# Patient Record
Sex: FEMALE | Race: WHITE | Employment: OTHER | URBAN - METROPOLITAN AREA
[De-identification: names, ages, dates, MRNs, and addresses within clinical notes are randomized per-mention and may not be internally consistent; named-entity substitution may affect disease eponyms.]

---

## 2018-03-21 ENCOUNTER — HOSPITAL ENCOUNTER (OUTPATIENT)
Age: 68
Discharge: HOME | End: 2018-03-21
Payer: MEDICARE

## 2018-03-21 DIAGNOSIS — I12.9 BENIGN HYPERTENSION WITH CKD (CHRONIC KIDNEY DISEASE) STAGE III (HCC): ICD-10-CM

## 2018-03-21 DIAGNOSIS — E55.9 VITAMIN D DEFICIENCY: ICD-10-CM

## 2018-03-21 DIAGNOSIS — N18.30 CKD (CHRONIC KIDNEY DISEASE) STAGE 3, GFR 30-59 ML/MIN (HCC): ICD-10-CM

## 2018-03-21 DIAGNOSIS — N18.30 BENIGN HYPERTENSION WITH CKD (CHRONIC KIDNEY DISEASE) STAGE III (HCC): ICD-10-CM

## 2018-03-21 LAB
ANION GAP SERPL CALCULATED.3IONS-SCNC: 13 MMOL/L (ref 9–17)
BUN BLDV-MCNC: 27 MG/DL (ref 8–23)
CHLORIDE BLD-SCNC: 101 MMOL/L (ref 98–107)
CO2: 24 MMOL/L (ref 20–31)
CREAT SERPL-MCNC: 1.28 MG/DL (ref 0.5–0.9)
GFR AFRICAN AMERICAN: 50 ML/MIN
GFR NON-AFRICAN AMERICAN: 41 ML/MIN
GFR SERPL CREATININE-BSD FRML MDRD: ABNORMAL ML/MIN/{1.73_M2}
GFR SERPL CREATININE-BSD FRML MDRD: ABNORMAL ML/MIN/{1.73_M2}
MAGNESIUM: 1.6 MG/DL (ref 1.6–2.6)
POTASSIUM SERPL-SCNC: 4.5 MMOL/L (ref 3.7–5.3)
SODIUM BLD-SCNC: 138 MMOL/L (ref 135–144)
VITAMIN D 25-HYDROXY: 26.3 NG/ML (ref 30–100)

## 2018-03-21 PROCEDURE — 84520 ASSAY OF UREA NITROGEN: CPT

## 2018-03-21 PROCEDURE — 36415 COLL VENOUS BLD VENIPUNCTURE: CPT

## 2018-03-21 PROCEDURE — 83735 ASSAY OF MAGNESIUM: CPT

## 2018-03-21 PROCEDURE — 82565 ASSAY OF CREATININE: CPT

## 2018-03-21 PROCEDURE — 82306 VITAMIN D 25 HYDROXY: CPT

## 2018-03-21 PROCEDURE — 80051 ELECTROLYTE PANEL: CPT

## 2018-08-06 ENCOUNTER — HOSPITAL ENCOUNTER (OUTPATIENT)
Age: 68
Setting detail: SPECIMEN
Discharge: HOME | End: 2018-08-06
Payer: MEDICARE

## 2018-08-06 DIAGNOSIS — E55.9 VITAMIN D DEFICIENCY: ICD-10-CM

## 2018-08-06 DIAGNOSIS — N18.30 BENIGN HYPERTENSION WITH CKD (CHRONIC KIDNEY DISEASE) STAGE III (HCC): ICD-10-CM

## 2018-08-06 DIAGNOSIS — I12.9 BENIGN HYPERTENSION WITH CKD (CHRONIC KIDNEY DISEASE) STAGE III (HCC): ICD-10-CM

## 2018-08-06 DIAGNOSIS — N18.30 CKD (CHRONIC KIDNEY DISEASE) STAGE 3, GFR 30-59 ML/MIN (HCC): ICD-10-CM

## 2018-08-06 LAB
ABSOLUTE EOS #: 0.2 K/UL (ref 0–0.44)
ABSOLUTE IMMATURE GRANULOCYTE: <0.03 K/UL (ref 0–0.3)
ABSOLUTE LYMPH #: 2.01 K/UL (ref 1.1–3.7)
ABSOLUTE MONO #: 0.59 K/UL (ref 0.1–1.2)
ANION GAP SERPL CALCULATED.3IONS-SCNC: 17 MMOL/L (ref 9–17)
BASOPHILS # BLD: 0 % (ref 0–2)
BASOPHILS ABSOLUTE: 0.03 K/UL (ref 0–0.2)
BUN BLDV-MCNC: 29 MG/DL (ref 8–23)
BUN/CREAT BLD: ABNORMAL (ref 9–20)
CALCIUM SERPL-MCNC: 9.9 MG/DL (ref 8.6–10.4)
CHLORIDE BLD-SCNC: 101 MMOL/L (ref 98–107)
CO2: 23 MMOL/L (ref 20–31)
CREAT SERPL-MCNC: 1.2 MG/DL (ref 0.5–0.9)
DIFFERENTIAL TYPE: ABNORMAL
EOSINOPHILS RELATIVE PERCENT: 3 % (ref 1–4)
GFR AFRICAN AMERICAN: 54 ML/MIN
GFR NON-AFRICAN AMERICAN: 45 ML/MIN
GFR SERPL CREATININE-BSD FRML MDRD: ABNORMAL ML/MIN/{1.73_M2}
GFR SERPL CREATININE-BSD FRML MDRD: ABNORMAL ML/MIN/{1.73_M2}
GLUCOSE BLD-MCNC: 129 MG/DL (ref 70–99)
HCT VFR BLD CALC: 35.9 % (ref 36.3–47.1)
HEMOGLOBIN: 11.9 G/DL (ref 11.9–15.1)
IMMATURE GRANULOCYTES: 0 %
LYMPHOCYTES # BLD: 27 % (ref 24–43)
MAGNESIUM: 1.8 MG/DL (ref 1.6–2.6)
MCH RBC QN AUTO: 29.9 PG (ref 25.2–33.5)
MCHC RBC AUTO-ENTMCNC: 33.1 G/DL (ref 28.4–34.8)
MCV RBC AUTO: 90.2 FL (ref 82.6–102.9)
MONOCYTES # BLD: 8 % (ref 3–12)
NRBC AUTOMATED: 0 PER 100 WBC
PDW BLD-RTO: 12.5 % (ref 11.8–14.4)
PHOSPHORUS: 5.2 MG/DL (ref 2.6–4.5)
PLATELET # BLD: ABNORMAL K/UL (ref 138–453)
PLATELET ESTIMATE: ABNORMAL
PLATELET, FLUORESCENCE: 191 K/UL (ref 138–453)
PLATELET, IMMATURE FRACTION: 4.9 % (ref 1.1–10.3)
PMV BLD AUTO: ABNORMAL FL (ref 8.1–13.5)
POTASSIUM SERPL-SCNC: 4.6 MMOL/L (ref 3.7–5.3)
RBC # BLD: 3.98 M/UL (ref 3.95–5.11)
RBC # BLD: ABNORMAL 10*6/UL
SEG NEUTROPHILS: 62 % (ref 36–65)
SEGMENTED NEUTROPHILS ABSOLUTE COUNT: 4.67 K/UL (ref 1.5–8.1)
SODIUM BLD-SCNC: 141 MMOL/L (ref 135–144)
VITAMIN D 25-HYDROXY: 39.2 NG/ML (ref 30–100)
WBC # BLD: 7.5 K/UL (ref 3.5–11.3)
WBC # BLD: ABNORMAL 10*3/UL

## 2018-08-08 ENCOUNTER — HOSPITAL ENCOUNTER (OUTPATIENT)
Age: 68
Discharge: HOME | End: 2018-08-08
Payer: MEDICARE

## 2018-08-08 DIAGNOSIS — E55.9 VITAMIN D DEFICIENCY: ICD-10-CM

## 2018-08-08 DIAGNOSIS — E83.39 HYPERPHOSPHATEMIA: ICD-10-CM

## 2018-08-08 DIAGNOSIS — N18.30 CKD (CHRONIC KIDNEY DISEASE) STAGE 3, GFR 30-59 ML/MIN (HCC): ICD-10-CM

## 2018-08-08 DIAGNOSIS — R82.998 LEUKOCYTES IN URINE: ICD-10-CM

## 2018-08-08 DIAGNOSIS — I12.9 BENIGN HYPERTENSION WITH CKD (CHRONIC KIDNEY DISEASE) STAGE III (HCC): ICD-10-CM

## 2018-08-08 DIAGNOSIS — N18.30 BENIGN HYPERTENSION WITH CKD (CHRONIC KIDNEY DISEASE) STAGE III (HCC): ICD-10-CM

## 2018-08-08 LAB
-: ABNORMAL
AMORPHOUS: ABNORMAL
BACTERIA: ABNORMAL
BILIRUBIN URINE: NEGATIVE
CASTS UA: ABNORMAL /LPF
COLOR: YELLOW
COMMENT UA: ABNORMAL
CRYSTALS, UA: ABNORMAL /HPF
EPITHELIAL CELLS UA: ABNORMAL /HPF
GLUCOSE URINE: NEGATIVE
KETONES, URINE: NEGATIVE
LEUKOCYTE ESTERASE, URINE: ABNORMAL
MUCUS: ABNORMAL
NITRITE, URINE: NEGATIVE
OTHER OBSERVATIONS UA: ABNORMAL
PH UA: 5.5 (ref 5–8)
PROTEIN UA: NEGATIVE
RBC UA: ABNORMAL /HPF
RENAL EPITHELIAL, UA: ABNORMAL /HPF
SPECIFIC GRAVITY UA: 1.02 (ref 1–1.03)
TRICHOMONAS: ABNORMAL
TURBIDITY: CLEAR
URINE HGB: NEGATIVE
UROBILINOGEN, URINE: NORMAL
WBC UA: ABNORMAL /HPF
YEAST: ABNORMAL

## 2018-08-08 PROCEDURE — 87086 URINE CULTURE/COLONY COUNT: CPT

## 2018-08-08 PROCEDURE — 81001 URINALYSIS AUTO W/SCOPE: CPT

## 2018-08-09 LAB
CULTURE: NORMAL
Lab: NORMAL
SPECIMEN DESCRIPTION: NORMAL
STATUS: NORMAL

## 2019-03-15 ENCOUNTER — HOSPITAL ENCOUNTER (OUTPATIENT)
Age: 69
Setting detail: SPECIMEN
Discharge: HOME | End: 2019-03-15
Payer: MEDICARE

## 2019-03-15 LAB
ANION GAP SERPL CALCULATED.3IONS-SCNC: 14 MMOL/L (ref 9–17)
BUN BLDV-MCNC: 38 MG/DL (ref 8–23)
BUN/CREAT BLD: ABNORMAL (ref 9–20)
CALCIUM SERPL-MCNC: 9.8 MG/DL (ref 8.6–10.4)
CHLORIDE BLD-SCNC: 104 MMOL/L (ref 98–107)
CO2: 22 MMOL/L (ref 20–31)
CREAT SERPL-MCNC: 1.09 MG/DL (ref 0.5–0.9)
GFR AFRICAN AMERICAN: >60 ML/MIN
GFR NON-AFRICAN AMERICAN: 50 ML/MIN
GFR SERPL CREATININE-BSD FRML MDRD: ABNORMAL ML/MIN/{1.73_M2}
GFR SERPL CREATININE-BSD FRML MDRD: ABNORMAL ML/MIN/{1.73_M2}
GLUCOSE BLD-MCNC: 154 MG/DL (ref 70–99)
POTASSIUM SERPL-SCNC: 4.3 MMOL/L (ref 3.7–5.3)
SODIUM BLD-SCNC: 140 MMOL/L (ref 135–144)

## 2019-06-17 ENCOUNTER — HOSPITAL ENCOUNTER (INPATIENT)
Age: 69
LOS: 4 days | Discharge: HOME | DRG: 918 | End: 2019-06-21
Attending: EMERGENCY MEDICINE | Admitting: FAMILY MEDICINE
Payer: MEDICARE

## 2019-06-17 ENCOUNTER — APPOINTMENT (OUTPATIENT)
Dept: GENERAL RADIOLOGY | Age: 69
DRG: 918 | End: 2019-06-17
Payer: MEDICARE

## 2019-06-17 DIAGNOSIS — R06.00 DYSPNEA, UNSPECIFIED TYPE: Primary | ICD-10-CM

## 2019-06-17 DIAGNOSIS — J45.40 MODERATE PERSISTENT REACTIVE AIRWAY DISEASE WITHOUT COMPLICATION: ICD-10-CM

## 2019-06-17 PROBLEM — E11.9 TYPE 2 DIABETES MELLITUS, WITHOUT LONG-TERM CURRENT USE OF INSULIN (HCC): Status: ACTIVE | Noted: 2019-06-17

## 2019-06-17 PROBLEM — J98.9 REACTIVE AIRWAY DISEASE THAT IS NOT ASTHMA: Status: ACTIVE | Noted: 2019-06-17

## 2019-06-17 LAB
% CKMB: 1.1 % (ref 0–3)
ABSOLUTE EOS #: 0.03 K/UL (ref 0–0.44)
ABSOLUTE IMMATURE GRANULOCYTE: 0.04 K/UL (ref 0–0.3)
ABSOLUTE LYMPH #: 1.91 K/UL (ref 1.1–3.7)
ABSOLUTE MONO #: 1.12 K/UL (ref 0.1–1.2)
ALBUMIN SERPL-MCNC: 4.3 G/DL (ref 3.5–5.2)
ALBUMIN/GLOBULIN RATIO: NORMAL (ref 1–2.5)
ALP BLD-CCNC: 58 U/L (ref 35–104)
ALT SERPL-CCNC: 16 U/L (ref 5–33)
ANION GAP SERPL CALCULATED.3IONS-SCNC: 16 MMOL/L (ref 9–17)
AST SERPL-CCNC: 26 U/L
BASOPHILS # BLD: 0 % (ref 0–2)
BASOPHILS ABSOLUTE: <0.03 K/UL (ref 0–0.2)
BILIRUB SERPL-MCNC: 0.32 MG/DL (ref 0.3–1.2)
BILIRUBIN DIRECT: <0.08 MG/DL
BILIRUBIN, INDIRECT: NORMAL MG/DL (ref 0–1)
BNP INTERPRETATION: ABNORMAL
BUN BLDV-MCNC: 36 MG/DL (ref 8–23)
BUN/CREAT BLD: 26 (ref 9–20)
CALCIUM SERPL-MCNC: 9.1 MG/DL (ref 8.6–10.4)
CHLORIDE BLD-SCNC: 92 MMOL/L (ref 98–107)
CK MB: 3.9 NG/ML
CKMB INTERPRETATION: ABNORMAL
CO2: 20 MMOL/L (ref 20–31)
CREAT SERPL-MCNC: 1.37 MG/DL (ref 0.5–0.9)
DIFFERENTIAL TYPE: ABNORMAL
EKG ATRIAL RATE: 69 BPM
EKG P AXIS: 83 DEGREES
EKG P-R INTERVAL: 212 MS
EKG Q-T INTERVAL: 394 MS
EKG QRS DURATION: 98 MS
EKG QTC CALCULATION (BAZETT): 422 MS
EKG R AXIS: -34 DEGREES
EKG T AXIS: 62 DEGREES
EKG VENTRICULAR RATE: 69 BPM
EOSINOPHILS RELATIVE PERCENT: 0 % (ref 1–4)
FIO2: 21
GFR AFRICAN AMERICAN: 46 ML/MIN
GFR NON-AFRICAN AMERICAN: 38 ML/MIN
GFR SERPL CREATININE-BSD FRML MDRD: ABNORMAL ML/MIN/{1.73_M2}
GFR SERPL CREATININE-BSD FRML MDRD: ABNORMAL ML/MIN/{1.73_M2}
GLOBULIN: NORMAL G/DL (ref 1.5–3.8)
GLUCOSE BLD-MCNC: 129 MG/DL (ref 65–105)
GLUCOSE BLD-MCNC: 133 MG/DL (ref 70–99)
GLUCOSE BLD-MCNC: 301 MG/DL (ref 65–105)
GLUCOSE BLD-MCNC: 486 MG/DL (ref 65–105)
GLUCOSE BLD-MCNC: 511 MG/DL (ref 65–105)
HCT VFR BLD CALC: 34.2 % (ref 36.3–47.1)
HEMOGLOBIN: 12 G/DL (ref 11.9–15.1)
IMMATURE GRANULOCYTES: 0 %
INR BLD: 0.9
LYMPHOCYTES # BLD: 17 % (ref 24–43)
MAGNESIUM: 2 MG/DL (ref 1.6–2.6)
MCH RBC QN AUTO: 30.4 PG (ref 25.2–33.5)
MCHC RBC AUTO-ENTMCNC: 35.1 G/DL (ref 28.4–34.8)
MCV RBC AUTO: 86.6 FL (ref 82.6–102.9)
MONOCYTES # BLD: 10 % (ref 3–12)
MYOGLOBIN: 262 NG/ML (ref 25–58)
NEGATIVE BASE EXCESS, ART: 5 (ref 0–2)
NRBC AUTOMATED: 0 PER 100 WBC
O2 DEVICE/FLOW/%: ABNORMAL
PARTIAL THROMBOPLASTIN TIME: 26.6 SEC (ref 23–31)
PATIENT TEMP: 37
PDW BLD-RTO: 12.2 % (ref 11.8–14.4)
PLATELET # BLD: 273 K/UL (ref 138–453)
PLATELET ESTIMATE: ABNORMAL
PMV BLD AUTO: 12.2 FL (ref 8.1–13.5)
POC HCO3: 18.8 MMOL/L (ref 22–27)
POC O2 SATURATION: 91 %
POC PCO2 TEMP: ABNORMAL MM HG
POC PCO2: 28 MM HG (ref 32–45)
POC PH TEMP: ABNORMAL
POC PH: 7.43 (ref 7.35–7.45)
POC PO2 TEMP: ABNORMAL MM HG
POC PO2: 59 MM HG (ref 75–95)
POSITIVE BASE EXCESS, ART: ABNORMAL (ref 0–2)
POTASSIUM SERPL-SCNC: 4.2 MMOL/L (ref 3.7–5.3)
PRO-BNP: 923 PG/ML
PROCALCITONIN: 0.08 NG/ML
PROTHROMBIN TIME: 9.4 SEC (ref 9.7–11.6)
RBC # BLD: 3.95 M/UL (ref 3.95–5.11)
RBC # BLD: ABNORMAL 10*6/UL
SEG NEUTROPHILS: 73 % (ref 36–65)
SEGMENTED NEUTROPHILS ABSOLUTE COUNT: 7.99 K/UL (ref 1.5–8.1)
SODIUM BLD-SCNC: 128 MMOL/L (ref 135–144)
TCO2 (CALC), ART: 20 MMOL/L (ref 23–28)
TOTAL CK: 356 U/L (ref 26–192)
TOTAL PROTEIN: 7.4 G/DL (ref 6.4–8.3)
TROPONIN INTERP: ABNORMAL
TROPONIN T: ABNORMAL NG/ML
TROPONIN, HIGH SENSITIVITY: 18 NG/L (ref 0–14)
WBC # BLD: 11.4 K/UL (ref 3.5–11.3)
WBC # BLD: ABNORMAL 10*3/UL

## 2019-06-17 PROCEDURE — 97116 GAIT TRAINING THERAPY: CPT

## 2019-06-17 PROCEDURE — 36600 WITHDRAWAL OF ARTERIAL BLOOD: CPT

## 2019-06-17 PROCEDURE — 82803 BLOOD GASES ANY COMBINATION: CPT

## 2019-06-17 PROCEDURE — 83880 ASSAY OF NATRIURETIC PEPTIDE: CPT

## 2019-06-17 PROCEDURE — 97161 PT EVAL LOW COMPLEX 20 MIN: CPT

## 2019-06-17 PROCEDURE — 2580000003 HC RX 258: Performed by: FAMILY MEDICINE

## 2019-06-17 PROCEDURE — 83735 ASSAY OF MAGNESIUM: CPT

## 2019-06-17 PROCEDURE — 94760 N-INVAS EAR/PLS OXIMETRY 1: CPT

## 2019-06-17 PROCEDURE — 94640 AIRWAY INHALATION TREATMENT: CPT

## 2019-06-17 PROCEDURE — 80076 HEPATIC FUNCTION PANEL: CPT

## 2019-06-17 PROCEDURE — 82805 BLOOD GASES W/O2 SATURATION: CPT

## 2019-06-17 PROCEDURE — 85610 PROTHROMBIN TIME: CPT

## 2019-06-17 PROCEDURE — 93010 ELECTROCARDIOGRAM REPORT: CPT | Performed by: INTERNAL MEDICINE

## 2019-06-17 PROCEDURE — 82947 ASSAY GLUCOSE BLOOD QUANT: CPT

## 2019-06-17 PROCEDURE — 94010 BREATHING CAPACITY TEST: CPT

## 2019-06-17 PROCEDURE — 85025 COMPLETE CBC W/AUTO DIFF WBC: CPT

## 2019-06-17 PROCEDURE — 84145 PROCALCITONIN (PCT): CPT

## 2019-06-17 PROCEDURE — 93005 ELECTROCARDIOGRAM TRACING: CPT | Performed by: EMERGENCY MEDICINE

## 2019-06-17 PROCEDURE — 6360000002 HC RX W HCPCS: Performed by: FAMILY MEDICINE

## 2019-06-17 PROCEDURE — 6360000002 HC RX W HCPCS: Performed by: EMERGENCY MEDICINE

## 2019-06-17 PROCEDURE — 82553 CREATINE MB FRACTION: CPT

## 2019-06-17 PROCEDURE — 83874 ASSAY OF MYOGLOBIN: CPT

## 2019-06-17 PROCEDURE — 6370000000 HC RX 637 (ALT 250 FOR IP): Performed by: EMERGENCY MEDICINE

## 2019-06-17 PROCEDURE — 99285 EMERGENCY DEPT VISIT HI MDM: CPT

## 2019-06-17 PROCEDURE — 82550 ASSAY OF CK (CPK): CPT

## 2019-06-17 PROCEDURE — 84484 ASSAY OF TROPONIN QUANT: CPT

## 2019-06-17 PROCEDURE — 1200000000 HC SEMI PRIVATE

## 2019-06-17 PROCEDURE — 85730 THROMBOPLASTIN TIME PARTIAL: CPT

## 2019-06-17 PROCEDURE — 71046 X-RAY EXAM CHEST 2 VIEWS: CPT

## 2019-06-17 PROCEDURE — 80048 BASIC METABOLIC PNL TOTAL CA: CPT

## 2019-06-17 PROCEDURE — 6370000000 HC RX 637 (ALT 250 FOR IP): Performed by: FAMILY MEDICINE

## 2019-06-17 RX ORDER — PRAVASTATIN SODIUM 10 MG
10 TABLET ORAL DAILY
Status: ON HOLD | COMMUNITY
End: 2019-06-21 | Stop reason: HOSPADM

## 2019-06-17 RX ORDER — IPRATROPIUM BROMIDE AND ALBUTEROL SULFATE 2.5; .5 MG/3ML; MG/3ML
1 SOLUTION RESPIRATORY (INHALATION)
Status: COMPLETED | OUTPATIENT
Start: 2019-06-17 | End: 2019-06-18

## 2019-06-17 RX ORDER — ZINC OXIDE 13 %
2 CREAM (GRAM) TOPICAL DAILY
Status: DISCONTINUED | OUTPATIENT
Start: 2019-06-17 | End: 2019-06-17 | Stop reason: CLARIF

## 2019-06-17 RX ORDER — ASPIRIN 81 MG/1
81 TABLET ORAL DAILY
Status: DISCONTINUED | OUTPATIENT
Start: 2019-06-17 | End: 2019-06-21 | Stop reason: HOSPADM

## 2019-06-17 RX ORDER — SULFACETAMIDE SODIUM 100 MG/ML
1 SOLUTION/ DROPS OPHTHALMIC 4 TIMES DAILY
Status: COMPLETED | OUTPATIENT
Start: 2019-06-17 | End: 2019-06-21

## 2019-06-17 RX ORDER — M-VIT,TX,IRON,MINS/CALC/FOLIC 27MG-0.4MG
2 TABLET ORAL DAILY
Status: ON HOLD | COMMUNITY
End: 2019-06-17

## 2019-06-17 RX ORDER — PRAVASTATIN SODIUM 20 MG
10 TABLET ORAL NIGHTLY
Status: DISCONTINUED | OUTPATIENT
Start: 2019-06-17 | End: 2019-06-21 | Stop reason: HOSPADM

## 2019-06-17 RX ORDER — PRAVASTATIN SODIUM 20 MG
20 TABLET ORAL NIGHTLY
Status: DISCONTINUED | OUTPATIENT
Start: 2019-06-17 | End: 2019-06-17

## 2019-06-17 RX ORDER — CALCIUM CARBONATE 500(1250)
500 TABLET ORAL DAILY
Status: DISCONTINUED | OUTPATIENT
Start: 2019-06-17 | End: 2019-06-17 | Stop reason: CLARIF

## 2019-06-17 RX ORDER — METHYLPREDNISOLONE SODIUM SUCCINATE 125 MG/2ML
60 INJECTION, POWDER, LYOPHILIZED, FOR SOLUTION INTRAMUSCULAR; INTRAVENOUS EVERY 6 HOURS
Status: DISCONTINUED | OUTPATIENT
Start: 2019-06-17 | End: 2019-06-17

## 2019-06-17 RX ORDER — NICOTINE POLACRILEX 4 MG
15 LOZENGE BUCCAL PRN
Status: DISCONTINUED | OUTPATIENT
Start: 2019-06-17 | End: 2019-06-21 | Stop reason: HOSPADM

## 2019-06-17 RX ORDER — SODIUM CHLORIDE 0.9 % (FLUSH) 0.9 %
10 SYRINGE (ML) INJECTION EVERY 12 HOURS SCHEDULED
Status: DISCONTINUED | OUTPATIENT
Start: 2019-06-17 | End: 2019-06-21 | Stop reason: HOSPADM

## 2019-06-17 RX ORDER — ALBUTEROL SULFATE 90 UG/1
2 AEROSOL, METERED RESPIRATORY (INHALATION)
Status: DISCONTINUED | OUTPATIENT
Start: 2019-06-17 | End: 2019-06-17

## 2019-06-17 RX ORDER — BENZONATATE 100 MG/1
100 CAPSULE ORAL 3 TIMES DAILY PRN
COMMUNITY
End: 2020-01-24

## 2019-06-17 RX ORDER — AMLODIPINE BESYLATE 10 MG/1
10 TABLET ORAL DAILY
Status: ON HOLD | COMMUNITY
End: 2019-06-21 | Stop reason: HOSPADM

## 2019-06-17 RX ORDER — CALCIUM CARBONATE 200(500)MG
500 TABLET,CHEWABLE ORAL DAILY
Status: DISCONTINUED | OUTPATIENT
Start: 2019-06-17 | End: 2019-06-21 | Stop reason: HOSPADM

## 2019-06-17 RX ORDER — PREDNISONE 20 MG/1
40 TABLET ORAL DAILY
Status: ON HOLD | COMMUNITY
End: 2019-06-21 | Stop reason: HOSPADM

## 2019-06-17 RX ORDER — DEXTROSE MONOHYDRATE 50 MG/ML
100 INJECTION, SOLUTION INTRAVENOUS PRN
Status: DISCONTINUED | OUTPATIENT
Start: 2019-06-17 | End: 2019-06-21 | Stop reason: HOSPADM

## 2019-06-17 RX ORDER — HYDROCHLOROTHIAZIDE 25 MG/1
25 TABLET ORAL DAILY
Status: DISCONTINUED | OUTPATIENT
Start: 2019-06-17 | End: 2019-06-21 | Stop reason: HOSPADM

## 2019-06-17 RX ORDER — GLIMEPIRIDE 2 MG/1
4 TABLET ORAL 2 TIMES DAILY WITH MEALS
Status: DISCONTINUED | OUTPATIENT
Start: 2019-06-17 | End: 2019-06-21 | Stop reason: HOSPADM

## 2019-06-17 RX ORDER — MAGNESIUM GLUCONATE 27 MG(500)
500 TABLET ORAL DAILY
Status: DISCONTINUED | OUTPATIENT
Start: 2019-06-17 | End: 2019-06-17

## 2019-06-17 RX ORDER — IPRATROPIUM BROMIDE AND ALBUTEROL SULFATE 2.5; .5 MG/3ML; MG/3ML
1 SOLUTION RESPIRATORY (INHALATION)
Status: DISCONTINUED | OUTPATIENT
Start: 2019-06-17 | End: 2019-06-17

## 2019-06-17 RX ORDER — METHYLPREDNISOLONE SODIUM SUCCINATE 125 MG/2ML
125 INJECTION, POWDER, LYOPHILIZED, FOR SOLUTION INTRAMUSCULAR; INTRAVENOUS ONCE
Status: COMPLETED | OUTPATIENT
Start: 2019-06-17 | End: 2019-06-17

## 2019-06-17 RX ORDER — METFORMIN HYDROCHLORIDE 500 MG/1
1000 TABLET, EXTENDED RELEASE ORAL
Status: DISCONTINUED | OUTPATIENT
Start: 2019-06-18 | End: 2019-06-19

## 2019-06-17 RX ORDER — INSULIN GLARGINE 100 [IU]/ML
39 INJECTION, SOLUTION SUBCUTANEOUS NIGHTLY
Status: DISCONTINUED | OUTPATIENT
Start: 2019-06-17 | End: 2019-06-21 | Stop reason: HOSPADM

## 2019-06-17 RX ORDER — ALBUTEROL SULFATE 2.5 MG/3ML
5 SOLUTION RESPIRATORY (INHALATION)
Status: DISCONTINUED | OUTPATIENT
Start: 2019-06-17 | End: 2019-06-17

## 2019-06-17 RX ORDER — LOSARTAN POTASSIUM 100 MG/1
100 TABLET ORAL DAILY
Status: DISCONTINUED | OUTPATIENT
Start: 2019-06-17 | End: 2019-06-21 | Stop reason: HOSPADM

## 2019-06-17 RX ORDER — DEXTROSE MONOHYDRATE 25 G/50ML
12.5 INJECTION, SOLUTION INTRAVENOUS PRN
Status: DISCONTINUED | OUTPATIENT
Start: 2019-06-17 | End: 2019-06-21 | Stop reason: HOSPADM

## 2019-06-17 RX ORDER — SODIUM CHLORIDE 0.9 % (FLUSH) 0.9 %
10 SYRINGE (ML) INJECTION PRN
Status: DISCONTINUED | OUTPATIENT
Start: 2019-06-17 | End: 2019-06-21 | Stop reason: HOSPADM

## 2019-06-17 RX ORDER — METFORMIN HYDROCHLORIDE 500 MG/1
1000 TABLET, EXTENDED RELEASE ORAL
Status: ON HOLD | COMMUNITY
End: 2019-06-21 | Stop reason: HOSPADM

## 2019-06-17 RX ORDER — CALCIUM CARBONATE 500(1250)
1200 TABLET ORAL DAILY
Status: ON HOLD | COMMUNITY
End: 2021-07-10 | Stop reason: HOSPADM

## 2019-06-17 RX ORDER — LANOLIN ALCOHOL/MO/W.PET/CERES
1000 CREAM (GRAM) TOPICAL DAILY
COMMUNITY
End: 2019-08-19

## 2019-06-17 RX ORDER — METHYLPREDNISOLONE SODIUM SUCCINATE 125 MG/2ML
60 INJECTION, POWDER, LYOPHILIZED, FOR SOLUTION INTRAMUSCULAR; INTRAVENOUS EVERY 8 HOURS
Status: DISCONTINUED | OUTPATIENT
Start: 2019-06-18 | End: 2019-06-20

## 2019-06-17 RX ADMIN — PRAVASTATIN SODIUM 10 MG: 20 TABLET ORAL at 21:41

## 2019-06-17 RX ADMIN — METOPROLOL TARTRATE 37.5 MG: 25 TABLET ORAL at 21:40

## 2019-06-17 RX ADMIN — METHYLPREDNISOLONE SODIUM SUCCINATE 125 MG: 125 INJECTION, POWDER, FOR SOLUTION INTRAMUSCULAR; INTRAVENOUS at 07:51

## 2019-06-17 RX ADMIN — MAGNESIUM OXIDE TAB 400 MG (241.3 MG ELEMENTAL MG) 400 MG: 400 (241.3 MG) TAB at 17:47

## 2019-06-17 RX ADMIN — IPRATROPIUM BROMIDE AND ALBUTEROL SULFATE 1 AMPULE: .5; 3 SOLUTION RESPIRATORY (INHALATION) at 14:31

## 2019-06-17 RX ADMIN — PROBIOTIC PRODUCT - TAB 2 TABLET: TAB at 17:47

## 2019-06-17 RX ADMIN — METHYLPREDNISOLONE SODIUM SUCCINATE 60 MG: 125 INJECTION, POWDER, FOR SOLUTION INTRAMUSCULAR; INTRAVENOUS at 12:23

## 2019-06-17 RX ADMIN — METHYLPREDNISOLONE SODIUM SUCCINATE 60 MG: 125 INJECTION, POWDER, FOR SOLUTION INTRAMUSCULAR; INTRAVENOUS at 21:41

## 2019-06-17 RX ADMIN — AZITHROMYCIN MONOHYDRATE 500 MG: 500 INJECTION, POWDER, LYOPHILIZED, FOR SOLUTION INTRAVENOUS at 11:58

## 2019-06-17 RX ADMIN — Medication 10 ML: at 21:50

## 2019-06-17 RX ADMIN — ALBUTEROL SULFATE 5 MG: 5 SOLUTION RESPIRATORY (INHALATION) at 06:37

## 2019-06-17 RX ADMIN — SULFACETAMIDE SODIUM 1 DROP: 100 SOLUTION/ DROPS OPHTHALMIC at 13:54

## 2019-06-17 RX ADMIN — SULFACETAMIDE SODIUM 1 DROP: 100 SOLUTION/ DROPS OPHTHALMIC at 17:45

## 2019-06-17 RX ADMIN — ANTACID TABLETS 500 MG: 500 TABLET, CHEWABLE ORAL at 17:47

## 2019-06-17 RX ADMIN — ASPIRIN 81 MG: 81 TABLET, COATED ORAL at 17:47

## 2019-06-17 RX ADMIN — IPRATROPIUM BROMIDE AND ALBUTEROL SULFATE 1 AMPULE: .5; 3 SOLUTION RESPIRATORY (INHALATION) at 19:49

## 2019-06-17 RX ADMIN — SULFACETAMIDE SODIUM 1 DROP: 100 SOLUTION/ DROPS OPHTHALMIC at 21:49

## 2019-06-17 RX ADMIN — HYDROCHLOROTHIAZIDE 25 MG: 25 TABLET ORAL at 17:47

## 2019-06-17 RX ADMIN — INSULIN GLARGINE 39 UNITS: 100 INJECTION, SOLUTION SUBCUTANEOUS at 21:39

## 2019-06-17 RX ADMIN — CHOLECALCIFEROL CAP 125 MCG (5000 UNIT) 5000 UNITS: 125 CAP at 17:48

## 2019-06-17 RX ADMIN — IPRATROPIUM BROMIDE AND ALBUTEROL SULFATE 1 AMPULE: .5; 3 SOLUTION RESPIRATORY (INHALATION) at 11:20

## 2019-06-17 RX ADMIN — INSULIN LISPRO 12 UNITS: 100 INJECTION, SOLUTION INTRAVENOUS; SUBCUTANEOUS at 16:23

## 2019-06-17 RX ADMIN — Medication 10 ML: at 11:58

## 2019-06-17 RX ADMIN — LOSARTAN POTASSIUM 100 MG: 100 TABLET, FILM COATED ORAL at 17:47

## 2019-06-17 RX ADMIN — GLIMEPIRIDE 4 MG: 2 TABLET ORAL at 17:47

## 2019-06-17 RX ADMIN — INSULIN LISPRO 6 UNITS: 100 INJECTION, SOLUTION INTRAVENOUS; SUBCUTANEOUS at 21:38

## 2019-06-17 ASSESSMENT — ENCOUNTER SYMPTOMS
SHORTNESS OF BREATH: 1
EYES NEGATIVE: 1
COUGH: 1
ALLERGIC/IMMUNOLOGIC NEGATIVE: 1
GASTROINTESTINAL NEGATIVE: 1
WHEEZING: 1

## 2019-06-17 ASSESSMENT — PAIN SCALES - GENERAL: PAINLEVEL_OUTOF10: 0

## 2019-06-17 NOTE — ED NOTES
Telephone report given to [facility], [address] [place] [address] Dr. Alex, [facility]  06/17/19 5737

## 2019-06-17 NOTE — PROGRESS NOTES
Occupational Therapy  DATE: 2019    NAME: Mai Sanchez  MRN: [id number]   : 1950    Patient not seen this date for Occupational Therapy due to:  [] Blood transfusion in progress  [] Cancel by RN  [] Hemodialysis  []  Refusal by Patient   [] Spine Precautions   [] Strict Bedrest  [] Surgery  [] Testing      [x] Other- pt is I/no needs and requested OT order DC         [] PT being discontinued at this time. Patient independent. No further needs. [] PT being discontinued at this time as the patient has been transferred to hospice care. No further needs.     Governor Chao OT

## 2019-06-17 NOTE — ED NOTES
Pt presents with c/o SOB. Pt states her symptoms began on Wednesday. States she was prescribed Tessalon Perles and Prednisone on Friday, but has not been experiencing relief. History of asthma. Wheezes noted throughout. Respirations even and non labored. Denies pain.       Jose Monaco RN  06/17/19 6678

## 2019-06-17 NOTE — PROGRESS NOTES
Nutrition Assessment    Type and Reason for Visit: Initial, Consult(Evaluation & Tx)    Nutrition Recommendations: 1. Suggest continuing on general diet. 2. Monitor need for a 1,800 kcal carb controlled restriction & Glucerna Shake. Nutrition Assessment: Per Pt:  +SOB (better), +sinus drainage, +coughing, +decreased taste sensation, and +poor appetite. Slight +wt loss of 1.6 kg (1.6%) since 1/7 (100 kg). Evaluated Pt to be @ risk for malnutrition, with dx of dyspnea & moderate persistent reactive airway ds. Suggest continuing on general diet per Pt request.  Monitor need for a 1,800 kcal carb controlled restriction, and ONS. Malnutrition Assessment:  · Malnutrition Status: At risk for malnutrition  · Findings of the 6 clinical characteristics of malnutrition (Minimum of 2 out of 6 clinical characteristics is required to make the diagnosis of moderate or severe Protein Calorie Malnutrition based on AND/ASPEN Guidelines):  1. Energy Intake-Unable to assess, Unable to assess    2. Weight Loss-1-2% loss, in 6 months  3. Fat Loss-No significant subcutaneous fat loss  4. Muscle Loss-No significant muscle mass loss  5. Fluid Accumulation-No significant fluid accumulation  6.  Strength-Not measured    Nutrition Risk Level: Moderate    Nutrient Needs:  · Estimated Daily Total Kcal: 1,750-1,900 kcal (18-19 kcal/kg)  · Estimated Daily Protein (g): 55-65 g (1-1.2 g/kg)  · Estimated Daily Total Fluid (ml/day): 2,000-2,100 (20-21 ml/kg)    Nutrition Diagnosis:   · Problem: Inadequate oral intake  · Etiology: related to Impaired respiratory function-inability to consume food     Signs and symptoms:  as evidenced by Patient report of:  +SOB  (6/17) XR Chest:  results.     Objective Information:  · Nutrition-Focused Physical Findings: GI:  rounded, soft, nontender, active bowel sounds; PV & Skin:  WDL  · Wound Type: None  · Current Nutrition Therapies:  · Oral Diet Orders: General   · Oral Diet intake:

## 2019-06-17 NOTE — H&P
monitor patient's labs. 3.  We will continue monitoring her cardiac wise  4. We will go ahead and get respiratory pathogen panel as well as procalcitonin level.   Current orders:  Orders Placed This Encounter   Procedures    Respiratory Virus PCR Panel     Standing Status:   Standing     Number of Occurrences:   1    XR CHEST STANDARD (2 VW)     Standing Status:   Standing     Number of Occurrences:   1     Order Specific Question:   Reason for exam:     Answer:   sob    APTT     Standing Status:   Standing     Number of Occurrences:   1    Liver Profile     Standing Status:   Standing     Number of Occurrences:   1    Magnesium     Standing Status:   Standing     Number of Occurrences:   1    PT     Standing Status:   Standing     Number of Occurrences:   1    Basic Metabolic Prof     Standing Status:   Standing     Number of Occurrences:   1    Brain Natriuretic Peptide     Standing Status:   Standing     Number of Occurrences:   1    CBC with DIFF     Standing Status:   Standing     Number of Occurrences:   1    Creat Kinase-MB Iso     Standing Status:   Standing     Number of Occurrences:   1    Trop/Myoglobin     Standing Status:   Standing     Number of Occurrences:   1    Procalcitonin     Standing Status:   Standing     Number of Occurrences:   1    DIET GENERAL;     Standing Status:   Standing     Number of Occurrences:   1    Telemetry monitoring     Standing Status:   Standing     Number of Occurrences:   1     Order Specific Question:   Patient may go off unit without monitor     Answer:   No    Vital signs per unit routine     Standing Status:   Standing     Number of Occurrences:   1    Notify physician     Notify physician for pulse less than 50 or greater than 120, respiratory rate less than 12 or greater than 25, oral temperature greater than 101.3 F (38.5 C) , urinary output less than 120 mL in four hours, systolic BP less than 90 or greater than 947, diastolic BP less than 50 or greater than 100. Standing Status:   Standing     Number of Occurrences:   1    Notify physician     Upon patient's arrival to the unit, please contact the admitting provider for completion of medication reconciliation and comprehensive orders. Standing Status:   Standing     Number of Occurrences:   1    Up as tolerated     Standing Status:   Standing     Number of Occurrences:   12840    Place intermittent pneumatic compression device     When appropriate prophylactic therapy cannot be provided due to patient limitations, serial screening Duplex Doppler Ultrasound studies of the legs may be considered to assess for development of DVT. Standing Status:   Standing     Number of Occurrences:   1    Telemetry monitoring     Standing Status:   Standing     Number of Occurrences:   1     Order Specific Question:   Patient may go off unit without monitor     Answer:   No    Full Code     Standing Status:   Standing     Number of Occurrences:   1    Inpatient consult to Hospitalist     Standing Status:   Standing     Number of Occurrences:   1     Order Specific Question:   Reason for Consult? Answer:   admit    Initiate ED Aerosol Protocol     Level 4  Dyspnea at rest, RR greater than 35. Poor aeration/diffuse wheezing and unable to do peak flow or PEFR below 50% predicted/personal best; unable to do FEV1/FVC or less than 50%. \" Albuterol aerosol 10mg STAT (inhalation)  \" Ipratropium aerosol 0.5mg (inhalation) if patient has COPD    Level 3   Speaking in partial sentences, RR greater than 25. Moderate I/E wheezing with   Adequate aeration and unable to do peak flow or PEFR below 51 to 70 % of predicted/personal best; unable to do FEV1/FVC or is 50 to 59%  \" Albuterol aerosol 5mg (inhalation)  \" Ipratropium aerosol 0.5mg (inhalation) if patient has COPD    Level 2   SOB with minimal activity. RR between 20 and 25. Mild wheezing with good   aeration.  and unable to do peak flow or PEFR 70% or

## 2019-06-17 NOTE — FLOWSHEET NOTE
Patient is sleeping as writer entered the room. Writer gave a silent prayer of healing and comfort during her stay. Spiritual care will follow up as needed or requested.       06/17/19 7818   Encounter Summary   Services provided to: Patient   Referral/Consult From: Alicia   Continue Visiting   (6/17/2019)   Complexity of Encounter Low   Length of Encounter 15 minutes   Routine   Type Initial   Assessment Sleeping   Intervention Prayer

## 2019-06-17 NOTE — ED PROVIDER NOTES
[address] [place] [address] ED  eMERGENCY dEPARTMENT eNCOUnter      Pt Name: Liliya Puente  MRN: [id number]  [place] 1950  Date of evaluation: 6/17/2019  Provider: Munira Garcia MD    CHIEF COMPLAINT     Chief Complaint   Patient presents with    Shortness of Breath         HISTORY OF PRESENT ILLNESS   (Location/Symptom, Timing/Onset, Context/Setting,Quality, Duration, Modifying Factors, Severity)  Note limiting factors. Liliya Puente is a 78 y.o. female who presents to the emergency department with chief complaint of shortness of breath. Patient states she sprayed some deer and rabbit repellent in her yard 1 week ago and the following day started noticing some congestion in her chest.  Following that she has had increased shortness of breath and has been coughing up a lot of phlegm. She denies fever or chills. She presented to an urgent care center 2 days ago and was started on prednisone and Tessalon. The history is provided by the patient and medical records. Nursing Notes werereviewed. REVIEW OF SYSTEMS    (2-9 systems for level 4, 10 or more for level 5)     Review of Systems   All other systems reviewed and are negative. Except as noted above the remainder of the review of systems was reviewed and negative.        PAST MEDICAL HISTORY     Past Medical History:   Diagnosis Date    Anemia     Arrhythmia     Arthritis     Asthma     CKD (chronic kidney disease) stage 1, GFR 90 ml/min or greater     DM type 2 [facility]diabetes mellitus, type 2) [facility] [address]     HTN (hypertension)     Hypomagnesemia          SURGICALHISTORY       Past Surgical History:   Procedure Laterality Date    BUNIONECTOMY      CARPAL TUNNEL RELEASE Left 03/2018    CARPAL TUNNEL RELEASE Right 10/2018    CHOLECYSTECTOMY      FINGER TRIGGER RELEASE Right     ring finger    HERNIA REPAIR      HYSTERECTOMY      TONSILLECTOMY AND ADENOIDECTOMY           CURRENT MEDICATIONS       Previous Medications    ASPIRIN 81 MG TABLET    Take 81 First-degree AV block. Left axis deviation. No acute ST elevation. PACs. RADIOLOGY:   Non-plain film images such as CT, Ultrasound and MRI are read by the radiologist. Plain radiographic images are visualized and preliminarily interpreted by the emergency physician with the below findings:    Interpretation per the Radiologist below, ifavailable at the time of this note:    XR CHEST STANDARD (2 VW)    (Results Pending)         ED BEDSIDE ULTRASOUND:   Performed by ED Physician - none    LABS:  Labs Reviewed   PROTIME-INR - Abnormal; Notable for the following components:       Result Value    Protime 9.4 (*)     All other components within normal limits   CBC WITH AUTO DIFFERENTIAL - Abnormal; Notable for the following components:    WBC 11.4 (*)     Hematocrit 34.2 (*)     MCHC 35.1 (*)     Seg Neutrophils 73 (*)     Lymphocytes 17 (*)     Eosinophils % 0 (*)     All other components within normal limits   APTT   HEPATIC FUNCTION PANEL   MAGNESIUM   BASIC METABOLIC PANEL   BRAIN NATRIURETIC PEPTIDE   CK ISOENZYMES   TROP/MYOGLOBIN       All other labs were within normal range ornot returned as of this dictation. EMERGENCY DEPARTMENT COURSE and DIFFERENTIAL DIAGNOSIS/MDM:   Vitals:    Vitals:    06/17/19 0542 06/17/19 0543   BP:  (!) 144/50   Pulse: 69    Resp: 16    Temp: 98.6 °F (37 °C)    TempSrc: Oral    SpO2: 95%    Weight: 217 lb (98.4 kg)    Height: 5' 1\" (1.549 m)        Work-up was initiated and patient was started on inhaled bronchodilators. Care is transferred to Dr. Erica Mackay at change of shift for further management. MDM    CONSULTS:  None    PROCEDURES:  Unlessotherwise noted below, none     Procedures    FINAL IMPRESSION      1. Dyspnea, unspecified type          DISPOSITION/PLAN   DISPOSITION        PATIENT REFERRED TO:  No follow-up provider specified.     DISCHARGE MEDICATIONS:         Problem List:  Patient Active Problem List   Diagnosis Code    HTN (hypertension) I10    Hypomagnesemia E80.43    CKD (chronic kidney disease) stage 3, GFR 30-59 ml/min ([facility]) N18.3    Vitamin D deficiency E55.9           Summation        ED Medicationsadministered this visit:    Medications   albuterol (PROVENTIL) nebulizer solution 5 mg (5 mg Nebulization Given 6/17/19 0637)   ipratropium-albuterol (DUONEB) nebulizer solution 1 ampule (has no administration in time range)   albuterol (PROVENTIL) nebulizer solution 5 mg (has no administration in time range)   albuterol sulfate  (90 Base) MCG/ACT inhaler 2 puff (has no administration in time range)   albuterol sulfate  (90 Base) MCG/ACT inhaler 2 puff (has no administration in time range)     And   ipratropium (ATROVENT HFA) 17 MCG/ACT inhaler 2 puff (has no administration in time range)   ipratropium (ATROVENT) 0.02 % nebulizer solution 0.5 mg (has no administration in time range)       New Prescriptions from this visit:    New Prescriptions    No medications on file       Follow-up:  No follow-up provider specified. Final Impression:   1.  Dyspnea, unspecified type               (Please note that portions of this note were completed with a voice recognitionprogram.  Efforts were made to edit the dictations but occasionally words are mis-transcribed.)    Jamar Singh MD (electronically signed)  Attending Emergency Physician            Jamar Singh MD  06/17/19 0836

## 2019-06-18 ENCOUNTER — APPOINTMENT (OUTPATIENT)
Dept: GENERAL RADIOLOGY | Age: 69
DRG: 918 | End: 2019-06-18
Payer: MEDICARE

## 2019-06-18 LAB
ABSOLUTE EOS #: <0.03 K/UL (ref 0–0.44)
ABSOLUTE IMMATURE GRANULOCYTE: 0.25 K/UL (ref 0–0.3)
ABSOLUTE LYMPH #: 0.9 K/UL (ref 1.1–3.7)
ABSOLUTE MONO #: 0.41 K/UL (ref 0.1–1.2)
ADENOVIRUS PCR: NOT DETECTED
ANION GAP SERPL CALCULATED.3IONS-SCNC: 14 MMOL/L (ref 9–17)
BASOPHILS # BLD: 0 % (ref 0–2)
BASOPHILS ABSOLUTE: 0.03 K/UL (ref 0–0.2)
BNP INTERPRETATION: ABNORMAL
BORDETELLA PERTUSSIS PCR: NOT DETECTED
BUN BLDV-MCNC: 44 MG/DL (ref 8–23)
BUN/CREAT BLD: 31 (ref 9–20)
CALCIUM SERPL-MCNC: 8.8 MG/DL (ref 8.6–10.4)
CHLAMYDIA PNEUMONIAE BY PCR: NOT DETECTED
CHLORIDE BLD-SCNC: 96 MMOL/L (ref 98–107)
CO2: 18 MMOL/L (ref 20–31)
CORONAVIRUS 229E PCR: NOT DETECTED
CORONAVIRUS HKU1 PCR: NOT DETECTED
CORONAVIRUS NL63 PCR: NOT DETECTED
CORONAVIRUS OC43 PCR: NOT DETECTED
CREAT SERPL-MCNC: 1.44 MG/DL (ref 0.5–0.9)
DIFFERENTIAL TYPE: ABNORMAL
EOSINOPHILS RELATIVE PERCENT: 0 % (ref 1–4)
GFR AFRICAN AMERICAN: 44 ML/MIN
GFR NON-AFRICAN AMERICAN: 36 ML/MIN
GFR SERPL CREATININE-BSD FRML MDRD: ABNORMAL ML/MIN/{1.73_M2}
GFR SERPL CREATININE-BSD FRML MDRD: ABNORMAL ML/MIN/{1.73_M2}
GLUCOSE BLD-MCNC: 311 MG/DL (ref 65–105)
GLUCOSE BLD-MCNC: 379 MG/DL (ref 65–105)
GLUCOSE BLD-MCNC: 391 MG/DL (ref 65–105)
GLUCOSE BLD-MCNC: 397 MG/DL (ref 65–105)
GLUCOSE BLD-MCNC: 408 MG/DL (ref 70–99)
GLUCOSE BLD-MCNC: 494 MG/DL (ref 65–105)
GLUCOSE BLD-MCNC: 571 MG/DL (ref 65–105)
GLUCOSE BLD-MCNC: 582 MG/DL (ref 65–105)
GLUCOSE BLD-MCNC: >600 MG/DL (ref 65–105)
HCT VFR BLD CALC: 33.2 % (ref 36.3–47.1)
HEMOGLOBIN: 11.3 G/DL (ref 11.9–15.1)
HUMAN METAPNEUMOVIRUS PCR: NOT DETECTED
IMMATURE GRANULOCYTES: 2 %
INFLUENZA A BY PCR: NOT DETECTED
INFLUENZA A H1 (2009) PCR: ABNORMAL
INFLUENZA A H1 PCR: ABNORMAL
INFLUENZA A H3 PCR: ABNORMAL
INFLUENZA B BY PCR: NOT DETECTED
LYMPHOCYTES # BLD: 7 % (ref 24–43)
MAGNESIUM: 2.4 MG/DL (ref 1.6–2.6)
MCH RBC QN AUTO: 29.9 PG (ref 25.2–33.5)
MCHC RBC AUTO-ENTMCNC: 34 G/DL (ref 28.4–34.8)
MCV RBC AUTO: 87.8 FL (ref 82.6–102.9)
MONOCYTES # BLD: 3 % (ref 3–12)
MYCOPLASMA PNEUMONIAE PCR: NOT DETECTED
NRBC AUTOMATED: 0 PER 100 WBC
PARAINFLUENZA 1 PCR: NOT DETECTED
PARAINFLUENZA 2 PCR: NOT DETECTED
PARAINFLUENZA 3 PCR: NOT DETECTED
PARAINFLUENZA 4 PCR: NOT DETECTED
PDW BLD-RTO: 12.1 % (ref 11.8–14.4)
PLATELET # BLD: 234 K/UL (ref 138–453)
PLATELET ESTIMATE: ABNORMAL
PMV BLD AUTO: 11.6 FL (ref 8.1–13.5)
POTASSIUM SERPL-SCNC: 4.4 MMOL/L (ref 3.7–5.3)
PRO-BNP: 1954 PG/ML
RBC # BLD: 3.78 M/UL (ref 3.95–5.11)
RBC # BLD: ABNORMAL 10*6/UL
RESP SYNCYTIAL VIRUS PCR: NOT DETECTED
RHINO/ENTEROVIRUS PCR: DETECTED
SEG NEUTROPHILS: 87 % (ref 36–65)
SEGMENTED NEUTROPHILS ABSOLUTE COUNT: 10.79 K/UL (ref 1.5–8.1)
SODIUM BLD-SCNC: 128 MMOL/L (ref 135–144)
SPECIMEN DESCRIPTION: ABNORMAL
WBC # BLD: 12.4 K/UL (ref 3.5–11.3)
WBC # BLD: ABNORMAL 10*3/UL

## 2019-06-18 PROCEDURE — 71046 X-RAY EXAM CHEST 2 VIEWS: CPT

## 2019-06-18 PROCEDURE — 87633 RESP VIRUS 12-25 TARGETS: CPT

## 2019-06-18 PROCEDURE — 1200000000 HC SEMI PRIVATE

## 2019-06-18 PROCEDURE — 94640 AIRWAY INHALATION TREATMENT: CPT

## 2019-06-18 PROCEDURE — 6370000000 HC RX 637 (ALT 250 FOR IP): Performed by: FAMILY MEDICINE

## 2019-06-18 PROCEDURE — 6360000002 HC RX W HCPCS: Performed by: FAMILY MEDICINE

## 2019-06-18 PROCEDURE — 85025 COMPLETE CBC W/AUTO DIFF WBC: CPT

## 2019-06-18 PROCEDURE — 83880 ASSAY OF NATRIURETIC PEPTIDE: CPT

## 2019-06-18 PROCEDURE — 87798 DETECT AGENT NOS DNA AMP: CPT

## 2019-06-18 PROCEDURE — 2580000003 HC RX 258: Performed by: FAMILY MEDICINE

## 2019-06-18 PROCEDURE — 83735 ASSAY OF MAGNESIUM: CPT

## 2019-06-18 PROCEDURE — 36415 COLL VENOUS BLD VENIPUNCTURE: CPT

## 2019-06-18 PROCEDURE — 94760 N-INVAS EAR/PLS OXIMETRY 1: CPT

## 2019-06-18 PROCEDURE — 87486 CHLMYD PNEUM DNA AMP PROBE: CPT

## 2019-06-18 PROCEDURE — 87581 M.PNEUMON DNA AMP PROBE: CPT

## 2019-06-18 PROCEDURE — 80048 BASIC METABOLIC PNL TOTAL CA: CPT

## 2019-06-18 RX ORDER — BUDESONIDE 0.5 MG/2ML
0.5 INHALANT ORAL 2 TIMES DAILY
Status: DISCONTINUED | OUTPATIENT
Start: 2019-06-18 | End: 2019-06-21 | Stop reason: HOSPADM

## 2019-06-18 RX ADMIN — Medication 10 ML: at 08:08

## 2019-06-18 RX ADMIN — INSULIN LISPRO 15 UNITS: 100 INJECTION, SOLUTION INTRAVENOUS; SUBCUTANEOUS at 17:39

## 2019-06-18 RX ADMIN — INSULIN LISPRO 18 UNITS: 100 INJECTION, SOLUTION INTRAVENOUS; SUBCUTANEOUS at 11:58

## 2019-06-18 RX ADMIN — METHYLPREDNISOLONE SODIUM SUCCINATE 60 MG: 125 INJECTION, POWDER, FOR SOLUTION INTRAMUSCULAR; INTRAVENOUS at 11:58

## 2019-06-18 RX ADMIN — IPRATROPIUM BROMIDE AND ALBUTEROL SULFATE 1 AMPULE: .5; 3 SOLUTION RESPIRATORY (INHALATION) at 07:45

## 2019-06-18 RX ADMIN — Medication 10 ML: at 22:00

## 2019-06-18 RX ADMIN — PRAVASTATIN SODIUM 10 MG: 20 TABLET ORAL at 22:00

## 2019-06-18 RX ADMIN — GLIMEPIRIDE 4 MG: 2 TABLET ORAL at 17:39

## 2019-06-18 RX ADMIN — PROBIOTIC PRODUCT - TAB 2 TABLET: TAB at 08:12

## 2019-06-18 RX ADMIN — CHOLECALCIFEROL CAP 125 MCG (5000 UNIT) 5000 UNITS: 125 CAP at 08:13

## 2019-06-18 RX ADMIN — MAGNESIUM OXIDE TAB 400 MG (241.3 MG ELEMENTAL MG) 400 MG: 400 (241.3 MG) TAB at 08:13

## 2019-06-18 RX ADMIN — METHYLPREDNISOLONE SODIUM SUCCINATE 60 MG: 125 INJECTION, POWDER, FOR SOLUTION INTRAMUSCULAR; INTRAVENOUS at 22:00

## 2019-06-18 RX ADMIN — METHYLPREDNISOLONE SODIUM SUCCINATE 60 MG: 125 INJECTION, POWDER, FOR SOLUTION INTRAMUSCULAR; INTRAVENOUS at 08:08

## 2019-06-18 RX ADMIN — METOPROLOL TARTRATE 37.5 MG: 25 TABLET ORAL at 22:00

## 2019-06-18 RX ADMIN — HYDROCHLOROTHIAZIDE 25 MG: 25 TABLET ORAL at 08:13

## 2019-06-18 RX ADMIN — BUDESONIDE 500 MCG: 0.5 SUSPENSION RESPIRATORY (INHALATION) at 11:29

## 2019-06-18 RX ADMIN — LOSARTAN POTASSIUM 100 MG: 100 TABLET, FILM COATED ORAL at 08:13

## 2019-06-18 RX ADMIN — SULFACETAMIDE SODIUM 1 DROP: 100 SOLUTION/ DROPS OPHTHALMIC at 12:09

## 2019-06-18 RX ADMIN — BUDESONIDE 500 MCG: 0.5 SUSPENSION RESPIRATORY (INHALATION) at 20:18

## 2019-06-18 RX ADMIN — IPRATROPIUM BROMIDE AND ALBUTEROL SULFATE 1 AMPULE: .5; 3 SOLUTION RESPIRATORY (INHALATION) at 11:28

## 2019-06-18 RX ADMIN — METOPROLOL TARTRATE 37.5 MG: 25 TABLET ORAL at 08:12

## 2019-06-18 RX ADMIN — METFORMIN HYDROCHLORIDE 1000 MG: 500 TABLET, EXTENDED RELEASE ORAL at 08:13

## 2019-06-18 RX ADMIN — INSULIN LISPRO 6 UNITS: 100 INJECTION, SOLUTION INTRAVENOUS; SUBCUTANEOUS at 22:09

## 2019-06-18 RX ADMIN — SULFACETAMIDE SODIUM 1 DROP: 100 SOLUTION/ DROPS OPHTHALMIC at 17:39

## 2019-06-18 RX ADMIN — GLIMEPIRIDE 4 MG: 2 TABLET ORAL at 08:13

## 2019-06-18 RX ADMIN — INSULIN GLARGINE 39 UNITS: 100 INJECTION, SOLUTION SUBCUTANEOUS at 22:08

## 2019-06-18 RX ADMIN — ANTACID TABLETS 500 MG: 500 TABLET, CHEWABLE ORAL at 08:13

## 2019-06-18 RX ADMIN — ASPIRIN 81 MG: 81 TABLET, COATED ORAL at 08:12

## 2019-06-18 RX ADMIN — AZITHROMYCIN MONOHYDRATE 500 MG: 500 INJECTION, POWDER, LYOPHILIZED, FOR SOLUTION INTRAVENOUS at 08:09

## 2019-06-18 RX ADMIN — SULFACETAMIDE SODIUM 1 DROP: 100 SOLUTION/ DROPS OPHTHALMIC at 08:09

## 2019-06-18 RX ADMIN — INSULIN LISPRO 10 UNITS: 100 INJECTION, SOLUTION INTRAVENOUS; SUBCUTANEOUS at 08:11

## 2019-06-18 RX ADMIN — SULFACETAMIDE SODIUM 1 DROP: 100 SOLUTION/ DROPS OPHTHALMIC at 22:01

## 2019-06-18 NOTE — CARE COORDINATION
Case Management Initial Discharge Plan  Jon Nolans,         Readmission Risk              Risk of Unplanned Readmission:        16             Met with:patient to discuss discharge plans. Information verified: address, contacts, phone number, , insurance Yes  PCP: Amanda Carlson MD  Date of last visit: one year ago-Dr Gamboa    Insurance Provider: Medicare/MMO    Discharge Planning  Current Residence:     Living Arrangements:  [place] Mar has one stories/3 with railing stairs to climb  Support Systems:  Family Members  Current Services PTA:    Supplier: none  Patient able to perform ADL's:Independent  DME used to aid ambulation prior to admission: none/during admissionTBD    Potential Assistance Needed:  N/A    Pharmacy: [facility]   Potential Assistance Purchasing Medications:  No  Does patient want to participate in local refill/ meds to beds program?  No    Patient agreeable to home care: No  [place] of choice provided:  n/a      Type of Home Care Services:  None  Patient expects to be discharged to:  home    Prior SNF/Rehab Placement and Facility: no  Agreeable to SNF/Rehab: No  [place] of choice provided: no   Evaluation: n/a    Expected Discharge date:  19  Follow Up Appointment: Best Day/ Time: Tuesday PM    Transportation provider: family  Transportation arrangements needed for discharge: No    Discharge Plan: Met with pt. She lives alone in one story home. She has walker,cane,shower seat, raised toilet seat. She states that she does not use assistive device to walk. Pt plans on returning home at [place] and anticipates no needs.  Flex Reed        Electronically signed by ELÍAS Condon on 19 at 4:05 PM

## 2019-06-18 NOTE — FLOWSHEET NOTE
06/18/19 0757   Provider Notification   Reason for Communication Critical Value (comment)  (BMP glucose of 408)   Provider Name [facility]   Provider Notification Physician   Method of Communication Page   Response Waiting for response   Notification Time [phone]      [facility] notified and is aware of prior sugars. Continue with current treatment including insulin and steroids.

## 2019-06-19 LAB
ABSOLUTE EOS #: 0 K/UL (ref 0–0.4)
ABSOLUTE IMMATURE GRANULOCYTE: 0.93 K/UL (ref 0–0.3)
ABSOLUTE LYMPH #: 1.49 K/UL (ref 1–4.8)
ABSOLUTE MONO #: 0.74 K/UL (ref 0.2–0.8)
ANION GAP SERPL CALCULATED.3IONS-SCNC: 15 MMOL/L (ref 9–17)
BASOPHILS # BLD: 0 %
BASOPHILS ABSOLUTE: 0 K/UL (ref 0–0.2)
BILIRUBIN URINE: NEGATIVE
BNP INTERPRETATION: ABNORMAL
BUN BLDV-MCNC: 57 MG/DL (ref 8–23)
BUN/CREAT BLD: 41 (ref 9–20)
CALCIUM SERPL-MCNC: 8.6 MG/DL (ref 8.6–10.4)
CHLORIDE BLD-SCNC: 98 MMOL/L (ref 98–107)
CHLORIDE, UR: 32 MMOL/L
CO2: 19 MMOL/L (ref 20–31)
COLOR: YELLOW
COMMENT UA: ABNORMAL
CREAT SERPL-MCNC: 1.39 MG/DL (ref 0.5–0.9)
DIFFERENTIAL TYPE: ABNORMAL
EOSINOPHILS RELATIVE PERCENT: 0 % (ref 1–4)
GFR AFRICAN AMERICAN: 45 ML/MIN
GFR NON-AFRICAN AMERICAN: 37 ML/MIN
GFR SERPL CREATININE-BSD FRML MDRD: ABNORMAL ML/MIN/{1.73_M2}
GFR SERPL CREATININE-BSD FRML MDRD: ABNORMAL ML/MIN/{1.73_M2}
GLUCOSE BLD-MCNC: 217 MG/DL (ref 70–99)
GLUCOSE BLD-MCNC: 270 MG/DL (ref 65–105)
GLUCOSE BLD-MCNC: 392 MG/DL (ref 65–105)
GLUCOSE BLD-MCNC: 402 MG/DL (ref 65–105)
GLUCOSE BLD-MCNC: 445 MG/DL (ref 65–105)
GLUCOSE URINE: ABNORMAL
HCT VFR BLD CALC: 31.9 % (ref 36.3–47.1)
HEMOGLOBIN: 10.9 G/DL (ref 11.9–15.1)
IMMATURE GRANULOCYTES: 5 %
KETONES, URINE: NEGATIVE
LEUKOCYTE ESTERASE, URINE: NEGATIVE
LYMPHOCYTES # BLD: 8 % (ref 24–44)
MAGNESIUM: 2.4 MG/DL (ref 1.6–2.6)
MCH RBC QN AUTO: 29.5 PG (ref 25.2–33.5)
MCHC RBC AUTO-ENTMCNC: 34.2 G/DL (ref 28.4–34.8)
MCV RBC AUTO: 86.4 FL (ref 82.6–102.9)
MONOCYTES # BLD: 4 % (ref 1–7)
NITRITE, URINE: NEGATIVE
NRBC AUTOMATED: 0 PER 100 WBC
PDW BLD-RTO: 12 % (ref 11.8–14.4)
PH UA: 5.5 (ref 5–8)
PLATELET # BLD: 262 K/UL (ref 138–453)
PLATELET ESTIMATE: ABNORMAL
PMV BLD AUTO: 10.9 FL (ref 8.1–13.5)
POTASSIUM SERPL-SCNC: 4.2 MMOL/L (ref 3.7–5.3)
POTASSIUM, UR: 21.1 MMOL/L
PRO-BNP: 1207 PG/ML
PROTEIN UA: NEGATIVE
RBC # BLD: 3.69 M/UL (ref 3.95–5.11)
RBC # BLD: ABNORMAL 10*6/UL
SEG NEUTROPHILS: 83 % (ref 36–66)
SEGMENTED NEUTROPHILS ABSOLUTE COUNT: 15.44 K/UL (ref 1.8–7.7)
SODIUM BLD-SCNC: 132 MMOL/L (ref 135–144)
SODIUM,UR: 42 MMOL/L
SPECIFIC GRAVITY UA: 1.01 (ref 1–1.03)
TURBIDITY: CLEAR
URINE HGB: NEGATIVE
UROBILINOGEN, URINE: NORMAL
WBC # BLD: 18.6 K/UL (ref 3.5–11.3)
WBC # BLD: ABNORMAL 10*3/UL

## 2019-06-19 PROCEDURE — 83880 ASSAY OF NATRIURETIC PEPTIDE: CPT

## 2019-06-19 PROCEDURE — 82947 ASSAY GLUCOSE BLOOD QUANT: CPT

## 2019-06-19 PROCEDURE — 80048 BASIC METABOLIC PNL TOTAL CA: CPT

## 2019-06-19 PROCEDURE — 36415 COLL VENOUS BLD VENIPUNCTURE: CPT

## 2019-06-19 PROCEDURE — 2580000003 HC RX 258: Performed by: INTERNAL MEDICINE

## 2019-06-19 PROCEDURE — 84300 ASSAY OF URINE SODIUM: CPT

## 2019-06-19 PROCEDURE — 6360000002 HC RX W HCPCS: Performed by: FAMILY MEDICINE

## 2019-06-19 PROCEDURE — 2580000003 HC RX 258: Performed by: FAMILY MEDICINE

## 2019-06-19 PROCEDURE — 84133 ASSAY OF URINE POTASSIUM: CPT

## 2019-06-19 PROCEDURE — 1200000000 HC SEMI PRIVATE

## 2019-06-19 PROCEDURE — 85025 COMPLETE CBC W/AUTO DIFF WBC: CPT

## 2019-06-19 PROCEDURE — 94640 AIRWAY INHALATION TREATMENT: CPT

## 2019-06-19 PROCEDURE — 83735 ASSAY OF MAGNESIUM: CPT

## 2019-06-19 PROCEDURE — 94760 N-INVAS EAR/PLS OXIMETRY 1: CPT

## 2019-06-19 PROCEDURE — 81003 URINALYSIS AUTO W/O SCOPE: CPT

## 2019-06-19 PROCEDURE — 6370000000 HC RX 637 (ALT 250 FOR IP): Performed by: FAMILY MEDICINE

## 2019-06-19 PROCEDURE — 82436 ASSAY OF URINE CHLORIDE: CPT

## 2019-06-19 RX ORDER — SODIUM CHLORIDE 9 MG/ML
INJECTION, SOLUTION INTRAVENOUS CONTINUOUS
Status: DISCONTINUED | OUTPATIENT
Start: 2019-06-19 | End: 2019-06-21 | Stop reason: HOSPADM

## 2019-06-19 RX ADMIN — BUDESONIDE 500 MCG: 0.5 SUSPENSION RESPIRATORY (INHALATION) at 20:59

## 2019-06-19 RX ADMIN — ASPIRIN 81 MG: 81 TABLET, COATED ORAL at 08:40

## 2019-06-19 RX ADMIN — ANTACID TABLETS 500 MG: 500 TABLET, CHEWABLE ORAL at 08:40

## 2019-06-19 RX ADMIN — SULFACETAMIDE SODIUM 1 DROP: 100 SOLUTION/ DROPS OPHTHALMIC at 12:26

## 2019-06-19 RX ADMIN — METOPROLOL TARTRATE 37.5 MG: 25 TABLET ORAL at 21:47

## 2019-06-19 RX ADMIN — INSULIN GLARGINE 39 UNITS: 100 INJECTION, SOLUTION SUBCUTANEOUS at 21:45

## 2019-06-19 RX ADMIN — INSULIN LISPRO 18 UNITS: 100 INJECTION, SOLUTION INTRAVENOUS; SUBCUTANEOUS at 12:26

## 2019-06-19 RX ADMIN — AZITHROMYCIN MONOHYDRATE 500 MG: 500 INJECTION, POWDER, LYOPHILIZED, FOR SOLUTION INTRAVENOUS at 08:36

## 2019-06-19 RX ADMIN — SODIUM CHLORIDE: 9 INJECTION, SOLUTION INTRAVENOUS at 08:44

## 2019-06-19 RX ADMIN — SODIUM CHLORIDE: 9 INJECTION, SOLUTION INTRAVENOUS at 22:01

## 2019-06-19 RX ADMIN — GLIMEPIRIDE 4 MG: 2 TABLET ORAL at 17:17

## 2019-06-19 RX ADMIN — INSULIN LISPRO 18 UNITS: 100 INJECTION, SOLUTION INTRAVENOUS; SUBCUTANEOUS at 17:17

## 2019-06-19 RX ADMIN — PROBIOTIC PRODUCT - TAB 2 TABLET: TAB at 08:40

## 2019-06-19 RX ADMIN — METHYLPREDNISOLONE SODIUM SUCCINATE 60 MG: 125 INJECTION, POWDER, FOR SOLUTION INTRAMUSCULAR; INTRAVENOUS at 05:42

## 2019-06-19 RX ADMIN — SULFACETAMIDE SODIUM 1 DROP: 100 SOLUTION/ DROPS OPHTHALMIC at 21:51

## 2019-06-19 RX ADMIN — INSULIN LISPRO 7 UNITS: 100 INJECTION, SOLUTION INTRAVENOUS; SUBCUTANEOUS at 21:45

## 2019-06-19 RX ADMIN — BUDESONIDE 500 MCG: 0.5 SUSPENSION RESPIRATORY (INHALATION) at 09:52

## 2019-06-19 RX ADMIN — PRAVASTATIN SODIUM 10 MG: 20 TABLET ORAL at 21:47

## 2019-06-19 RX ADMIN — METHYLPREDNISOLONE SODIUM SUCCINATE 60 MG: 125 INJECTION, POWDER, FOR SOLUTION INTRAMUSCULAR; INTRAVENOUS at 12:26

## 2019-06-19 RX ADMIN — MAGNESIUM OXIDE TAB 400 MG (241.3 MG ELEMENTAL MG) 400 MG: 400 (241.3 MG) TAB at 08:40

## 2019-06-19 RX ADMIN — SULFACETAMIDE SODIUM 1 DROP: 100 SOLUTION/ DROPS OPHTHALMIC at 08:35

## 2019-06-19 RX ADMIN — METOPROLOL TARTRATE 37.5 MG: 25 TABLET ORAL at 08:40

## 2019-06-19 RX ADMIN — SULFACETAMIDE SODIUM 1 DROP: 100 SOLUTION/ DROPS OPHTHALMIC at 17:17

## 2019-06-19 RX ADMIN — CHOLECALCIFEROL CAP 125 MCG (5000 UNIT) 5000 UNITS: 125 CAP at 08:40

## 2019-06-19 RX ADMIN — LOSARTAN POTASSIUM 100 MG: 100 TABLET, FILM COATED ORAL at 08:40

## 2019-06-19 RX ADMIN — GLIMEPIRIDE 4 MG: 2 TABLET ORAL at 08:40

## 2019-06-19 RX ADMIN — METHYLPREDNISOLONE SODIUM SUCCINATE 60 MG: 125 INJECTION, POWDER, FOR SOLUTION INTRAMUSCULAR; INTRAVENOUS at 21:46

## 2019-06-19 RX ADMIN — INSULIN LISPRO 9 UNITS: 100 INJECTION, SOLUTION INTRAVENOUS; SUBCUTANEOUS at 08:39

## 2019-06-19 RX ADMIN — HYDROCHLOROTHIAZIDE 25 MG: 25 TABLET ORAL at 08:40

## 2019-06-19 RX ADMIN — Medication 10 ML: at 08:35

## 2019-06-19 ASSESSMENT — PAIN SCALES - GENERAL: PAINLEVEL_OUTOF10: 0

## 2019-06-19 NOTE — CONSULTS
NEPHROLOGY CONSULT       Reason for Consult:  elevated creatinine      Chief Complaint:  SOB  History Obtained From:  Patient     History of Present Illness: This is a 78 y.o. female 70 y. o.with history of type 2 DM without nephropathy recent hbaic of 6.4 %,  hypertension and CKD stage 2 baseline who presented to the ER with complaint of shortness of breath.  Patient states she sprayed some deer and rabbit repellent in her yard 1 week ago and the following day started noticing some congestion in her chest.   She has had increasing SOB associated with cough of sputum and  Following that she has had increased shortness of breath and has been coughing up a lot of phlegm.  She denies fever or chills.  She had been on recent steroids and tessalon pearls in the hospital.  She had decrease in oral intake but no nausea or vomiting. She was t for a UTI about 3 weeks ago with bactrim. Pt denies any history of  prolonged NSAID use. Patient denies dysuria, gross hematuria, flank pain, nocturia, urgency, passing frothy urine or urinary incontinence. She is on Hctz and losartan 100 mg dly.     Past Medical History:        Diagnosis Date    Anemia     Arrhythmia     Arthritis     Asthma     CKD (chronic kidney disease) stage 1, GFR 90 ml/min or greater     DM type 2 [facility]di[facility] mellitus, type 2) [facility]     HTN (hypertension)     Hypomagnesemia        Past Surgical History:        Procedure Laterality Date    BUNIONECTOMY      CARPAL TUNNEL RELEASE Left 03/2018    CARPAL TUNNEL RELEASE Right 10/2018    CHOLECYSTECTOMY      FINGER TRIGGER RELEASE Right     ring finger    HERNIA REPAIR      HYSTERECTOMY      TONSILLECTOMY AND ADENOIDECTOMY         Current Medications:      insulin lispro (HUMALOG) injection vial 0-18 Units TID WC   insulin lispro (HUMALOG) injection vial 0-9 Units Nightly   budesonide (PULMICORT) nebulizer suspension 500 mcg BID   sodium chloride flush 0.9 % injection 10 mL 2 times per day   sodium Physical Exam:  GENERAL APPEARANCE: Alert and cooperative, and appears to be in no acute distress. HEAD: normocephalic  EYES: PERRL, EOMI. Not pale, anicteric   NOSE:  No nasal discharge. THROAT:  Oral cavity and pharynx normal. Moist  NECK: Neck supple, non-tender without lymphadenopathy, masses or thyromegaly. JVD-neg  CARDIAC: Normal S1 and S2. No S3, S4 or murmurs. Rhythm is regular. LUNGS: Clear to auscultation and percussion without rales, rhonchi, wheezing or diminished breath sounds. ABD-Soft non distended, BS+ Non tender no organomegally  BACK: Examination of the spine reveals  no spinal deformity, without tenderness,   MUSKULOSKELETAL: Adequately aligned spine. No joint erythema or tenderness. EXTREMITIES: No edema. Peripheral pulses intact. NEURO:Alert oriented x 3 ,power 5/5 in all extremities      Labs:   CBC:  Recent Labs     06/17/19 0633 06/18/19  0555 06/19/19  0411   WBC 11.4* 12.4* 18.6*   RBC 3.95 3.78* 3.69*   HGB 12.0 11.3* 10.9*   HCT 34.2* 33.2* 31.9*   MCV 86.6 87.8 86.4   MCH 30.4 29.9 29.5   MCHC 35.1* 34.0 34.2   RDW 12.2 12.1 12.0    234 262   MPV 12.2 11.6 10.9      BMP: Recent Labs     06/17/19 0633 06/18/19  0555 06/19/19  0411   * 128* 132*   K 4.2 4.4 4.2   CL 92* 96* 98   CO2 20 18* 19*   BUN 36* 44* 57*   CREATININE 1.37* 1.44* 1.39*   GLUCOSE 133* 408* 217*   CALCIUM 9.1 8.8 8.6        Phosphorus:  No results for input(s): PHOS in the last 72 hours.   Magnesium:   Recent Labs     06/17/19 0633 06/18/19  0555 06/19/19  0411   MG 2.0 2.4 2.4     Albumin:   Recent Labs     06/17/19 0633   LABALBU 4.3       IRON:  No results found for: IRON  Iron Saturation:  No components found for: PERCENTFE  TIBC:  No results found for: TIBC  FERRITIN:  No results found for: FERRITIN  SPEP: Lab Results   Component Value Date    PROT 7.4 06/17/2019     UPEP: No results found for: TPU     C3: No results found for: C3  C4: No results found for: C4  MPO ANCA:  No CKD clinic 7/1/19. Thank you for the consultation.       Electronically signed by Cayetano Garces MD on 6/19/2019 at 7:57 AM

## 2019-06-20 LAB
ABSOLUTE EOS #: 0 K/UL (ref 0–0.44)
ABSOLUTE IMMATURE GRANULOCYTE: 1.86 K/UL (ref 0–0.3)
ABSOLUTE LYMPH #: 2.09 K/UL (ref 1.1–3.7)
ABSOLUTE MONO #: 0.93 K/UL (ref 0.1–1.2)
ANION GAP SERPL CALCULATED.3IONS-SCNC: 14 MMOL/L (ref 9–17)
BASOPHILS # BLD: 1 % (ref 0–2)
BASOPHILS ABSOLUTE: 0.23 K/UL (ref 0–0.2)
BETA-HYDROXYBUTYRATE: 0.1 MMOL/L (ref 0.02–0.27)
BNP INTERPRETATION: ABNORMAL
BUN BLDV-MCNC: 53 MG/DL (ref 8–23)
BUN/CREAT BLD: 47 (ref 9–20)
CALCIUM SERPL-MCNC: 8.6 MG/DL (ref 8.6–10.4)
CHLORIDE BLD-SCNC: 103 MMOL/L (ref 98–107)
CO2: 18 MMOL/L (ref 20–31)
CREAT SERPL-MCNC: 1.13 MG/DL (ref 0.5–0.9)
DIFFERENTIAL TYPE: ABNORMAL
EOSINOPHILS RELATIVE PERCENT: 0 % (ref 1–4)
GFR AFRICAN AMERICAN: 58 ML/MIN
GFR NON-AFRICAN AMERICAN: 48 ML/MIN
GFR SERPL CREATININE-BSD FRML MDRD: ABNORMAL ML/MIN/{1.73_M2}
GFR SERPL CREATININE-BSD FRML MDRD: ABNORMAL ML/MIN/{1.73_M2}
GLUCOSE BLD-MCNC: 232 MG/DL (ref 65–105)
GLUCOSE BLD-MCNC: 246 MG/DL (ref 70–99)
GLUCOSE BLD-MCNC: 362 MG/DL (ref 65–105)
GLUCOSE BLD-MCNC: 367 MG/DL (ref 65–105)
GLUCOSE BLD-MCNC: 429 MG/DL (ref 65–105)
HCT VFR BLD CALC: 37.5 % (ref 36.3–47.1)
HEMOGLOBIN: 12.5 G/DL (ref 11.9–15.1)
IMMATURE GRANULOCYTES: 8 %
LACTIC ACID: 2.2 MMOL/L (ref 0.5–2.2)
LYMPHOCYTES # BLD: 9 % (ref 24–43)
MAGNESIUM: 2.4 MG/DL (ref 1.6–2.6)
MCH RBC QN AUTO: 30 PG (ref 25.2–33.5)
MCHC RBC AUTO-ENTMCNC: 33.3 G/DL (ref 28.4–34.8)
MCV RBC AUTO: 89.9 FL (ref 82.6–102.9)
MONOCYTES # BLD: 4 % (ref 3–12)
NRBC AUTOMATED: 0 PER 100 WBC
PDW BLD-RTO: 12.3 % (ref 11.8–14.4)
PLATELET # BLD: 275 K/UL (ref 138–453)
PLATELET ESTIMATE: ABNORMAL
PMV BLD AUTO: 10.9 FL (ref 8.1–13.5)
POTASSIUM SERPL-SCNC: 4.2 MMOL/L (ref 3.7–5.3)
PRO-BNP: 1105 PG/ML
RBC # BLD: 4.17 M/UL (ref 3.95–5.11)
RBC # BLD: ABNORMAL 10*6/UL
SEG NEUTROPHILS: 78 % (ref 36–65)
SEGMENTED NEUTROPHILS ABSOLUTE COUNT: 18.09 K/UL (ref 1.5–8.1)
SODIUM BLD-SCNC: 135 MMOL/L (ref 135–144)
WBC # BLD: 23.2 K/UL (ref 3.5–11.3)
WBC # BLD: ABNORMAL 10*3/UL

## 2019-06-20 PROCEDURE — 83605 ASSAY OF LACTIC ACID: CPT

## 2019-06-20 PROCEDURE — 80048 BASIC METABOLIC PNL TOTAL CA: CPT

## 2019-06-20 PROCEDURE — 36415 COLL VENOUS BLD VENIPUNCTURE: CPT

## 2019-06-20 PROCEDURE — 2580000003 HC RX 258: Performed by: FAMILY MEDICINE

## 2019-06-20 PROCEDURE — 6370000000 HC RX 637 (ALT 250 FOR IP): Performed by: INTERNAL MEDICINE

## 2019-06-20 PROCEDURE — 82947 ASSAY GLUCOSE BLOOD QUANT: CPT

## 2019-06-20 PROCEDURE — 85025 COMPLETE CBC W/AUTO DIFF WBC: CPT

## 2019-06-20 PROCEDURE — 6360000002 HC RX W HCPCS: Performed by: FAMILY MEDICINE

## 2019-06-20 PROCEDURE — 83735 ASSAY OF MAGNESIUM: CPT

## 2019-06-20 PROCEDURE — 1200000000 HC SEMI PRIVATE

## 2019-06-20 PROCEDURE — 6370000000 HC RX 637 (ALT 250 FOR IP): Performed by: FAMILY MEDICINE

## 2019-06-20 PROCEDURE — 94640 AIRWAY INHALATION TREATMENT: CPT

## 2019-06-20 PROCEDURE — 94760 N-INVAS EAR/PLS OXIMETRY 1: CPT

## 2019-06-20 PROCEDURE — 83880 ASSAY OF NATRIURETIC PEPTIDE: CPT

## 2019-06-20 PROCEDURE — 82010 KETONE BODYS QUAN: CPT

## 2019-06-20 RX ORDER — SULFACETAMIDE SODIUM 100 MG/ML
1 SOLUTION/ DROPS OPHTHALMIC 4 TIMES DAILY
Qty: 5 ML | Refills: 0 | Status: CANCELLED | OUTPATIENT
Start: 2019-06-20 | End: 2019-06-23

## 2019-06-20 RX ORDER — SODIUM BICARBONATE 650 MG/1
650 TABLET ORAL 2 TIMES DAILY
Status: DISCONTINUED | OUTPATIENT
Start: 2019-06-20 | End: 2019-06-21 | Stop reason: HOSPADM

## 2019-06-20 RX ORDER — METHYLPREDNISOLONE SODIUM SUCCINATE 125 MG/2ML
60 INJECTION, POWDER, LYOPHILIZED, FOR SOLUTION INTRAMUSCULAR; INTRAVENOUS DAILY
Status: DISCONTINUED | OUTPATIENT
Start: 2019-06-21 | End: 2019-06-21 | Stop reason: HOSPADM

## 2019-06-20 RX ADMIN — CHOLECALCIFEROL CAP 125 MCG (5000 UNIT) 5000 UNITS: 125 CAP at 08:51

## 2019-06-20 RX ADMIN — METOPROLOL TARTRATE 37.5 MG: 25 TABLET ORAL at 08:50

## 2019-06-20 RX ADMIN — INSULIN LISPRO 15 UNITS: 100 INJECTION, SOLUTION INTRAVENOUS; SUBCUTANEOUS at 17:03

## 2019-06-20 RX ADMIN — INSULIN LISPRO 18 UNITS: 100 INJECTION, SOLUTION INTRAVENOUS; SUBCUTANEOUS at 12:18

## 2019-06-20 RX ADMIN — GLIMEPIRIDE 4 MG: 2 TABLET ORAL at 17:03

## 2019-06-20 RX ADMIN — PROBIOTIC PRODUCT - TAB 2 TABLET: TAB at 08:51

## 2019-06-20 RX ADMIN — METHYLPREDNISOLONE SODIUM SUCCINATE 60 MG: 125 INJECTION, POWDER, FOR SOLUTION INTRAMUSCULAR; INTRAVENOUS at 05:21

## 2019-06-20 RX ADMIN — ANTACID TABLETS 500 MG: 500 TABLET, CHEWABLE ORAL at 08:51

## 2019-06-20 RX ADMIN — SODIUM BICARBONATE 650 MG: 650 TABLET ORAL at 20:47

## 2019-06-20 RX ADMIN — SODIUM BICARBONATE 650 MG: 650 TABLET ORAL at 08:51

## 2019-06-20 RX ADMIN — HYDROCHLOROTHIAZIDE 25 MG: 25 TABLET ORAL at 08:51

## 2019-06-20 RX ADMIN — INSULIN LISPRO 6 UNITS: 100 INJECTION, SOLUTION INTRAVENOUS; SUBCUTANEOUS at 08:50

## 2019-06-20 RX ADMIN — SULFACETAMIDE SODIUM 1 DROP: 100 SOLUTION/ DROPS OPHTHALMIC at 12:18

## 2019-06-20 RX ADMIN — INSULIN GLARGINE 39 UNITS: 100 INJECTION, SOLUTION SUBCUTANEOUS at 21:03

## 2019-06-20 RX ADMIN — METOPROLOL TARTRATE 37.5 MG: 25 TABLET ORAL at 20:47

## 2019-06-20 RX ADMIN — MAGNESIUM OXIDE TAB 400 MG (241.3 MG ELEMENTAL MG) 400 MG: 400 (241.3 MG) TAB at 08:51

## 2019-06-20 RX ADMIN — GLIMEPIRIDE 4 MG: 2 TABLET ORAL at 08:51

## 2019-06-20 RX ADMIN — LOSARTAN POTASSIUM 100 MG: 100 TABLET, FILM COATED ORAL at 08:51

## 2019-06-20 RX ADMIN — ASPIRIN 81 MG: 81 TABLET, COATED ORAL at 08:51

## 2019-06-20 RX ADMIN — PRAVASTATIN SODIUM 10 MG: 20 TABLET ORAL at 20:47

## 2019-06-20 RX ADMIN — SULFACETAMIDE SODIUM 1 DROP: 100 SOLUTION/ DROPS OPHTHALMIC at 08:50

## 2019-06-20 RX ADMIN — Medication 10 ML: at 20:48

## 2019-06-20 RX ADMIN — BUDESONIDE 500 MCG: 0.5 SUSPENSION RESPIRATORY (INHALATION) at 09:26

## 2019-06-20 RX ADMIN — AZITHROMYCIN MONOHYDRATE 500 MG: 500 INJECTION, POWDER, LYOPHILIZED, FOR SOLUTION INTRAVENOUS at 08:49

## 2019-06-20 RX ADMIN — SULFACETAMIDE SODIUM 1 DROP: 100 SOLUTION/ DROPS OPHTHALMIC at 20:47

## 2019-06-20 RX ADMIN — SULFACETAMIDE SODIUM 1 DROP: 100 SOLUTION/ DROPS OPHTHALMIC at 17:03

## 2019-06-20 RX ADMIN — INSULIN LISPRO 7 UNITS: 100 INJECTION, SOLUTION INTRAVENOUS; SUBCUTANEOUS at 21:03

## 2019-06-20 RX ADMIN — BUDESONIDE 500 MCG: 0.5 SUSPENSION RESPIRATORY (INHALATION) at 20:09

## 2019-06-21 VITALS
OXYGEN SATURATION: 98 % | WEIGHT: 219.5 LBS | DIASTOLIC BLOOD PRESSURE: 37 MMHG | SYSTOLIC BLOOD PRESSURE: 118 MMHG | BODY MASS INDEX: 41.44 KG/M2 | HEART RATE: 61 BPM | HEIGHT: 61 IN | TEMPERATURE: 98.4 F | RESPIRATION RATE: 18 BRPM

## 2019-06-21 LAB
ABSOLUTE EOS #: 0 K/UL (ref 0–0.4)
ABSOLUTE IMMATURE GRANULOCYTE: 0 K/UL (ref 0–0.3)
ABSOLUTE LYMPH #: 6.16 K/UL (ref 1–4.8)
ABSOLUTE MONO #: 2.37 K/UL (ref 0.2–0.8)
ANION GAP SERPL CALCULATED.3IONS-SCNC: 12 MMOL/L (ref 9–17)
BASOPHILS # BLD: 0 %
BASOPHILS ABSOLUTE: 0 K/UL (ref 0–0.2)
BNP INTERPRETATION: ABNORMAL
BUN BLDV-MCNC: 47 MG/DL (ref 8–23)
BUN/CREAT BLD: 39 (ref 9–20)
CALCIUM SERPL-MCNC: 8.3 MG/DL (ref 8.6–10.4)
CHLORIDE BLD-SCNC: 103 MMOL/L (ref 98–107)
CO2: 23 MMOL/L (ref 20–31)
CREAT SERPL-MCNC: 1.21 MG/DL (ref 0.5–0.9)
DIFFERENTIAL TYPE: ABNORMAL
EOSINOPHILS RELATIVE PERCENT: 0 % (ref 1–4)
GFR AFRICAN AMERICAN: 53 ML/MIN
GFR NON-AFRICAN AMERICAN: 44 ML/MIN
GFR SERPL CREATININE-BSD FRML MDRD: ABNORMAL ML/MIN/{1.73_M2}
GFR SERPL CREATININE-BSD FRML MDRD: ABNORMAL ML/MIN/{1.73_M2}
GLUCOSE BLD-MCNC: 127 MG/DL (ref 70–99)
GLUCOSE BLD-MCNC: 298 MG/DL (ref 65–105)
GLUCOSE BLD-MCNC: 76 MG/DL (ref 65–105)
HCT VFR BLD CALC: 36 % (ref 36.3–47.1)
HEMOGLOBIN: 12.4 G/DL (ref 11.9–15.1)
IMMATURE GRANULOCYTES: 0 %
LYMPHOCYTES # BLD: 26 % (ref 24–44)
MAGNESIUM: 2.4 MG/DL (ref 1.6–2.6)
MCH RBC QN AUTO: 30 PG (ref 25.2–33.5)
MCHC RBC AUTO-ENTMCNC: 34.4 G/DL (ref 28.4–34.8)
MCV RBC AUTO: 87 FL (ref 82.6–102.9)
MONOCYTES # BLD: 10 % (ref 1–7)
MORPHOLOGY: ABNORMAL
NRBC AUTOMATED: ABNORMAL PER 100 WBC
PDW BLD-RTO: 12.3 % (ref 11.8–14.4)
PLATELET # BLD: 320 K/UL (ref 138–453)
PLATELET ESTIMATE: ABNORMAL
PMV BLD AUTO: 10.6 FL (ref 8.1–13.5)
POTASSIUM SERPL-SCNC: 3.9 MMOL/L (ref 3.7–5.3)
PRO-BNP: 919 PG/ML
RBC # BLD: 4.14 M/UL (ref 3.95–5.11)
RBC # BLD: ABNORMAL 10*6/UL
SEG NEUTROPHILS: 64 % (ref 36–66)
SEGMENTED NEUTROPHILS ABSOLUTE COUNT: 15.17 K/UL (ref 1.8–7.7)
SODIUM BLD-SCNC: 138 MMOL/L (ref 135–144)
WBC # BLD: 23.7 K/UL (ref 3.5–11.3)
WBC # BLD: ABNORMAL 10*3/UL

## 2019-06-21 PROCEDURE — 83735 ASSAY OF MAGNESIUM: CPT

## 2019-06-21 PROCEDURE — 6360000002 HC RX W HCPCS: Performed by: FAMILY MEDICINE

## 2019-06-21 PROCEDURE — 83880 ASSAY OF NATRIURETIC PEPTIDE: CPT

## 2019-06-21 PROCEDURE — 80048 BASIC METABOLIC PNL TOTAL CA: CPT

## 2019-06-21 PROCEDURE — 6370000000 HC RX 637 (ALT 250 FOR IP): Performed by: FAMILY MEDICINE

## 2019-06-21 PROCEDURE — 6370000000 HC RX 637 (ALT 250 FOR IP): Performed by: INTERNAL MEDICINE

## 2019-06-21 PROCEDURE — 36415 COLL VENOUS BLD VENIPUNCTURE: CPT

## 2019-06-21 PROCEDURE — 94640 AIRWAY INHALATION TREATMENT: CPT

## 2019-06-21 PROCEDURE — 2580000003 HC RX 258: Performed by: FAMILY MEDICINE

## 2019-06-21 PROCEDURE — 85025 COMPLETE CBC W/AUTO DIFF WBC: CPT

## 2019-06-21 PROCEDURE — 82947 ASSAY GLUCOSE BLOOD QUANT: CPT

## 2019-06-21 RX ORDER — GLIMEPIRIDE 4 MG/1
4 TABLET ORAL 2 TIMES DAILY WITH MEALS
Qty: 30 TABLET | Refills: 3 | Status: ON HOLD | OUTPATIENT
Start: 2019-06-21 | End: 2021-07-10 | Stop reason: HOSPADM

## 2019-06-21 RX ORDER — BUDESONIDE 0.5 MG/2ML
0.5 INHALANT ORAL 2 TIMES DAILY
Qty: 60 AMPULE | Refills: 3 | Status: SHIPPED | OUTPATIENT
Start: 2019-06-21 | End: 2020-01-24

## 2019-06-21 RX ORDER — CALCIUM CARBONATE 200(500)MG
500 TABLET,CHEWABLE ORAL DAILY
Qty: 30 TABLET | Refills: 0 | Status: SHIPPED | OUTPATIENT
Start: 2019-06-21 | End: 2019-07-21

## 2019-06-21 RX ORDER — AZITHROMYCIN 250 MG/1
250 TABLET, FILM COATED ORAL DAILY
Qty: 4 TABLET | Refills: 0 | Status: SHIPPED | OUTPATIENT
Start: 2019-06-21 | End: 2019-06-25

## 2019-06-21 RX ORDER — PREDNISONE 20 MG/1
TABLET ORAL
Qty: 17 TABLET | Refills: 0 | Status: SHIPPED | OUTPATIENT
Start: 2019-06-21 | End: 2019-07-06

## 2019-06-21 RX ORDER — INSULIN GLARGINE 100 [IU]/ML
39 INJECTION, SOLUTION SUBCUTANEOUS NIGHTLY
Qty: 1 VIAL | Refills: 3 | Status: SHIPPED | OUTPATIENT
Start: 2019-06-21 | End: 2019-08-19 | Stop reason: ALTCHOICE

## 2019-06-21 RX ORDER — METOPROLOL TARTRATE 37.5 MG/1
37.5 TABLET, FILM COATED ORAL 2 TIMES DAILY
Qty: 60 TABLET | Refills: 3 | Status: ON HOLD | OUTPATIENT
Start: 2019-06-21 | End: 2021-07-06

## 2019-06-21 RX ORDER — SODIUM BICARBONATE 650 MG/1
650 TABLET ORAL 2 TIMES DAILY
Qty: 60 TABLET | Refills: 0 | Status: SHIPPED | OUTPATIENT
Start: 2019-06-21 | End: 2019-07-21

## 2019-06-21 RX ORDER — PRAVASTATIN SODIUM 10 MG
10 TABLET ORAL NIGHTLY
Qty: 30 TABLET | Refills: 3 | Status: SHIPPED | OUTPATIENT
Start: 2019-06-21

## 2019-06-21 RX ORDER — HYDROCHLOROTHIAZIDE 25 MG/1
25 TABLET ORAL DAILY
Qty: 30 TABLET | Refills: 3 | Status: ON HOLD | OUTPATIENT
Start: 2019-06-21 | End: 2021-07-06

## 2019-06-21 RX ORDER — ASPIRIN 81 MG/1
81 TABLET ORAL DAILY
Qty: 30 TABLET | Refills: 3 | Status: ON HOLD | OUTPATIENT
Start: 2019-06-21 | End: 2021-07-10 | Stop reason: HOSPADM

## 2019-06-21 RX ADMIN — METHYLPREDNISOLONE SODIUM SUCCINATE 60 MG: 125 INJECTION, POWDER, FOR SOLUTION INTRAMUSCULAR; INTRAVENOUS at 09:19

## 2019-06-21 RX ADMIN — PROBIOTIC PRODUCT - TAB 2 TABLET: TAB at 09:19

## 2019-06-21 RX ADMIN — ANTACID TABLETS 500 MG: 500 TABLET, CHEWABLE ORAL at 09:21

## 2019-06-21 RX ADMIN — MAGNESIUM OXIDE TAB 400 MG (241.3 MG ELEMENTAL MG) 400 MG: 400 (241.3 MG) TAB at 09:21

## 2019-06-21 RX ADMIN — AZITHROMYCIN MONOHYDRATE 500 MG: 500 INJECTION, POWDER, LYOPHILIZED, FOR SOLUTION INTRAVENOUS at 09:21

## 2019-06-21 RX ADMIN — Medication 10 ML: at 09:22

## 2019-06-21 RX ADMIN — CHOLECALCIFEROL CAP 125 MCG (5000 UNIT) 5000 UNITS: 125 CAP at 09:21

## 2019-06-21 RX ADMIN — BUDESONIDE 500 MCG: 0.5 SUSPENSION RESPIRATORY (INHALATION) at 09:32

## 2019-06-21 RX ADMIN — SODIUM BICARBONATE 650 MG: 650 TABLET ORAL at 09:19

## 2019-06-21 RX ADMIN — ASPIRIN 81 MG: 81 TABLET, COATED ORAL at 09:21

## 2019-06-21 RX ADMIN — INSULIN LISPRO 9 UNITS: 100 INJECTION, SOLUTION INTRAVENOUS; SUBCUTANEOUS at 12:20

## 2019-06-21 RX ADMIN — SULFACETAMIDE SODIUM 1 DROP: 100 SOLUTION/ DROPS OPHTHALMIC at 09:19

## 2019-06-21 RX ADMIN — GLIMEPIRIDE 4 MG: 2 TABLET ORAL at 09:20

## 2019-06-21 NOTE — CARE COORDINATION
Pt being discharged today and wants to make her own hospital F/U appointment Monday with PCP. Office is closed on Fridays.   Has F/U with Dr. Diego Howell 7-1-19 at 11:15am.

## 2019-06-21 NOTE — PLAN OF CARE
Problem: Falls - Risk of:  Goal: Will remain free from falls  Description  Will remain free from falls  6/20/2019 2353 by Mathew Alex RN  Outcome: Ongoing     Problem: Falls - Risk of:  Goal: Absence of physical injury  Description  Absence of physical injury  6/20/2019 2353 by Mathew Alex RN  Outcome: Ongoing     Problem: Discharge Planning:  Goal: Discharged to appropriate level of care  Description  Discharged to appropriate level of care  6/20/2019 2353 by Mathew Alex RN  Outcome: Ongoing     Problem:  Activity Intolerance:  Goal: Ability to perform activities of daily living will improve  Description  Ability to perform activities of daily living will improve  6/20/2019 2353 by Mathew Alex RN  Outcome: Ongoing     Problem: Airway Clearance - Ineffective:  Goal: Ability to maintain a clear airway will improve  Description  Ability to maintain a clear airway will improve  6/20/2019 2353 by Mathew Alex RN  Outcome: Ongoing     Problem: Gas Exchange - Impaired:  Goal: Levels of oxygenation will improve  Description  Levels of oxygenation will improve  6/20/2019 2353 by Mathew Alex RN  Outcome: Ongoing     Problem: Serum Glucose Level - Abnormal:  Goal: Ability to maintain appropriate glucose levels will improve  Description  Ability to maintain appropriate glucose levels will improve  6/20/2019 2353 by Mathew Alex RN  Outcome: Ongoing
Problem: Pain:  Goal: Pain level will decrease  Description  Pain level will decrease  Outcome: Ongoing     Problem: Pain:  Goal: Control of acute pain  Description  Control of acute pain  Outcome: Ongoing     Problem: Pain:  Goal: Control of chronic pain  Description  Control of chronic pain  Outcome: Ongoing     Problem: Falls - Risk of:  Goal: Will remain free from falls  Description  Will remain free from falls  Outcome: Ongoing     Problem: Falls - Risk of:  Goal: Absence of physical injury  Description  Absence of physical injury  Outcome: Ongoing     Problem:  Activity Intolerance:  Goal: Ability to perform activities of daily living will improve  Description  Ability to perform activities of daily living will improve  Outcome: Ongoing     Problem: Airway Clearance - Ineffective:  Goal: Ability to maintain a clear airway will improve  Description  Ability to maintain a clear airway will improve  Outcome: Ongoing     Problem: Gas Exchange - Impaired:  Goal: Levels of oxygenation will improve  Description  Levels of oxygenation will improve  Outcome: Ongoing     Problem: Serum Glucose Level - Abnormal:  Goal: Ability to maintain appropriate glucose levels will improve  Description  Ability to maintain appropriate glucose levels will improve  Outcome: Ongoing     Problem: Sensory Perception - Impaired:  Goal: Ability to maintain a stable neurologic state will improve  Description  Ability to maintain a stable neurologic state will improve  Outcome: Ongoing
Problem: Pain:  Goal: Pain level will decrease  Description  Pain level will decrease  Outcome: Ongoing  Goal: Control of acute pain  Description  Control of acute pain  Outcome: Ongoing  Goal: Control of chronic pain  Description  Control of chronic pain  Outcome: Ongoing     Problem: Falls - Risk of:  Goal: Will remain free from falls  Description  Will remain free from falls  6/21/2019 0959 by Miguel Hayes RN  Outcome: Ongoing  6/20/2019 2353 by Silvina Garcia RN  Outcome: Ongoing  Goal: Absence of physical injury  Description  Absence of physical injury  6/21/2019 0959 by Miguel Hayes RN  Outcome: Ongoing  6/20/2019 2353 by Silvina Garcia RN  Outcome: Ongoing     Problem: SAFETY  Goal: LTG - patient will adhere to hip precautions during ADL's and transfers  Outcome: Ongoing  Goal: LTG - Patient will demonstrate safety requirements appropriate to situation/environment  Outcome: Ongoing  Goal: LTG - patient will utilize safety techniques  Outcome: Ongoing  Goal: STG - patient locks brakes on wheelchair  Outcome: Ongoing  Goal: STG - Patient uses call light consistently to request assistance with transfers  Outcome: Ongoing  Goal: STG - patient uses gait belt during all transfers  Outcome: Ongoing     Problem: Discharge Planning:  Goal: Discharged to appropriate level of care  Description  Discharged to appropriate level of care  6/21/2019 0959 by Miguel Hayes RN  Outcome: Ongoing  6/20/2019 2353 by Silvina Garcia RN  Outcome: Ongoing     Problem:  Activity Intolerance:  Goal: Ability to perform activities of daily living will improve  Description  Ability to perform activities of daily living will improve  6/21/2019 0959 by Miguel Hayes RN  Outcome: Ongoing  6/20/2019 2353 by Silvina Garcia RN  Outcome: Ongoing     Problem: Airway Clearance - Ineffective:  Goal: Ability to maintain a clear airway will improve  Description  Ability to maintain a clear airway will improve  6/21/2019 0959
Gerson Stroud RN  Outcome: Ongoing     Problem: Airway Clearance - Ineffective:  Goal: Ability to maintain a clear airway will improve  Description  Ability to maintain a clear airway will improve  6/18/2019 0822 by Igor Rennre RN  Outcome: Ongoing  6/18/2019 0200 by Asher Lees RN  Outcome: Ongoing     Problem: Gas Exchange - Impaired:  Goal: Levels of oxygenation will improve  Description  Levels of oxygenation will improve  6/18/2019 0822 by Igor Renner RN  Outcome: Ongoing  6/18/2019 0200 by Asher Lees RN  Outcome: Ongoing     Problem: Mood - Altered:  Goal: Emotional status will stabilize  Description  Emotional status will stabilize  Outcome: Ongoing     Problem: Tobacco Use:  Goal: Will participate in inpatient tobacco-use cessation counseling  Description  Will participate in inpatient tobacco-use cessation counseling  Outcome: Ongoing     Problem: Serum Glucose Level - Abnormal:  Goal: Ability to maintain appropriate glucose levels will improve  Description  Ability to maintain appropriate glucose levels will improve  6/18/2019 0822 by Igor Renner RN  Outcome: Ongoing  6/18/2019 0200 by Asher Lees RN  Outcome: Ongoing     Problem: Sensory Perception - Impaired:  Goal: Ability to maintain a stable neurologic state will improve  Description  Ability to maintain a stable neurologic state will improve  6/18/2019 0822 by Igor Renner RN  Outcome: Ongoing  6/18/2019 0200 by Asher Lees RN  Outcome: Ongoing     Problem: Musculor/Skeletal Functional Status  Goal: Highest potential functional level  Outcome: Ongoing     Problem: Nutrition  Goal: Optimal nutrition therapy  Description  Nutrition Problem: Inadequate oral intake  Intervention: Food and/or Nutrient Delivery: Continue current diet  Nutritional Goals: PO intake to meet >75% of estimated kcal/protein needs, with good glycemic control   Outcome: Ongoing

## 2019-06-21 NOTE — DISCHARGE SUMMARY
(HYDRODIURIL) 25 MG tablet  Take 1 tablet by mouth daily             insulin glargine (LANTUS) 100 UNIT/ML injection vial  Inject 39 Units into the skin nightly             lactobacillus (BACID) TABS  Take 2 tablets by mouth daily for 20 days             losartan (COZAAR) 100 MG tablet  Take 1 tablet by mouth daily             magnesium gluconate (MAGONATE) 500 MG tablet  Take 500 mg by mouth daily             Metoprolol Tartrate 37.5 MG TABS  Take 37.5 mg by mouth 2 times daily             pravastatin (PRAVACHOL) 10 MG tablet  Take 1 tablet by mouth nightly             predniSONE (DELTASONE) 20 MG tablet  Take 2 tablets by mouth daily for 3 days, THEN 1.5 tablets daily for 3 days, THEN 1 tablet daily for 3 days, THEN 0.5 tablets daily for 3 days, THEN 0.5 tablets daily for 3 days.              sodium bicarbonate 650 MG tablet  Take 1 tablet by mouth 2 times daily             vitamin B-12 (CYANOCOBALAMIN) 1000 MCG tablet  Take 1,000 mcg by mouth daily             vitamin D (CHOLECALCIFEROL) 5000 units CAPS capsule  Take 1 capsule by mouth daily                  Medication List      START taking these medications    aspirin 81 MG EC tablet  Take 1 tablet by mouth daily  Replaces:  aspirin 81 MG tablet     azithromycin 250 MG tablet  Commonly known as:  ZITHROMAX Z-ARABELLA  Take 1 tablet by mouth daily for 4 days Take 2 tablets (500 mg) on Day 1, and then take 1 tablet (250 mg) on days 2 through 5.     budesonide 0.5 MG/2ML nebulizer suspension  Commonly known as:  PULMICORT  Take 2 mLs by nebulization 2 times daily     calcium carbonate 500 MG chewable tablet  Commonly known as:  TUMS  Take 1 tablet by mouth daily     insulin glargine 100 UNIT/ML injection vial  Commonly known as:  LANTUS  Inject 39 Units into the skin nightly  Replaces:  BASAGLAR KWIKPEN 100 UNIT/ML injection pen     lactobacillus Tabs  Take 2 tablets by mouth daily for 20 days  Replaces:  PROBIOTIC DAILY PO     sodium bicarbonate 650 MG tablet  Take 1

## 2019-06-22 NOTE — PROGRESS NOTES
Call received from patient regarding scripts. Patient was not able to  script from pharmacy. Epic did indicate scripts were received. Pharmacy indicated scripts were not received. [facility] Patient informed and will be in to pharmacy to  soon.

## 2019-06-26 ENCOUNTER — HOSPITAL ENCOUNTER (OUTPATIENT)
Age: 69
Setting detail: SPECIMEN
Discharge: HOME | End: 2019-06-26
Payer: MEDICARE

## 2019-06-26 DIAGNOSIS — E55.9 VITAMIN D DEFICIENCY: ICD-10-CM

## 2019-06-26 DIAGNOSIS — N18.30 BENIGN HYPERTENSION WITH CKD (CHRONIC KIDNEY DISEASE) STAGE III (HCC): ICD-10-CM

## 2019-06-26 DIAGNOSIS — E83.39 HYPERPHOSPHATEMIA: ICD-10-CM

## 2019-06-26 DIAGNOSIS — N18.30 CKD (CHRONIC KIDNEY DISEASE) STAGE 3, GFR 30-59 ML/MIN (HCC): ICD-10-CM

## 2019-06-26 DIAGNOSIS — I12.9 BENIGN HYPERTENSION WITH CKD (CHRONIC KIDNEY DISEASE) STAGE III (HCC): ICD-10-CM

## 2019-06-26 LAB
ABSOLUTE EOS #: 0 K/UL (ref 0–0.4)
ABSOLUTE IMMATURE GRANULOCYTE: 0.16 K/UL (ref 0–0.3)
ABSOLUTE LYMPH #: 3.89 K/UL (ref 1–4.8)
ABSOLUTE MONO #: 1.3 K/UL (ref 0.1–0.8)
ANION GAP SERPL CALCULATED.3IONS-SCNC: 13 MMOL/L (ref 9–17)
BASOPHILS # BLD: 0 % (ref 0–2)
BASOPHILS ABSOLUTE: 0 K/UL (ref 0–0.2)
BUN BLDV-MCNC: 46 MG/DL (ref 8–23)
CALCIUM SERPL-MCNC: 8.6 MG/DL (ref 8.6–10.4)
CHLORIDE BLD-SCNC: 103 MMOL/L (ref 98–107)
CO2: 22 MMOL/L (ref 20–31)
CREAT SERPL-MCNC: 1.26 MG/DL (ref 0.5–0.9)
DIFFERENTIAL TYPE: ABNORMAL
EOSINOPHILS RELATIVE PERCENT: 0 % (ref 1–4)
GFR AFRICAN AMERICAN: 51 ML/MIN
GFR NON-AFRICAN AMERICAN: 42 ML/MIN
GFR SERPL CREATININE-BSD FRML MDRD: ABNORMAL ML/MIN/{1.73_M2}
GFR SERPL CREATININE-BSD FRML MDRD: ABNORMAL ML/MIN/{1.73_M2}
HCT VFR BLD CALC: 33.4 % (ref 36.3–47.1)
HEMOGLOBIN: 10.8 G/DL (ref 11.9–15.1)
IMMATURE GRANULOCYTES: 1 %
LYMPHOCYTES # BLD: 24 % (ref 24–44)
MAGNESIUM: 2 MG/DL (ref 1.6–2.6)
MCH RBC QN AUTO: 29.5 PG (ref 25.2–33.5)
MCHC RBC AUTO-ENTMCNC: 32.3 G/DL (ref 28.4–34.8)
MCV RBC AUTO: 91.3 FL (ref 82.6–102.9)
MONOCYTES # BLD: 8 % (ref 1–7)
MORPHOLOGY: NORMAL
NRBC AUTOMATED: 0 PER 100 WBC
PDW BLD-RTO: 12.7 % (ref 11.8–14.4)
PHOSPHORUS: 3.2 MG/DL (ref 2.6–4.5)
PLATELET # BLD: 241 K/UL (ref 138–453)
PLATELET ESTIMATE: ABNORMAL
PMV BLD AUTO: 11.8 FL (ref 8.1–13.5)
POTASSIUM SERPL-SCNC: 4.2 MMOL/L (ref 3.7–5.3)
RBC # BLD: 3.66 M/UL (ref 3.95–5.11)
RBC # BLD: ABNORMAL 10*6/UL
SEG NEUTROPHILS: 67 % (ref 36–66)
SEGMENTED NEUTROPHILS ABSOLUTE COUNT: 10.85 K/UL (ref 1.8–7.7)
SODIUM BLD-SCNC: 138 MMOL/L (ref 135–144)
VITAMIN D 25-HYDROXY: 43.9 NG/ML (ref 30–100)
WBC # BLD: 16.2 K/UL (ref 3.5–11.3)
WBC # BLD: ABNORMAL 10*3/UL

## 2019-08-19 ENCOUNTER — HOSPITAL ENCOUNTER (OUTPATIENT)
Age: 69
Setting detail: SPECIMEN
Discharge: HOME | End: 2019-08-19
Payer: MEDICARE

## 2019-08-19 DIAGNOSIS — N18.30 BENIGN HYPERTENSION WITH CKD (CHRONIC KIDNEY DISEASE) STAGE III (HCC): ICD-10-CM

## 2019-08-19 DIAGNOSIS — N18.30 CKD (CHRONIC KIDNEY DISEASE) STAGE 3, GFR 30-59 ML/MIN (HCC): ICD-10-CM

## 2019-08-19 DIAGNOSIS — N18.30 SECONDARY DIABETES MELLITUS WITH STAGE 3 CHRONIC KIDNEY DISEASE (HCC): ICD-10-CM

## 2019-08-19 DIAGNOSIS — I12.9 BENIGN HYPERTENSION WITH CKD (CHRONIC KIDNEY DISEASE) STAGE III (HCC): ICD-10-CM

## 2019-08-19 DIAGNOSIS — E13.22 SECONDARY DIABETES MELLITUS WITH STAGE 3 CHRONIC KIDNEY DISEASE (HCC): ICD-10-CM

## 2019-08-19 LAB
ANION GAP SERPL CALCULATED.3IONS-SCNC: 13 MMOL/L (ref 9–17)
BUN BLDV-MCNC: 30 MG/DL (ref 8–23)
CHLORIDE BLD-SCNC: 101 MMOL/L (ref 98–107)
CO2: 22 MMOL/L (ref 20–31)
CREAT SERPL-MCNC: 1.19 MG/DL (ref 0.5–0.9)
GFR AFRICAN AMERICAN: 54 ML/MIN
GFR NON-AFRICAN AMERICAN: 45 ML/MIN
GFR SERPL CREATININE-BSD FRML MDRD: ABNORMAL ML/MIN/{1.73_M2}
GFR SERPL CREATININE-BSD FRML MDRD: ABNORMAL ML/MIN/{1.73_M2}
POTASSIUM SERPL-SCNC: 4.5 MMOL/L (ref 3.7–5.3)
SODIUM BLD-SCNC: 136 MMOL/L (ref 135–144)

## 2019-08-19 PROCEDURE — 84520 ASSAY OF UREA NITROGEN: CPT

## 2019-08-19 PROCEDURE — 36415 COLL VENOUS BLD VENIPUNCTURE: CPT

## 2019-08-19 PROCEDURE — 82565 ASSAY OF CREATININE: CPT

## 2019-08-19 PROCEDURE — 80051 ELECTROLYTE PANEL: CPT

## 2020-01-17 ENCOUNTER — HOSPITAL ENCOUNTER (OUTPATIENT)
Age: 70
Setting detail: SPECIMEN
Discharge: HOME | End: 2020-01-17
Payer: MEDICARE

## 2020-01-17 LAB
ABSOLUTE EOS #: 0.17 K/UL (ref 0–0.44)
ABSOLUTE IMMATURE GRANULOCYTE: 0.03 K/UL (ref 0–0.3)
ABSOLUTE LYMPH #: 1.73 K/UL (ref 1.1–3.7)
ABSOLUTE MONO #: 0.55 K/UL (ref 0.1–1.2)
ANION GAP SERPL CALCULATED.3IONS-SCNC: 18 MMOL/L (ref 9–17)
BASOPHILS # BLD: 1 % (ref 0–2)
BASOPHILS ABSOLUTE: 0.05 K/UL (ref 0–0.2)
BUN BLDV-MCNC: 35 MG/DL (ref 8–23)
CALCIUM SERPL-MCNC: 9.5 MG/DL (ref 8.6–10.4)
CHLORIDE BLD-SCNC: 105 MMOL/L (ref 98–107)
CO2: 19 MMOL/L (ref 20–31)
CREAT SERPL-MCNC: 1.2 MG/DL (ref 0.5–0.9)
DIFFERENTIAL TYPE: NORMAL
EOSINOPHILS RELATIVE PERCENT: 2 % (ref 1–4)
GFR AFRICAN AMERICAN: 54 ML/MIN
GFR NON-AFRICAN AMERICAN: 44 ML/MIN
GFR SERPL CREATININE-BSD FRML MDRD: ABNORMAL ML/MIN/{1.73_M2}
GFR SERPL CREATININE-BSD FRML MDRD: ABNORMAL ML/MIN/{1.73_M2}
HCT VFR BLD CALC: 37.3 % (ref 36.3–47.1)
HEMOGLOBIN: 12.1 G/DL (ref 11.9–15.1)
IMMATURE GRANULOCYTES: 0 %
LYMPHOCYTES # BLD: 24 % (ref 24–43)
MAGNESIUM: 2.2 MG/DL (ref 1.6–2.6)
MCH RBC QN AUTO: 30 PG (ref 25.2–33.5)
MCHC RBC AUTO-ENTMCNC: 32.4 G/DL (ref 28.4–34.8)
MCV RBC AUTO: 92.3 FL (ref 82.6–102.9)
MONOCYTES # BLD: 8 % (ref 3–12)
NRBC AUTOMATED: 0 PER 100 WBC
PDW BLD-RTO: 13.1 % (ref 11.8–14.4)
PHOSPHORUS: 4.2 MG/DL (ref 2.6–4.5)
PLATELET # BLD: 222 K/UL (ref 138–453)
PLATELET ESTIMATE: NORMAL
PMV BLD AUTO: 12.8 FL (ref 8.1–13.5)
POTASSIUM SERPL-SCNC: 5 MMOL/L (ref 3.7–5.3)
RBC # BLD: 4.04 M/UL (ref 3.95–5.11)
RBC # BLD: NORMAL 10*6/UL
SEG NEUTROPHILS: 65 % (ref 36–65)
SEGMENTED NEUTROPHILS ABSOLUTE COUNT: 4.63 K/UL (ref 1.5–8.1)
SODIUM BLD-SCNC: 142 MMOL/L (ref 135–144)
WBC # BLD: 7.2 K/UL (ref 3.5–11.3)
WBC # BLD: NORMAL 10*3/UL

## 2020-01-24 PROBLEM — E04.1 NONTOXIC SINGLE THYROID NODULE: Status: ACTIVE | Noted: 2020-01-24

## 2020-01-24 PROBLEM — E78.2 MIXED HYPERLIPIDEMIA: Status: ACTIVE | Noted: 2020-01-24

## 2020-07-31 ENCOUNTER — HOSPITAL ENCOUNTER (OUTPATIENT)
Age: 70
Setting detail: SPECIMEN
Discharge: HOME | End: 2020-07-31
Payer: MEDICARE

## 2020-07-31 LAB
-: ABNORMAL
ABSOLUTE EOS #: 0.3 K/UL (ref 0–0.44)
ABSOLUTE IMMATURE GRANULOCYTE: <0.03 K/UL (ref 0–0.3)
ABSOLUTE LYMPH #: 1.57 K/UL (ref 1.1–3.7)
ABSOLUTE MONO #: 0.51 K/UL (ref 0.1–1.2)
AMORPHOUS: ABNORMAL
ANION GAP SERPL CALCULATED.3IONS-SCNC: 13 MMOL/L (ref 9–17)
BACTERIA: ABNORMAL
BASOPHILS # BLD: 1 % (ref 0–2)
BASOPHILS ABSOLUTE: 0.07 K/UL (ref 0–0.2)
BILIRUBIN URINE: NEGATIVE
BUN BLDV-MCNC: 42 MG/DL (ref 8–23)
CALCIUM SERPL-MCNC: 9.4 MG/DL (ref 8.6–10.4)
CASTS UA: ABNORMAL /LPF (ref 0–8)
CHLORIDE BLD-SCNC: 104 MMOL/L (ref 98–107)
CO2: 21 MMOL/L (ref 20–31)
COLOR: YELLOW
CREAT SERPL-MCNC: 1.21 MG/DL (ref 0.5–0.9)
CREATININE URINE: 91.7 MG/DL (ref 28–217)
CRYSTALS, UA: ABNORMAL /HPF
DIFFERENTIAL TYPE: ABNORMAL
EOSINOPHILS RELATIVE PERCENT: 4 % (ref 1–4)
EPITHELIAL CELLS UA: ABNORMAL /HPF (ref 0–5)
GFR AFRICAN AMERICAN: 53 ML/MIN
GFR NON-AFRICAN AMERICAN: 44 ML/MIN
GFR SERPL CREATININE-BSD FRML MDRD: ABNORMAL ML/MIN/{1.73_M2}
GFR SERPL CREATININE-BSD FRML MDRD: ABNORMAL ML/MIN/{1.73_M2}
GLUCOSE URINE: NEGATIVE
HCT VFR BLD CALC: 36.2 % (ref 36.3–47.1)
HEMOGLOBIN: 11.7 G/DL (ref 11.9–15.1)
IMMATURE GRANULOCYTES: 0 %
KETONES, URINE: NEGATIVE
LEUKOCYTE ESTERASE, URINE: NEGATIVE
LYMPHOCYTES # BLD: 22 % (ref 24–43)
MAGNESIUM: 2.1 MG/DL (ref 1.6–2.6)
MCH RBC QN AUTO: 29.9 PG (ref 25.2–33.5)
MCHC RBC AUTO-ENTMCNC: 32.3 G/DL (ref 28.4–34.8)
MCV RBC AUTO: 92.6 FL (ref 82.6–102.9)
MICROALBUMIN/CREAT 24H UR: 93 MG/L
MICROALBUMIN/CREAT UR-RTO: 101 MCG/MG CREAT
MONOCYTES # BLD: 7 % (ref 3–12)
MUCUS: ABNORMAL
NITRITE, URINE: NEGATIVE
NRBC AUTOMATED: 0 PER 100 WBC
OTHER OBSERVATIONS UA: ABNORMAL
PDW BLD-RTO: 13.6 % (ref 11.8–14.4)
PH UA: 5 (ref 5–8)
PHOSPHORUS: 4.5 MG/DL (ref 2.6–4.5)
PLATELET # BLD: 225 K/UL (ref 138–453)
PLATELET ESTIMATE: ABNORMAL
PMV BLD AUTO: 12.7 FL (ref 8.1–13.5)
POTASSIUM SERPL-SCNC: 5.3 MMOL/L (ref 3.7–5.3)
PROTEIN UA: ABNORMAL
RBC # BLD: 3.91 M/UL (ref 3.95–5.11)
RBC # BLD: ABNORMAL 10*6/UL
RBC UA: ABNORMAL /HPF (ref 0–4)
RENAL EPITHELIAL, UA: ABNORMAL /HPF
SEG NEUTROPHILS: 66 % (ref 36–65)
SEGMENTED NEUTROPHILS ABSOLUTE COUNT: 4.75 K/UL (ref 1.5–8.1)
SODIUM BLD-SCNC: 138 MMOL/L (ref 135–144)
SPECIFIC GRAVITY UA: 1.01 (ref 1–1.03)
TRICHOMONAS: ABNORMAL
TURBIDITY: CLEAR
URINE HGB: NEGATIVE
UROBILINOGEN, URINE: NORMAL
VITAMIN D 25-HYDROXY: 31 NG/ML (ref 30–100)
WBC # BLD: 7.2 K/UL (ref 3.5–11.3)
WBC # BLD: ABNORMAL 10*3/UL
WBC UA: ABNORMAL /HPF (ref 0–5)
YEAST: ABNORMAL

## 2021-02-01 ENCOUNTER — HOSPITAL ENCOUNTER (OUTPATIENT)
Age: 71
Setting detail: SPECIMEN
Discharge: HOME | End: 2021-02-01
Payer: MEDICARE

## 2021-02-01 DIAGNOSIS — N18.30 BENIGN HYPERTENSION WITH CKD (CHRONIC KIDNEY DISEASE) STAGE III (HCC): ICD-10-CM

## 2021-02-01 DIAGNOSIS — E13.22 SECONDARY DIABETES MELLITUS WITH STAGE 3 CHRONIC KIDNEY DISEASE (HCC): ICD-10-CM

## 2021-02-01 DIAGNOSIS — N18.30 CKD (CHRONIC KIDNEY DISEASE) STAGE 3, GFR 30-59 ML/MIN (HCC): ICD-10-CM

## 2021-02-01 DIAGNOSIS — I12.9 BENIGN HYPERTENSION WITH CKD (CHRONIC KIDNEY DISEASE) STAGE III (HCC): ICD-10-CM

## 2021-02-01 DIAGNOSIS — N18.30 SECONDARY DIABETES MELLITUS WITH STAGE 3 CHRONIC KIDNEY DISEASE (HCC): ICD-10-CM

## 2021-02-01 LAB
-: NORMAL
ABSOLUTE EOS #: 0.29 K/UL (ref 0–0.44)
ABSOLUTE IMMATURE GRANULOCYTE: 0.03 K/UL (ref 0–0.3)
ABSOLUTE LYMPH #: 1.7 K/UL (ref 1.1–3.7)
ABSOLUTE MONO #: 0.68 K/UL (ref 0.1–1.2)
AMORPHOUS: NORMAL
ANION GAP SERPL CALCULATED.3IONS-SCNC: 13 MMOL/L (ref 9–17)
BACTERIA: NORMAL
BASOPHILS # BLD: 1 % (ref 0–2)
BASOPHILS ABSOLUTE: 0.05 K/UL (ref 0–0.2)
BILIRUBIN URINE: NEGATIVE
BUN BLDV-MCNC: 34 MG/DL (ref 8–23)
CALCIUM SERPL-MCNC: 9.5 MG/DL (ref 8.6–10.4)
CASTS UA: NORMAL /LPF (ref 0–8)
CHLORIDE BLD-SCNC: 108 MMOL/L (ref 98–107)
CO2: 20 MMOL/L (ref 20–31)
COLOR: ABNORMAL
COMMENT UA: ABNORMAL
CREAT SERPL-MCNC: 1.28 MG/DL (ref 0.5–0.9)
CRYSTALS, UA: NORMAL /HPF
DIFFERENTIAL TYPE: ABNORMAL
EOSINOPHILS RELATIVE PERCENT: 4 % (ref 1–4)
EPITHELIAL CELLS UA: NORMAL /HPF (ref 0–5)
GFR AFRICAN AMERICAN: 50 ML/MIN
GFR NON-AFRICAN AMERICAN: 41 ML/MIN
GFR SERPL CREATININE-BSD FRML MDRD: ABNORMAL ML/MIN/{1.73_M2}
GFR SERPL CREATININE-BSD FRML MDRD: ABNORMAL ML/MIN/{1.73_M2}
GLUCOSE URINE: NEGATIVE
HCT VFR BLD CALC: 35.1 % (ref 36.3–47.1)
HEMOGLOBIN: 11 G/DL (ref 11.9–15.1)
IMMATURE GRANULOCYTES: 0 %
KETONES, URINE: NEGATIVE
LEUKOCYTE ESTERASE, URINE: ABNORMAL
LYMPHOCYTES # BLD: 21 % (ref 24–43)
MAGNESIUM: 1.9 MG/DL (ref 1.6–2.6)
MCH RBC QN AUTO: 29.3 PG (ref 25.2–33.5)
MCHC RBC AUTO-ENTMCNC: 31.3 G/DL (ref 28.4–34.8)
MCV RBC AUTO: 93.6 FL (ref 82.6–102.9)
MONOCYTES # BLD: 8 % (ref 3–12)
MUCUS: NORMAL
NITRITE, URINE: NEGATIVE
NRBC AUTOMATED: 0 PER 100 WBC
OTHER OBSERVATIONS UA: NORMAL
PDW BLD-RTO: 13.6 % (ref 11.8–14.4)
PH UA: 5.5 (ref 5–8)
PHOSPHORUS: 4.8 MG/DL (ref 2.6–4.5)
PLATELET # BLD: 239 K/UL (ref 138–453)
PLATELET ESTIMATE: ABNORMAL
PMV BLD AUTO: 12.6 FL (ref 8.1–13.5)
POTASSIUM SERPL-SCNC: 4.7 MMOL/L (ref 3.7–5.3)
PROTEIN UA: ABNORMAL
RBC # BLD: 3.75 M/UL (ref 3.95–5.11)
RBC # BLD: ABNORMAL 10*6/UL
RBC UA: NORMAL /HPF (ref 0–4)
RENAL EPITHELIAL, UA: NORMAL /HPF
SEG NEUTROPHILS: 66 % (ref 36–65)
SEGMENTED NEUTROPHILS ABSOLUTE COUNT: 5.4 K/UL (ref 1.5–8.1)
SODIUM BLD-SCNC: 141 MMOL/L (ref 135–144)
SPECIFIC GRAVITY UA: 1.01 (ref 1–1.03)
TRICHOMONAS: NORMAL
TURBIDITY: CLEAR
URINE HGB: NEGATIVE
UROBILINOGEN, URINE: NORMAL
VITAMIN D 25-HYDROXY: 32.8 NG/ML (ref 30–100)
WBC # BLD: 8.2 K/UL (ref 3.5–11.3)
WBC # BLD: ABNORMAL 10*3/UL
WBC UA: NORMAL /HPF (ref 0–5)
YEAST: NORMAL

## 2021-04-07 ENCOUNTER — HOSPITAL ENCOUNTER (OUTPATIENT)
Age: 71
Setting detail: SPECIMEN
Discharge: HOME | End: 2021-04-07
Payer: MEDICARE

## 2021-04-07 LAB
ALBUMIN SERPL-MCNC: 4.5 G/DL (ref 3.5–5.2)
ALBUMIN/GLOBULIN RATIO: 2 (ref 1–2.5)
ALP BLD-CCNC: 57 U/L (ref 35–104)
ALT SERPL-CCNC: 16 U/L (ref 5–33)
ANION GAP SERPL CALCULATED.3IONS-SCNC: 15 MMOL/L (ref 9–17)
AST SERPL-CCNC: 17 U/L
BILIRUB SERPL-MCNC: 0.32 MG/DL (ref 0.3–1.2)
BUN BLDV-MCNC: 35 MG/DL (ref 8–23)
BUN/CREAT BLD: ABNORMAL (ref 9–20)
CALCIUM SERPL-MCNC: 9 MG/DL (ref 8.6–10.4)
CHLORIDE BLD-SCNC: 108 MMOL/L (ref 98–107)
CHOLESTEROL, FASTING: 173 MG/DL
CHOLESTEROL/HDL RATIO: 2.3
CO2: 18 MMOL/L (ref 20–31)
CREAT SERPL-MCNC: 1.27 MG/DL (ref 0.5–0.9)
CREATININE URINE: 46.9 MG/DL (ref 28–217)
GFR AFRICAN AMERICAN: 50 ML/MIN
GFR NON-AFRICAN AMERICAN: 41 ML/MIN
GFR SERPL CREATININE-BSD FRML MDRD: ABNORMAL ML/MIN/{1.73_M2}
GFR SERPL CREATININE-BSD FRML MDRD: ABNORMAL ML/MIN/{1.73_M2}
GLUCOSE FASTING: 153 MG/DL (ref 70–99)
HDLC SERPL-MCNC: 76 MG/DL
LDL CHOLESTEROL: 84 MG/DL (ref 0–130)
MICROALBUMIN/CREAT 24H UR: 287 MG/L
MICROALBUMIN/CREAT UR-RTO: 612 MCG/MG CREAT
POTASSIUM SERPL-SCNC: 4.7 MMOL/L (ref 3.7–5.3)
SODIUM BLD-SCNC: 141 MMOL/L (ref 135–144)
TOTAL PROTEIN: 6.8 G/DL (ref 6.4–8.3)
TRIGLYCERIDE, FASTING: 67 MG/DL
VITAMIN B-12: 554 PG/ML (ref 232–1245)
VLDLC SERPL CALC-MCNC: NORMAL MG/DL (ref 1–30)

## 2021-06-14 ENCOUNTER — HOSPITAL ENCOUNTER (OUTPATIENT)
Age: 71
Setting detail: SPECIMEN
Discharge: HOME | End: 2021-06-14
Payer: MEDICARE

## 2021-06-14 DIAGNOSIS — N18.32 ANEMIA IN STAGE 3B CHRONIC KIDNEY DISEASE (HCC): ICD-10-CM

## 2021-06-14 DIAGNOSIS — R80.9 PROTEINURIA, UNSPECIFIED TYPE: ICD-10-CM

## 2021-06-14 DIAGNOSIS — D63.1 ANEMIA IN STAGE 3B CHRONIC KIDNEY DISEASE (HCC): ICD-10-CM

## 2021-06-14 DIAGNOSIS — I12.9 BENIGN HYPERTENSION WITH CKD (CHRONIC KIDNEY DISEASE) STAGE III (HCC): ICD-10-CM

## 2021-06-14 DIAGNOSIS — N18.30 BENIGN HYPERTENSION WITH CKD (CHRONIC KIDNEY DISEASE) STAGE III (HCC): ICD-10-CM

## 2021-06-14 DIAGNOSIS — E13.22 SECONDARY DIABETES MELLITUS WITH STAGE 3 CHRONIC KIDNEY DISEASE (HCC): ICD-10-CM

## 2021-06-14 DIAGNOSIS — N18.30 SECONDARY DIABETES MELLITUS WITH STAGE 3 CHRONIC KIDNEY DISEASE (HCC): ICD-10-CM

## 2021-06-14 DIAGNOSIS — N18.32 STAGE 3B CHRONIC KIDNEY DISEASE (HCC): ICD-10-CM

## 2021-06-14 DIAGNOSIS — E83.39 HYPERPHOSPHATEMIA: ICD-10-CM

## 2021-06-14 LAB
-: NORMAL
ABSOLUTE EOS #: 0.18 K/UL (ref 0–0.44)
ABSOLUTE IMMATURE GRANULOCYTE: <0.03 K/UL (ref 0–0.3)
ABSOLUTE LYMPH #: 1.5 K/UL (ref 1.1–3.7)
ABSOLUTE MONO #: 0.61 K/UL (ref 0.1–1.2)
AMORPHOUS: NORMAL
ANION GAP SERPL CALCULATED.3IONS-SCNC: 12 MMOL/L (ref 9–17)
BACTERIA: NORMAL
BASOPHILS # BLD: 1 % (ref 0–2)
BASOPHILS ABSOLUTE: 0.06 K/UL (ref 0–0.2)
BILIRUBIN URINE: NEGATIVE
BUN BLDV-MCNC: 45 MG/DL (ref 8–23)
CALCIUM SERPL-MCNC: 9.3 MG/DL (ref 8.6–10.4)
CASTS UA: NORMAL /LPF (ref 0–8)
CHLORIDE BLD-SCNC: 107 MMOL/L (ref 98–107)
CO2: 20 MMOL/L (ref 20–31)
COLOR: YELLOW
COMMENT UA: ABNORMAL
CREAT SERPL-MCNC: 1.46 MG/DL (ref 0.5–0.9)
CREATININE URINE: 49.6 MG/DL (ref 28–217)
CRYSTALS, UA: NORMAL /HPF
DIFFERENTIAL TYPE: ABNORMAL
EOSINOPHILS RELATIVE PERCENT: 2 % (ref 1–4)
EPITHELIAL CELLS UA: NORMAL /HPF (ref 0–5)
GFR AFRICAN AMERICAN: 43 ML/MIN
GFR NON-AFRICAN AMERICAN: 35 ML/MIN
GFR SERPL CREATININE-BSD FRML MDRD: ABNORMAL ML/MIN/{1.73_M2}
GFR SERPL CREATININE-BSD FRML MDRD: ABNORMAL ML/MIN/{1.73_M2}
GLUCOSE URINE: NEGATIVE
HCT VFR BLD CALC: 35.8 % (ref 36.3–47.1)
HEMOGLOBIN: 11.4 G/DL (ref 11.9–15.1)
IMMATURE GRANULOCYTES: 0 %
KETONES, URINE: NEGATIVE
LEUKOCYTE ESTERASE, URINE: NEGATIVE
LYMPHOCYTES # BLD: 20 % (ref 24–43)
MAGNESIUM: 3 MG/DL (ref 1.6–2.6)
MCH RBC QN AUTO: 29.9 PG (ref 25.2–33.5)
MCHC RBC AUTO-ENTMCNC: 31.8 G/DL (ref 28.4–34.8)
MCV RBC AUTO: 94 FL (ref 82.6–102.9)
MICROALBUMIN/CREAT 24H UR: 94 MG/L
MICROALBUMIN/CREAT UR-RTO: 190 MCG/MG CREAT
MONOCYTES # BLD: 8 % (ref 3–12)
MUCUS: NORMAL
NITRITE, URINE: NEGATIVE
NRBC AUTOMATED: 0 PER 100 WBC
OTHER OBSERVATIONS UA: NORMAL
PDW BLD-RTO: 13.4 % (ref 11.8–14.4)
PH UA: 5.5 (ref 5–8)
PHOSPHORUS: 5.1 MG/DL (ref 2.6–4.5)
PLATELET # BLD: ABNORMAL K/UL (ref 138–453)
PLATELET ESTIMATE: ABNORMAL
PLATELET, FLUORESCENCE: 206 K/UL (ref 138–453)
PLATELET, IMMATURE FRACTION: 5.8 % (ref 1.1–10.3)
PMV BLD AUTO: ABNORMAL FL (ref 8.1–13.5)
POTASSIUM SERPL-SCNC: 5.4 MMOL/L (ref 3.7–5.3)
PROTEIN UA: ABNORMAL
RBC # BLD: 3.81 M/UL (ref 3.95–5.11)
RBC # BLD: ABNORMAL 10*6/UL
RBC UA: NORMAL /HPF (ref 0–4)
RENAL EPITHELIAL, UA: NORMAL /HPF
SEG NEUTROPHILS: 69 % (ref 36–65)
SEGMENTED NEUTROPHILS ABSOLUTE COUNT: 5.25 K/UL (ref 1.5–8.1)
SODIUM BLD-SCNC: 139 MMOL/L (ref 135–144)
SPECIFIC GRAVITY UA: 1.01 (ref 1–1.03)
TRICHOMONAS: NORMAL
TURBIDITY: CLEAR
URINE HGB: NEGATIVE
UROBILINOGEN, URINE: NORMAL
VITAMIN D 25-HYDROXY: 48.2 NG/ML (ref 30–100)
WBC # BLD: 7.6 K/UL (ref 3.5–11.3)
WBC # BLD: ABNORMAL 10*3/UL
WBC UA: NORMAL /HPF (ref 0–5)
YEAST: NORMAL

## 2021-06-21 ENCOUNTER — HOSPITAL ENCOUNTER (OUTPATIENT)
Age: 71
Setting detail: SPECIMEN
Discharge: HOME | End: 2021-06-21
Payer: MEDICARE

## 2021-06-21 DIAGNOSIS — N18.32 ANEMIA IN STAGE 3B CHRONIC KIDNEY DISEASE (HCC): ICD-10-CM

## 2021-06-21 DIAGNOSIS — D63.1 ANEMIA IN STAGE 3B CHRONIC KIDNEY DISEASE (HCC): ICD-10-CM

## 2021-06-21 DIAGNOSIS — N18.32 STAGE 3B CHRONIC KIDNEY DISEASE (HCC): ICD-10-CM

## 2021-06-21 DIAGNOSIS — N18.30 SECONDARY DIABETES MELLITUS WITH STAGE 3 CHRONIC KIDNEY DISEASE (HCC): ICD-10-CM

## 2021-06-21 DIAGNOSIS — I12.9 BENIGN HYPERTENSION WITH CKD (CHRONIC KIDNEY DISEASE) STAGE III (HCC): ICD-10-CM

## 2021-06-21 DIAGNOSIS — E13.22 SECONDARY DIABETES MELLITUS WITH STAGE 3 CHRONIC KIDNEY DISEASE (HCC): ICD-10-CM

## 2021-06-21 DIAGNOSIS — N18.30 BENIGN HYPERTENSION WITH CKD (CHRONIC KIDNEY DISEASE) STAGE III (HCC): ICD-10-CM

## 2021-06-21 LAB — POTASSIUM SERPL-SCNC: 4.9 MMOL/L (ref 3.7–5.3)

## 2021-07-03 ENCOUNTER — APPOINTMENT (OUTPATIENT)
Dept: CT IMAGING | Age: 71
DRG: 193 | End: 2021-07-03
Payer: MEDICARE

## 2021-07-03 ENCOUNTER — APPOINTMENT (OUTPATIENT)
Dept: GENERAL RADIOLOGY | Age: 71
DRG: 193 | End: 2021-07-03
Payer: MEDICARE

## 2021-07-03 ENCOUNTER — HOSPITAL ENCOUNTER (INPATIENT)
Age: 71
LOS: 7 days | Discharge: HOME | DRG: 193 | End: 2021-07-10
Attending: EMERGENCY MEDICINE | Admitting: FAMILY MEDICINE
Payer: MEDICARE

## 2021-07-03 DIAGNOSIS — N30.01 ACUTE CYSTITIS WITH HEMATURIA: ICD-10-CM

## 2021-07-03 DIAGNOSIS — N17.9 AKI (ACUTE KIDNEY INJURY) (HCC): ICD-10-CM

## 2021-07-03 DIAGNOSIS — J18.9 PNEUMONIA OF LEFT LUNG DUE TO INFECTIOUS ORGANISM, UNSPECIFIED PART OF LUNG: Primary | ICD-10-CM

## 2021-07-03 DIAGNOSIS — J90 PLEURAL EFFUSION: ICD-10-CM

## 2021-07-03 LAB
-: ABNORMAL
ABSOLUTE EOS #: <0.03 K/UL (ref 0–0.44)
ABSOLUTE IMMATURE GRANULOCYTE: 0.1 K/UL (ref 0–0.3)
ABSOLUTE LYMPH #: 0.75 K/UL (ref 1.1–3.7)
ABSOLUTE MONO #: 1.31 K/UL (ref 0.1–1.2)
ALBUMIN SERPL-MCNC: 3.7 G/DL (ref 3.5–5.2)
ALBUMIN/GLOBULIN RATIO: ABNORMAL (ref 1–2.5)
ALP BLD-CCNC: 78 U/L (ref 35–104)
ALT SERPL-CCNC: 30 U/L (ref 5–33)
AMORPHOUS: ABNORMAL
ANION GAP SERPL CALCULATED.3IONS-SCNC: 22 MMOL/L (ref 9–17)
AST SERPL-CCNC: 37 U/L
BACTERIA: ABNORMAL
BASOPHILS # BLD: 0 % (ref 0–2)
BASOPHILS ABSOLUTE: 0.04 K/UL (ref 0–0.2)
BILIRUB SERPL-MCNC: 0.38 MG/DL (ref 0.3–1.2)
BILIRUBIN URINE: NEGATIVE
BNP INTERPRETATION: ABNORMAL
BUN BLDV-MCNC: 67 MG/DL (ref 8–23)
BUN/CREAT BLD: 23 (ref 9–20)
CALCIUM SERPL-MCNC: 9.3 MG/DL (ref 8.6–10.4)
CASTS UA: ABNORMAL /LPF
CASTS UA: ABNORMAL /LPF
CHLORIDE BLD-SCNC: 97 MMOL/L (ref 98–107)
CO2: 13 MMOL/L (ref 20–31)
COLOR: YELLOW
COMMENT UA: ABNORMAL
CREAT SERPL-MCNC: 2.95 MG/DL (ref 0.5–0.9)
CREATININE URINE: 132.2 MG/DL (ref 28–217)
CRYSTALS, UA: ABNORMAL /HPF
DIFFERENTIAL TYPE: ABNORMAL
EOSINOPHILS RELATIVE PERCENT: 0 % (ref 1–4)
EPITHELIAL CELLS UA: ABNORMAL /HPF (ref 0–5)
GFR AFRICAN AMERICAN: 19 ML/MIN
GFR NON-AFRICAN AMERICAN: 16 ML/MIN
GFR SERPL CREATININE-BSD FRML MDRD: ABNORMAL ML/MIN/{1.73_M2}
GFR SERPL CREATININE-BSD FRML MDRD: ABNORMAL ML/MIN/{1.73_M2}
GLUCOSE BLD-MCNC: 275 MG/DL (ref 70–99)
GLUCOSE BLD-MCNC: 309 MG/DL (ref 65–105)
GLUCOSE URINE: NEGATIVE
HCT VFR BLD CALC: 30.6 % (ref 36.3–47.1)
HCT VFR BLD CALC: 36.4 % (ref 36.3–47.1)
HEMOGLOBIN: 10.1 G/DL (ref 11.9–15.1)
HEMOGLOBIN: 11.9 G/DL (ref 11.9–15.1)
IMMATURE GRANULOCYTES: 1 %
INR BLD: 1.2
KETONES, URINE: ABNORMAL
LACTIC ACID: 1.9 MMOL/L (ref 0.5–2.2)
LACTIC ACID: 1.9 MMOL/L (ref 0.5–2.2)
LEUKOCYTE ESTERASE, URINE: ABNORMAL
LIPASE: 10 U/L (ref 13–60)
LYMPHOCYTES # BLD: 5 % (ref 24–43)
MAGNESIUM: 2 MG/DL (ref 1.6–2.6)
MCH RBC QN AUTO: 29.9 PG (ref 25.2–33.5)
MCH RBC QN AUTO: 30 PG (ref 25.2–33.5)
MCHC RBC AUTO-ENTMCNC: 32.7 G/DL (ref 28.4–34.8)
MCHC RBC AUTO-ENTMCNC: 33 G/DL (ref 28.4–34.8)
MCV RBC AUTO: 90.5 FL (ref 82.6–102.9)
MCV RBC AUTO: 91.7 FL (ref 82.6–102.9)
MONOCYTES # BLD: 9 % (ref 3–12)
MUCUS: ABNORMAL
NITRITE, URINE: POSITIVE
NRBC AUTOMATED: 0 PER 100 WBC
NRBC AUTOMATED: 0 PER 100 WBC
OTHER OBSERVATIONS UA: ABNORMAL
PARTIAL THROMBOPLASTIN TIME: 31 SEC (ref 23.9–33.8)
PDW BLD-RTO: 13.2 % (ref 11.8–14.4)
PDW BLD-RTO: 13.2 % (ref 11.8–14.4)
PH UA: 5.5 (ref 5–8)
PLATELET # BLD: 206 K/UL (ref 138–453)
PLATELET # BLD: 230 K/UL (ref 138–453)
PLATELET ESTIMATE: ABNORMAL
PMV BLD AUTO: 11.4 FL (ref 8.1–13.5)
PMV BLD AUTO: 11.4 FL (ref 8.1–13.5)
POTASSIUM SERPL-SCNC: 3.8 MMOL/L (ref 3.7–5.3)
PRO-BNP: ABNORMAL PG/ML
PROCALCITONIN: 1.86 NG/ML
PROTEIN UA: ABNORMAL
PROTHROMBIN TIME: 14.6 SEC (ref 11.5–14.2)
RBC # BLD: 3.38 M/UL (ref 3.95–5.11)
RBC # BLD: 3.97 M/UL (ref 3.95–5.11)
RBC # BLD: ABNORMAL 10*6/UL
RBC UA: ABNORMAL /HPF (ref 0–2)
RENAL EPITHELIAL, UA: ABNORMAL /HPF
SEG NEUTROPHILS: 85 % (ref 36–65)
SEGMENTED NEUTROPHILS ABSOLUTE COUNT: 12.38 K/UL (ref 1.5–8.1)
SODIUM BLD-SCNC: 132 MMOL/L (ref 135–144)
SODIUM,UR: 31 MMOL/L
SPECIFIC GRAVITY UA: 1.02 (ref 1–1.03)
TOTAL CK: 189 U/L (ref 26–192)
TOTAL PROTEIN: 7 G/DL (ref 6.4–8.3)
TRICHOMONAS: ABNORMAL
TROPONIN INTERP: ABNORMAL
TROPONIN T: ABNORMAL NG/ML
TROPONIN, HIGH SENSITIVITY: 47 NG/L (ref 0–14)
TURBIDITY: ABNORMAL
URINE HGB: ABNORMAL
UROBILINOGEN, URINE: NORMAL
WBC # BLD: 12.5 K/UL (ref 3.5–11.3)
WBC # BLD: 14.6 K/UL (ref 3.5–11.3)
WBC # BLD: ABNORMAL 10*3/UL
WBC UA: ABNORMAL /HPF (ref 0–5)
YEAST: ABNORMAL

## 2021-07-03 PROCEDURE — 83880 ASSAY OF NATRIURETIC PEPTIDE: CPT

## 2021-07-03 PROCEDURE — 2500000003 HC RX 250 WO HCPCS: Performed by: EMERGENCY MEDICINE

## 2021-07-03 PROCEDURE — 84484 ASSAY OF TROPONIN QUANT: CPT

## 2021-07-03 PROCEDURE — 85610 PROTHROMBIN TIME: CPT

## 2021-07-03 PROCEDURE — 84145 PROCALCITONIN (PCT): CPT

## 2021-07-03 PROCEDURE — 2580000003 HC RX 258: Performed by: FAMILY MEDICINE

## 2021-07-03 PROCEDURE — 85027 COMPLETE CBC AUTOMATED: CPT

## 2021-07-03 PROCEDURE — 83605 ASSAY OF LACTIC ACID: CPT

## 2021-07-03 PROCEDURE — 82550 ASSAY OF CK (CPK): CPT

## 2021-07-03 PROCEDURE — 85730 THROMBOPLASTIN TIME PARTIAL: CPT

## 2021-07-03 PROCEDURE — 83735 ASSAY OF MAGNESIUM: CPT

## 2021-07-03 PROCEDURE — 80053 COMPREHEN METABOLIC PANEL: CPT

## 2021-07-03 PROCEDURE — 51798 US URINE CAPACITY MEASURE: CPT

## 2021-07-03 PROCEDURE — 2060000000 HC ICU INTERMEDIATE R&B

## 2021-07-03 PROCEDURE — 74176 CT ABD & PELVIS W/O CONTRAST: CPT

## 2021-07-03 PROCEDURE — 96375 TX/PRO/DX INJ NEW DRUG ADDON: CPT

## 2021-07-03 PROCEDURE — 6370000000 HC RX 637 (ALT 250 FOR IP): Performed by: FAMILY MEDICINE

## 2021-07-03 PROCEDURE — 96367 TX/PROPH/DG ADDL SEQ IV INF: CPT

## 2021-07-03 PROCEDURE — 99283 EMERGENCY DEPT VISIT LOW MDM: CPT

## 2021-07-03 PROCEDURE — 81001 URINALYSIS AUTO W/SCOPE: CPT

## 2021-07-03 PROCEDURE — 82570 ASSAY OF URINE CREATININE: CPT

## 2021-07-03 PROCEDURE — 6360000002 HC RX W HCPCS: Performed by: EMERGENCY MEDICINE

## 2021-07-03 PROCEDURE — 2580000003 HC RX 258: Performed by: EMERGENCY MEDICINE

## 2021-07-03 PROCEDURE — 96374 THER/PROPH/DIAG INJ IV PUSH: CPT

## 2021-07-03 PROCEDURE — 96365 THER/PROPH/DIAG IV INF INIT: CPT

## 2021-07-03 PROCEDURE — 84300 ASSAY OF URINE SODIUM: CPT

## 2021-07-03 PROCEDURE — 85025 COMPLETE CBC W/AUTO DIFF WBC: CPT

## 2021-07-03 PROCEDURE — 6360000002 HC RX W HCPCS: Performed by: FAMILY MEDICINE

## 2021-07-03 PROCEDURE — 82947 ASSAY GLUCOSE BLOOD QUANT: CPT

## 2021-07-03 PROCEDURE — P9612 CATHETERIZE FOR URINE SPEC: HCPCS

## 2021-07-03 PROCEDURE — 36415 COLL VENOUS BLD VENIPUNCTURE: CPT

## 2021-07-03 PROCEDURE — 71045 X-RAY EXAM CHEST 1 VIEW: CPT

## 2021-07-03 PROCEDURE — 87040 BLOOD CULTURE FOR BACTERIA: CPT

## 2021-07-03 PROCEDURE — 83690 ASSAY OF LIPASE: CPT

## 2021-07-03 RX ORDER — ACETAMINOPHEN 650 MG/1
650 SUPPOSITORY RECTAL EVERY 6 HOURS PRN
Status: DISCONTINUED | OUTPATIENT
Start: 2021-07-03 | End: 2021-07-10 | Stop reason: HOSPADM

## 2021-07-03 RX ORDER — SODIUM CHLORIDE 9 MG/ML
INJECTION, SOLUTION INTRAVENOUS CONTINUOUS
Status: DISCONTINUED | OUTPATIENT
Start: 2021-07-03 | End: 2021-07-03

## 2021-07-03 RX ORDER — DEXTROSE MONOHYDRATE 25 G/50ML
12.5 INJECTION, SOLUTION INTRAVENOUS PRN
Status: DISCONTINUED | OUTPATIENT
Start: 2021-07-03 | End: 2021-07-10 | Stop reason: HOSPADM

## 2021-07-03 RX ORDER — SODIUM CHLORIDE 9 MG/ML
25 INJECTION, SOLUTION INTRAVENOUS PRN
Status: DISCONTINUED | OUTPATIENT
Start: 2021-07-03 | End: 2021-07-10 | Stop reason: HOSPADM

## 2021-07-03 RX ORDER — NICOTINE POLACRILEX 4 MG
15 LOZENGE BUCCAL PRN
Status: DISCONTINUED | OUTPATIENT
Start: 2021-07-03 | End: 2021-07-10 | Stop reason: HOSPADM

## 2021-07-03 RX ORDER — POTASSIUM CHLORIDE 7.45 MG/ML
10 INJECTION INTRAVENOUS PRN
Status: DISCONTINUED | OUTPATIENT
Start: 2021-07-03 | End: 2021-07-03 | Stop reason: SINTOL

## 2021-07-03 RX ORDER — HEPARIN SODIUM 1000 [USP'U]/ML
40 INJECTION, SOLUTION INTRAVENOUS; SUBCUTANEOUS PRN
Status: DISCONTINUED | OUTPATIENT
Start: 2021-07-03 | End: 2021-07-06

## 2021-07-03 RX ORDER — ONDANSETRON 2 MG/ML
4 INJECTION INTRAMUSCULAR; INTRAVENOUS EVERY 6 HOURS PRN
Status: DISCONTINUED | OUTPATIENT
Start: 2021-07-03 | End: 2021-07-10 | Stop reason: HOSPADM

## 2021-07-03 RX ORDER — DEXTROSE MONOHYDRATE 50 MG/ML
100 INJECTION, SOLUTION INTRAVENOUS PRN
Status: DISCONTINUED | OUTPATIENT
Start: 2021-07-03 | End: 2021-07-10 | Stop reason: HOSPADM

## 2021-07-03 RX ORDER — HEPARIN SODIUM 1000 [USP'U]/ML
80 INJECTION, SOLUTION INTRAVENOUS; SUBCUTANEOUS ONCE
Status: COMPLETED | OUTPATIENT
Start: 2021-07-03 | End: 2021-07-03

## 2021-07-03 RX ORDER — HEPARIN SODIUM 1000 [USP'U]/ML
80 INJECTION, SOLUTION INTRAVENOUS; SUBCUTANEOUS PRN
Status: DISCONTINUED | OUTPATIENT
Start: 2021-07-03 | End: 2021-07-06

## 2021-07-03 RX ORDER — POLYETHYLENE GLYCOL 3350 17 G/17G
17 POWDER, FOR SOLUTION ORAL DAILY PRN
Status: DISCONTINUED | OUTPATIENT
Start: 2021-07-03 | End: 2021-07-10 | Stop reason: HOSPADM

## 2021-07-03 RX ORDER — SODIUM CHLORIDE 0.9 % (FLUSH) 0.9 %
10 SYRINGE (ML) INJECTION PRN
Status: DISCONTINUED | OUTPATIENT
Start: 2021-07-03 | End: 2021-07-10 | Stop reason: HOSPADM

## 2021-07-03 RX ORDER — HEPARIN SODIUM 10000 [USP'U]/100ML
5-30 INJECTION, SOLUTION INTRAVENOUS CONTINUOUS
Status: DISCONTINUED | OUTPATIENT
Start: 2021-07-03 | End: 2021-07-06

## 2021-07-03 RX ORDER — DILTIAZEM HYDROCHLORIDE 5 MG/ML
10 INJECTION INTRAVENOUS ONCE
Status: COMPLETED | OUTPATIENT
Start: 2021-07-03 | End: 2021-07-03

## 2021-07-03 RX ORDER — ONDANSETRON 2 MG/ML
4 INJECTION INTRAMUSCULAR; INTRAVENOUS ONCE
Status: COMPLETED | OUTPATIENT
Start: 2021-07-03 | End: 2021-07-03

## 2021-07-03 RX ORDER — ACETAMINOPHEN 325 MG/1
650 TABLET ORAL EVERY 6 HOURS PRN
Status: DISCONTINUED | OUTPATIENT
Start: 2021-07-03 | End: 2021-07-10 | Stop reason: HOSPADM

## 2021-07-03 RX ORDER — HEPARIN SODIUM 5000 [USP'U]/ML
5000 INJECTION, SOLUTION INTRAVENOUS; SUBCUTANEOUS EVERY 8 HOURS SCHEDULED
Status: DISCONTINUED | OUTPATIENT
Start: 2021-07-03 | End: 2021-07-03

## 2021-07-03 RX ORDER — METOPROLOL TARTRATE 5 MG/5ML
5 INJECTION INTRAVENOUS EVERY 6 HOURS PRN
Status: DISCONTINUED | OUTPATIENT
Start: 2021-07-03 | End: 2021-07-10 | Stop reason: HOSPADM

## 2021-07-03 RX ORDER — ONDANSETRON 4 MG/1
4 TABLET, ORALLY DISINTEGRATING ORAL EVERY 8 HOURS PRN
Status: DISCONTINUED | OUTPATIENT
Start: 2021-07-03 | End: 2021-07-10 | Stop reason: HOSPADM

## 2021-07-03 RX ORDER — 0.9 % SODIUM CHLORIDE 0.9 %
1000 INTRAVENOUS SOLUTION INTRAVENOUS ONCE
Status: COMPLETED | OUTPATIENT
Start: 2021-07-03 | End: 2021-07-03

## 2021-07-03 RX ORDER — POTASSIUM CHLORIDE 20 MEQ/1
40 TABLET, EXTENDED RELEASE ORAL PRN
Status: DISCONTINUED | OUTPATIENT
Start: 2021-07-03 | End: 2021-07-03 | Stop reason: SINTOL

## 2021-07-03 RX ORDER — SODIUM CHLORIDE 0.9 % (FLUSH) 0.9 %
5-40 SYRINGE (ML) INJECTION EVERY 12 HOURS SCHEDULED
Status: DISCONTINUED | OUTPATIENT
Start: 2021-07-03 | End: 2021-07-10 | Stop reason: HOSPADM

## 2021-07-03 RX ORDER — METOPROLOL TARTRATE 5 MG/5ML
2.5 INJECTION INTRAVENOUS EVERY 6 HOURS
Status: DISCONTINUED | OUTPATIENT
Start: 2021-07-03 | End: 2021-07-03

## 2021-07-03 RX ADMIN — ONDANSETRON 4 MG: 2 INJECTION INTRAMUSCULAR; INTRAVENOUS at 14:34

## 2021-07-03 RX ADMIN — ACETAMINOPHEN 650 MG: 325 TABLET ORAL at 18:06

## 2021-07-03 RX ADMIN — SODIUM CHLORIDE 1000 ML: 9 INJECTION, SOLUTION INTRAVENOUS at 14:34

## 2021-07-03 RX ADMIN — DILTIAZEM HYDROCHLORIDE 10 MG: 5 INJECTION INTRAVENOUS at 14:44

## 2021-07-03 RX ADMIN — CEFTRIAXONE SODIUM 1000 MG: 1 INJECTION, POWDER, FOR SOLUTION INTRAMUSCULAR; INTRAVENOUS at 15:57

## 2021-07-03 RX ADMIN — INSULIN LISPRO 4 UNITS: 100 INJECTION, SOLUTION INTRAVENOUS; SUBCUTANEOUS at 22:10

## 2021-07-03 RX ADMIN — ACETAMINOPHEN 650 MG: 325 TABLET ORAL at 23:23

## 2021-07-03 RX ADMIN — Medication: at 19:55

## 2021-07-03 RX ADMIN — AZITHROMYCIN MONOHYDRATE 500 MG: 500 INJECTION, POWDER, LYOPHILIZED, FOR SOLUTION INTRAVENOUS at 16:37

## 2021-07-03 RX ADMIN — HEPARIN SODIUM 8310 UNITS: 1000 INJECTION INTRAVENOUS; SUBCUTANEOUS at 21:57

## 2021-07-03 RX ADMIN — HEPARIN SODIUM 18 UNITS/KG/HR: 10000 INJECTION, SOLUTION INTRAVENOUS at 21:57

## 2021-07-03 RX ADMIN — SODIUM CHLORIDE, PRESERVATIVE FREE 10 ML: 5 INJECTION INTRAVENOUS at 22:12

## 2021-07-03 ASSESSMENT — ENCOUNTER SYMPTOMS
CONSTIPATION: 0
CHEST TIGHTNESS: 0
APNEA: 0
EYES NEGATIVE: 1
VOMITING: 0
COLOR CHANGE: 0
ABDOMINAL DISTENTION: 0
DIARRHEA: 1
NAUSEA: 1
SHORTNESS OF BREATH: 0
SINUS PAIN: 0

## 2021-07-03 ASSESSMENT — PAIN SCALES - GENERAL
PAINLEVEL_OUTOF10: 4
PAINLEVEL_OUTOF10: 5
PAINLEVEL_OUTOF10: 2
PAINLEVEL_OUTOF10: 4

## 2021-07-03 NOTE — CONSULTS
The potassium/magnesium sliding scale has been automatically discontinued per [facility] approved policy because the patient has decreased renal function (CrCl<30 ml/min). The patient's current K/Mag levels are currently:    Recent Labs     07/03/21  1400   K 3.8       Estimated Creatinine Clearance: 19 mL/min (A) (based on SCr of 2.95 mg/dL (H)). For patients with decreased renal function (below 30ml/min) needing potassium/magnesium supplementation, please order individual bolus doses with appropriate monitoring. Please contact the inpatient pharmacy at [phone] with any concerns. Thank you.     Seth Clemons, [facility]  7/3/2021  7:35 PM

## 2021-07-03 NOTE — ED PROVIDER NOTES
Abnormal; Notable for the following components:    Glucose 275 (*)     BUN 67 (*)     CREATININE 2.95 (*)     Bun/Cre Ratio 23 (*)     Sodium 132 (*)     Chloride 97 (*)     CO2 13 (*)     Anion Gap 22 (*)     AST 37 (*)     GFR Non- 16 (*)     GFR  19 (*)     All other components within normal limits   TROPONIN - Abnormal; Notable for the following components:    Troponin, High Sensitivity 47 (*)     All other components within normal limits   URINALYSIS - Abnormal; Notable for the following components:    Turbidity UA SLIGHTLY CLOUDY (*)     Ketones, Urine TRACE (*)     Urine Hgb 2+ (*)     Protein, UA 1+ (*)     Nitrite, Urine POSITIVE (*)     Leukocyte Esterase, Urine MOD (*)     All other components within normal limits   LIPASE - Abnormal; Notable for the following components:    Lipase 10 (*)     All other components within normal limits   BRAIN NATRIURETIC PEPTIDE - Abnormal; Notable for the following components:    Pro-BNP 13,599 (*)     All other components within normal limits   MICROSCOPIC URINALYSIS - Abnormal; Notable for the following components:    Bacteria, UA MANY (*)     Mucus, UA 1+ (*)     Amorphous, UA 1+ (*)     Yeast, UA MODERATE (*)     All other components within normal limits   CULTURE, BLOOD 1   CULTURE, BLOOD 1   LACTIC ACID   LACTIC ACID   PROCALCITONIN     CONSULTS:  IP CONSULT TO HOSPITALIST  FINAL IMPRESSION      1. Pneumonia of left lung due to infectious organism, unspecified part of lung    2. Acute cystitis with hematuria    3. BRANDON (acute kidney injury) ([facility] [address]    4.  Pleural effusion            PASTMEDICAL HISTORY     Past Medical History:   Diagnosis Date    Anemia     Arrhythmia     Arthritis     Asthma     CKD (chronic kidney disease) stage 1, GFR 90 ml/min or greater     DM type 2 (diabetes mellitus, type 2) ([facility] [address]     HTN (hypertension)     Hypomagnesemia      SURGICAL HISTORY       Past Surgical History:   Procedure Laterality Date    BUNIONECTOMY      CARPAL TUNNEL RELEASE Left 03/2018    CARPAL TUNNEL RELEASE Right 10/2018    CHOLECYSTECTOMY      FINGER TRIGGER RELEASE Right     ring finger    HERNIA REPAIR      HYSTERECTOMY      TONSILLECTOMY AND ADENOIDECTOMY       CURRENT MEDICATIONS       Previous Medications    ALLOPURINOL (ZYLOPRIM) 100 MG TABLET    TAKE 1 TABLET BY MOUTH EVERY DAY    AMLODIPINE (NORVASC) 10 MG TABLET    Take 10 mg by mouth daily     ASPIRIN 81 MG EC TABLET    Take 1 tablet by mouth daily    BIOTIN (BIOTIN 5000) 5 MG CAPS    Take by mouth    BLOOD GLUCOSE MONITORING SUPPL (ONE TOUCH ULTRA 2) W/DEVICE KIT    USE BID UTD    CALCIUM CARBONATE (OSCAL) 500 MG TABS TABLET    Take 1,200 mg by mouth daily Take 2 tabs daily     DICLOFENAC SODIUM 1 % GEL    APPLY 2 GRAMS 4 TIMES DAILY AS DIRECTED    GLIMEPIRIDE (AMARYL) 4 MG TABLET    Take 1 tablet by mouth 2 times daily (with meals)    HYDROCHLOROTHIAZIDE (HYDRODIURIL) 25 MG TABLET    Take 1 tablet by mouth daily    INSULIN GLARGINE (BASAGLAR KWIKPEN SC)    Inject 30 Units into the skin nightly    METFORMIN (GLUCOPHAGE-XR) 500 MG EXTENDED RELEASE TABLET    TAKE 2 TABLETS BY MOUTH TWICE A DAY    METOPROLOL TARTRATE 37.5 MG TABS    Take 37.5 mg by mouth 2 times daily    MULTIPLE VITAMINS-MINERALS (THERAPEUTIC MULTIVITAMIN-MINERALS) TABLET    Take 1 tablet by mouth daily    NONFORMULARY    Circulation and vein support    PRAVASTATIN (PRAVACHOL) 10 MG TABLET    Take 1 tablet by mouth nightly    PROBIOTIC PRODUCT (ACIDOPHILUS PROBIOTIC) CAPS CAPSULE    Take 1 capsule by mouth daily    TIZANIDINE (ZANAFLEX) 4 MG TABLET    TAKE 1 TABLET BY MOUTH, AT BEDTIME DAILY    VALSARTAN (DIOVAN) 320 MG TABLET    TAKE 1 TABLET BY MOUTH EVERY DAY     ALLERGIES     is allergic to iodine, shellfish-derived products, and victoza [liraglutide]. FAMILY HISTORY     has no family status information on file.       SOCIAL HISTORY       Social History     Tobacco Use    Smoking status: Never Smoker    Smokeless tobacco: Never Used   Substance Use Topics    Alcohol use: No    Drug use: No       I personally evaluated and examined the patient in conjunction with the APC and agree with the assessment, treatment plan, and disposition of the patient as recorded by the APC.    So Patel MD  Attending Emergency Physician         So Patel MD  07/03/21 1644

## 2021-07-03 NOTE — ED PROVIDER NOTES
EMERGENCY DEPARTMENT ENCOUNTER    Pt Name: Rocky Mckoy  MRN: [id number]  [place] 1950  Date of evaluation: 7/3/21  CHIEF COMPLAINT       Chief Complaint   Patient presents with    Anorexia     No appetite for one week along with nausea     Fatigue     Pt reports generalized weakness for a couple of days      HISTORY OF PRESENT ILLNESS   65-year-old female presented to the emergency room for generalized fatigue nausea poor p.o. intake and diarrhea. Symptoms have been going on for about 5 days. Patient states that she may have had a fever earlier in her course but has been afebrile for the last 48 hours. She has had a couple episodes daily of loose stools. No blood in the stool. No vomiting or significant abdominal pain but patient reports crampy abdomen and nausea. She has not wanted to eat or drink much. Patient reports she is fully vaccinated for COVID-19. No recent travel. No recent antibiotics. REVIEW OF SYSTEMS     Review of Systems   Constitutional: Positive for activity change, appetite change and fatigue. Negative for chills and diaphoresis. HENT: Negative for congestion, sinus pain and tinnitus. Eyes: Negative. Respiratory: Negative for apnea, chest tightness and shortness of breath. Gastrointestinal: Positive for diarrhea and nausea. Negative for abdominal distention, constipation and vomiting. Genitourinary: Negative for difficulty urinating and frequency. Musculoskeletal: Negative for arthralgias and myalgias. Skin: Negative for color change and rash. Neurological: Positive for weakness. Negative for dizziness. Hematological: Negative. Psychiatric/Behavioral: Negative.         PASTMEDICAL HISTORY     Past Medical History:   Diagnosis Date    Anemia     Arrhythmia     Arthritis     Asthma     CKD (chronic kidney disease) stage 1, GFR 90 ml/min or greater     DM type 2 (diabetes mellitus, type 2) (HCC)     HTN (hypertension)     Hypomagnesemia Past Problem List  Patient Active Problem List   Diagnosis Code    HTN (hypertension) I10    Hypomagnesemia E83.42    CKD (chronic kidney disease) stage 3, GFR 30-59 ml/min ([facility]) N18.30    Vitamin D deficiency E55.9    Reactive airway disease that is not asthma J98.9    Type 2 diabetes mellitus, without long-term current use of insulin ([facility]) E11.9    Mixed hyperlipidemia E78.2    Nontoxic single thyroid nodule E04.1     SURGICAL HISTORY       Past Surgical History:   Procedure Laterality Date    BUNIONECTOMY      CARPAL TUNNEL RELEASE Left 03/2018    CARPAL TUNNEL RELEASE Right 10/2018    CHOLECYSTECTOMY      FINGER TRIGGER RELEASE Right     ring finger    HERNIA REPAIR      HYSTERECTOMY      TONSILLECTOMY AND ADENOIDECTOMY       CURRENT MEDICATIONS       Previous Medications    ALLOPURINOL (ZYLOPRIM) 100 MG TABLET    TAKE 1 TABLET BY MOUTH EVERY DAY    AMLODIPINE (NORVASC) 10 MG TABLET    Take 10 mg by mouth daily     ASPIRIN 81 MG EC TABLET    Take 1 tablet by mouth daily    BIOTIN (BIOTIN 5000) 5 MG CAPS    Take by mouth    BLOOD GLUCOSE MONITORING SUPPL (ONE TOUCH ULTRA 2) W/DEVICE KIT    USE BID UTD    CALCIUM CARBONATE (OSCAL) 500 MG TABS TABLET    Take 1,200 mg by mouth daily Take 2 tabs daily     DICLOFENAC SODIUM 1 % GEL    APPLY 2 GRAMS 4 TIMES DAILY AS DIRECTED    GLIMEPIRIDE (AMARYL) 4 MG TABLET    Take 1 tablet by mouth 2 times daily (with meals)    HYDROCHLOROTHIAZIDE (HYDRODIURIL) 25 MG TABLET    Take 1 tablet by mouth daily    INSULIN GLARGINE (BASAGLAR KWIKPEN SC)    Inject 30 Units into the skin nightly    METFORMIN (GLUCOPHAGE-XR) 500 MG EXTENDED RELEASE TABLET    TAKE 2 TABLETS BY MOUTH TWICE A DAY    METOPROLOL TARTRATE 37.5 MG TABS    Take 37.5 mg by mouth 2 times daily    MULTIPLE VITAMINS-MINERALS (THERAPEUTIC MULTIVITAMIN-MINERALS) TABLET    Take 1 tablet by mouth daily    NONFORMULARY    Circulation and vein support    PRAVASTATIN (PRAVACHOL) 10 MG TABLET    Take 1 tablet by mouth nightly    PROBIOTIC PRODUCT (ACIDOPHILUS PROBIOTIC) CAPS CAPSULE    Take 1 capsule by mouth daily    TIZANIDINE (ZANAFLEX) 4 MG TABLET    TAKE 1 TABLET BY MOUTH, AT BEDTIME DAILY    VALSARTAN (DIOVAN) 320 MG TABLET    TAKE 1 TABLET BY MOUTH EVERY DAY     ALLERGIES     is allergic to iodine, shellfish-derived products, and victoza [liraglutide]. FAMILY HISTORY     has no family status information on file. SOCIAL HISTORY       Social History     Tobacco Use    Smoking status: Never Smoker    Smokeless tobacco: Never Used   Substance Use Topics    Alcohol use: No    Drug use: No     PHYSICAL EXAM     INITIAL VITALS: /68   Pulse 111   Temp 97.5 °F (36.4 °C) (Oral)   Resp 20   Ht 5' (1.524 m)   Wt 229 lb (103.9 kg)   SpO2 95%   BMI 44.72 kg/m²    Physical Exam  Constitutional:       General: She is not in acute distress. Appearance: She is well-developed. She is ill-appearing. HENT:      Head: Normocephalic. Eyes:      Pupils: Pupils are equal, round, and reactive to light. Cardiovascular:      Rate and Rhythm: Tachycardia present. Rhythm irregular. Heart sounds: Normal heart sounds. Pulmonary:      Effort: Pulmonary effort is normal. No respiratory distress. Breath sounds: Normal breath sounds. Abdominal:      General: Bowel sounds are normal.      Palpations: Abdomen is soft. Tenderness: There is no abdominal tenderness. Musculoskeletal:         General: Normal range of motion. Skin:     General: Skin is warm and dry. Neurological:      Mental Status: She is alert and oriented to person, place, and time. MEDICAL DECISION MAKINyear old female presenting to the ED for 5 day history of nausea, general malaise and fatigue with poor PO intake. Patient meets sepsis criteria in the ED with elevated HR, white count with source of infection- UTI as well as suspected pneumonia on CT. Patient started on azithromycin and rocephin.    She is other components within normal limits   BRAIN NATRIURETIC PEPTIDE - Abnormal; Notable for the following components:    Pro-BNP 13,599 (*)     All other components within normal limits   LACTIC ACID   URINALYSIS       EMERGENCY DEPARTMENTCOURSE:         Vitals:    Vitals:    07/03/21 1345 07/03/21 1400 07/03/21 1415 07/03/21 1430   BP: (!) 134/37 (!) 144/67 (!) 138/48 127/68   Pulse: 115 129 109 111   Resp: 25 20 21 20   Temp:       TempSrc:       SpO2: 97% 96% 95% 95%   Weight:       Height:           The patient was given the following medications while in the emergency department:  Orders Placed This Encounter   Medications    0.9 % sodium chloride bolus    ondansetron (ZOFRAN) injection 4 mg    dilTIAZem injection 10 mg     CONSULTS:  None    FINAL IMPRESSION    No diagnosis found. DISPOSITION/PLAN   DISPOSITION        PATIENT REFERRED TO:  No follow-up provider specified.   DISCHARGE MEDICATIONS:  New Prescriptions    No medications on file     Malathi Macias MD  Attending Emergency Physician                 Guerda Kelly MD  07/08/21 0055

## 2021-07-04 ENCOUNTER — APPOINTMENT (OUTPATIENT)
Dept: CT IMAGING | Age: 71
DRG: 193 | End: 2021-07-04
Payer: MEDICARE

## 2021-07-04 LAB
ABSOLUTE EOS #: <0.03 K/UL (ref 0–0.44)
ABSOLUTE IMMATURE GRANULOCYTE: 0.09 K/UL (ref 0–0.3)
ABSOLUTE LYMPH #: 1.38 K/UL (ref 1.1–3.7)
ABSOLUTE MONO #: 1.25 K/UL (ref 0.1–1.2)
ANION GAP SERPL CALCULATED.3IONS-SCNC: 17 MMOL/L (ref 9–17)
ANTI-XA UNFRAC HEPARIN: 0.72 IU/L (ref 0.3–0.7)
ANTI-XA UNFRAC HEPARIN: 0.79 IU/L (ref 0.3–0.7)
ANTI-XA UNFRAC HEPARIN: 0.84 IU/L (ref 0.3–0.7)
BASOPHILS # BLD: 0 % (ref 0–2)
BASOPHILS ABSOLUTE: 0.05 K/UL (ref 0–0.2)
BUN BLDV-MCNC: 63 MG/DL (ref 8–23)
BUN/CREAT BLD: 22 (ref 9–20)
C-REACTIVE PROTEIN: 161.1 MG/L (ref 0–5)
CALCIUM SERPL-MCNC: 8.8 MG/DL (ref 8.6–10.4)
CHLORIDE BLD-SCNC: 99 MMOL/L (ref 98–107)
CO2: 18 MMOL/L (ref 20–31)
COMPLEMENT C3: 170 MG/DL (ref 90–180)
COMPLEMENT C4: 33 MG/DL (ref 10–40)
CREAT SERPL-MCNC: 2.9 MG/DL (ref 0.5–0.9)
CREATININE URINE: 97.7 MG/DL (ref 28–217)
DIFFERENTIAL TYPE: ABNORMAL
EOSINOPHILS RELATIVE PERCENT: 0 % (ref 1–4)
FREE KAPPA/LAMBDA RATIO: 1.63 (ref 0.26–1.65)
GFR AFRICAN AMERICAN: 19 ML/MIN
GFR NON-AFRICAN AMERICAN: 16 ML/MIN
GFR SERPL CREATININE-BSD FRML MDRD: ABNORMAL ML/MIN/{1.73_M2}
GFR SERPL CREATININE-BSD FRML MDRD: ABNORMAL ML/MIN/{1.73_M2}
GLUCOSE BLD-MCNC: 141 MG/DL (ref 65–105)
GLUCOSE BLD-MCNC: 159 MG/DL (ref 70–99)
GLUCOSE BLD-MCNC: 195 MG/DL (ref 65–105)
GLUCOSE BLD-MCNC: 204 MG/DL (ref 65–105)
GLUCOSE BLD-MCNC: 276 MG/DL (ref 65–105)
HCT VFR BLD CALC: 32.8 % (ref 36.3–47.1)
HEMOGLOBIN: 10.9 G/DL (ref 11.9–15.1)
IMMATURE GRANULOCYTES: 1 %
INR BLD: 1.1
KAPPA FREE LIGHT CHAINS QNT: 2.34 MG/DL (ref 0.37–1.94)
LAMBDA FREE LIGHT CHAINS QNT: 1.44 MG/DL (ref 0.57–2.63)
LYMPHOCYTES # BLD: 11 % (ref 24–43)
MAGNESIUM: 2 MG/DL (ref 1.6–2.6)
MCH RBC QN AUTO: 30.1 PG (ref 25.2–33.5)
MCHC RBC AUTO-ENTMCNC: 33.2 G/DL (ref 28.4–34.8)
MCV RBC AUTO: 90.6 FL (ref 82.6–102.9)
MONOCYTES # BLD: 10 % (ref 3–12)
NRBC AUTOMATED: 0 PER 100 WBC
PDW BLD-RTO: 13.2 % (ref 11.8–14.4)
PLATELET # BLD: 226 K/UL (ref 138–453)
PLATELET ESTIMATE: ABNORMAL
PMV BLD AUTO: 11.1 FL (ref 8.1–13.5)
POTASSIUM SERPL-SCNC: 3.5 MMOL/L (ref 3.7–5.3)
PROTHROMBIN TIME: 14.4 SEC (ref 11.5–14.2)
RBC # BLD: 3.62 M/UL (ref 3.95–5.11)
RBC # BLD: ABNORMAL 10*6/UL
SEG NEUTROPHILS: 78 % (ref 36–65)
SEGMENTED NEUTROPHILS ABSOLUTE COUNT: 9.37 K/UL (ref 1.5–8.1)
SODIUM BLD-SCNC: 134 MMOL/L (ref 135–144)
TOTAL PROTEIN, URINE: 27 MG/DL
TROPONIN INTERP: ABNORMAL
TROPONIN T: ABNORMAL NG/ML
TROPONIN, HIGH SENSITIVITY: 41 NG/L (ref 0–14)
URINE TOTAL PROTEIN CREATININE RATIO: 0.28 (ref 0–0.2)
WBC # BLD: 12.2 K/UL (ref 3.5–11.3)
WBC # BLD: ABNORMAL 10*3/UL

## 2021-07-04 PROCEDURE — 97162 PT EVAL MOD COMPLEX 30 MIN: CPT

## 2021-07-04 PROCEDURE — 2580000003 HC RX 258: Performed by: FAMILY MEDICINE

## 2021-07-04 PROCEDURE — 86160 COMPLEMENT ANTIGEN: CPT

## 2021-07-04 PROCEDURE — 6370000000 HC RX 637 (ALT 250 FOR IP): Performed by: FAMILY MEDICINE

## 2021-07-04 PROCEDURE — 85025 COMPLETE CBC W/AUTO DIFF WBC: CPT

## 2021-07-04 PROCEDURE — 84484 ASSAY OF TROPONIN QUANT: CPT

## 2021-07-04 PROCEDURE — 2580000003 HC RX 258: Performed by: EMERGENCY MEDICINE

## 2021-07-04 PROCEDURE — 83883 ASSAY NEPHELOMETRY NOT SPEC: CPT

## 2021-07-04 PROCEDURE — 86334 IMMUNOFIX E-PHORESIS SERUM: CPT

## 2021-07-04 PROCEDURE — 84156 ASSAY OF PROTEIN URINE: CPT

## 2021-07-04 PROCEDURE — 80048 BASIC METABOLIC PNL TOTAL CA: CPT

## 2021-07-04 PROCEDURE — 85520 HEPARIN ASSAY: CPT

## 2021-07-04 PROCEDURE — 82272 OCCULT BLD FECES 1-3 TESTS: CPT

## 2021-07-04 PROCEDURE — 2500000003 HC RX 250 WO HCPCS: Performed by: EMERGENCY MEDICINE

## 2021-07-04 PROCEDURE — 71250 CT THORAX DX C-: CPT

## 2021-07-04 PROCEDURE — 97116 GAIT TRAINING THERAPY: CPT

## 2021-07-04 PROCEDURE — 83735 ASSAY OF MAGNESIUM: CPT

## 2021-07-04 PROCEDURE — 84155 ASSAY OF PROTEIN SERUM: CPT

## 2021-07-04 PROCEDURE — 6370000000 HC RX 637 (ALT 250 FOR IP): Performed by: INTERNAL MEDICINE

## 2021-07-04 PROCEDURE — 2060000000 HC ICU INTERMEDIATE R&B

## 2021-07-04 PROCEDURE — 82570 ASSAY OF URINE CREATININE: CPT

## 2021-07-04 PROCEDURE — 86225 DNA ANTIBODY NATIVE: CPT

## 2021-07-04 PROCEDURE — 83516 IMMUNOASSAY NONANTIBODY: CPT

## 2021-07-04 PROCEDURE — 84165 PROTEIN E-PHORESIS SERUM: CPT

## 2021-07-04 PROCEDURE — 36415 COLL VENOUS BLD VENIPUNCTURE: CPT

## 2021-07-04 PROCEDURE — 84166 PROTEIN E-PHORESIS/URINE/CSF: CPT

## 2021-07-04 PROCEDURE — 82947 ASSAY GLUCOSE BLOOD QUANT: CPT

## 2021-07-04 PROCEDURE — 6360000002 HC RX W HCPCS: Performed by: FAMILY MEDICINE

## 2021-07-04 PROCEDURE — 85610 PROTHROMBIN TIME: CPT

## 2021-07-04 PROCEDURE — 86140 C-REACTIVE PROTEIN: CPT

## 2021-07-04 PROCEDURE — 86038 ANTINUCLEAR ANTIBODIES: CPT

## 2021-07-04 RX ORDER — METOPROLOL TARTRATE 50 MG/1
50 TABLET, FILM COATED ORAL 2 TIMES DAILY
Status: DISCONTINUED | OUTPATIENT
Start: 2021-07-04 | End: 2021-07-05

## 2021-07-04 RX ADMIN — DILTIAZEM HYDROCHLORIDE 30 MG: 30 TABLET, FILM COATED ORAL at 07:17

## 2021-07-04 RX ADMIN — INSULIN LISPRO 4 UNITS: 100 INJECTION, SOLUTION INTRAVENOUS; SUBCUTANEOUS at 17:04

## 2021-07-04 RX ADMIN — CEFTRIAXONE SODIUM 1000 MG: 1 INJECTION, POWDER, FOR SOLUTION INTRAMUSCULAR; INTRAVENOUS at 16:02

## 2021-07-04 RX ADMIN — ACETAMINOPHEN 650 MG: 325 TABLET ORAL at 15:58

## 2021-07-04 RX ADMIN — INSULIN LISPRO 2 UNITS: 100 INJECTION, SOLUTION INTRAVENOUS; SUBCUTANEOUS at 09:00

## 2021-07-04 RX ADMIN — AZITHROMYCIN MONOHYDRATE 500 MG: 500 INJECTION, POWDER, LYOPHILIZED, FOR SOLUTION INTRAVENOUS at 17:04

## 2021-07-04 RX ADMIN — INSULIN LISPRO 6 UNITS: 100 INJECTION, SOLUTION INTRAVENOUS; SUBCUTANEOUS at 13:03

## 2021-07-04 RX ADMIN — HEPARIN SODIUM 17 UNITS/KG/HR: 10000 INJECTION, SOLUTION INTRAVENOUS at 11:10

## 2021-07-04 RX ADMIN — ACETAMINOPHEN 650 MG: 325 TABLET ORAL at 08:09

## 2021-07-04 RX ADMIN — METOPROLOL TARTRATE 50 MG: 50 TABLET, FILM COATED ORAL at 07:17

## 2021-07-04 RX ADMIN — INSULIN LISPRO 4 UNITS: 100 INJECTION, SOLUTION INTRAVENOUS; SUBCUTANEOUS at 20:06

## 2021-07-04 RX ADMIN — Medication: at 09:17

## 2021-07-04 RX ADMIN — METOPROLOL TARTRATE 50 MG: 50 TABLET, FILM COATED ORAL at 20:02

## 2021-07-04 RX ADMIN — DILTIAZEM HYDROCHLORIDE 30 MG: 30 TABLET, FILM COATED ORAL at 20:02

## 2021-07-04 ASSESSMENT — PAIN DESCRIPTION - ORIENTATION: ORIENTATION: LOWER

## 2021-07-04 ASSESSMENT — PAIN SCALES - GENERAL
PAINLEVEL_OUTOF10: 6
PAINLEVEL_OUTOF10: 3
PAINLEVEL_OUTOF10: 6
PAINLEVEL_OUTOF10: 4
PAINLEVEL_OUTOF10: 3

## 2021-07-04 ASSESSMENT — PAIN DESCRIPTION - LOCATION: LOCATION: BACK

## 2021-07-04 ASSESSMENT — PAIN DESCRIPTION - PAIN TYPE: TYPE: CHRONIC PAIN

## 2021-07-04 NOTE — FLOWSHEET NOTE
07/04/21 0640   Provider Notification   Reason for Communication   (Low potassium 3.5)   Provider Name Dr. Radha Moya   Provider Notification Physician   Method of Communication Call   Response Waiting for response   Notification Time 6070   No replacement orders.

## 2021-07-04 NOTE — CONSULTS
[facility]  In Patient Cardiology Consult             Date:   7/4/2021  Patient name: Heather Scott  Date of admission:  7/3/2021  1:37 PM  MRN:   [id number]  YOB: 1950    Reason for Admission: Fatigue, nausea, poor intake. CHIEF COMPLAINT:   Fatigue, nausea, poor intake. History Obtained From: Patient and chart    HISTORY OF PRESENT ILLNESS:    This is a 65-year-old female with history as below. She presented due to 5-day onset of fatigue, nausea, poor intake. In the emergency room she was noted to have a possible pneumonia. She was admitted for further work-up. While hospitalized she was noted to be in new onset atrial fibrillation with RVR. She was given IV Lopressor started on a heparin drip. We are consulted due to the new onset A. fib. She denies a complaints of chest pain, orthopnea, PND, palpitations, lower extremity edema. Past Medical History:   has a past medical history of Anemia, Arrhythmia, Arthritis, Asthma, CKD (chronic kidney disease) stage 1, GFR 90 ml/min or greater, DM type 2 (diabetes mellitus, type 2) ([facility] [address] HTN (hypertension), and Hypomagnesemia. Past Surgical History:   has a past surgical history that includes Hysterectomy; Tonsillectomy and adenoidectomy; Bunionectomy; Cholecystectomy; hernia repair; Carpal tunnel release (Left, 03/2018); Carpal tunnel release (Right, 10/2018); and Finger trigger release (Right). Home Medications:    Prior to Admission medications    Medication Sig Start Date End Date Taking?  Authorizing Provider   valsartan (DIOVAN) 320 MG tablet TAKE 1 TABLET BY MOUTH EVERY DAY 6/29/21  Yes Rowan Solis MD   allopurinol (ZYLOPRIM) 100 MG tablet TAKE 1 TABLET BY MOUTH EVERY DAY 7/6/20  Yes Historical Provider, MD   Multiple Vitamins-Minerals (THERAPEUTIC MULTIVITAMIN-MINERALS) tablet Take 1 tablet by mouth daily   Yes Historical Provider, MD   Biotin (BIOTIN 5000) 5 MG CAPS Take by mouth   Yes Historical Provider, MD   Insulin Glargine (BASAGLAR KWIKPEN SC) Inject 30 Units into the skin nightly   Yes Historical Provider, MD   metFORMIN (GLUCOPHAGE-XR) 500 MG extended release tablet TAKE 2 TABLETS BY MOUTH TWICE A DAY 7/20/19  Yes Historical Provider, MD   amLODIPine (NORVASC) 10 MG tablet Take 10 mg by mouth daily    Yes Historical Provider, MD   tiZANidine (ZANAFLEX) 4 MG tablet TAKE 1 TABLET BY MOUTH, AT BEDTIME DAILY 7/31/19  Yes Historical Provider, MD   Probiotic Product (ACIDOPHILUS PROBIOTIC) CAPS capsule Take 1 capsule by mouth daily   Yes Historical Provider, MD   aspirin 81 MG EC tablet Take 1 tablet by mouth daily 6/21/19  Yes Mt Narayan,    glimepiride (AMARYL) 4 MG tablet Take 1 tablet by mouth 2 times daily (with meals)  Patient taking differently: Take 8 mg by mouth every morning  6/21/19  Yes Mt Narayan DO   pravastatin (PRAVACHOL) 10 MG tablet Take 1 tablet by mouth nightly 6/21/19  Yes Mt Narayan DO   Metoprolol Tartrate 37.5 MG TABS Take 37.5 mg by mouth 2 times daily  Patient taking differently: Take 50 mg by mouth daily  6/21/19  Yes Mt Narayan DO   hydrochlorothiazide (HYDRODIURIL) 25 MG tablet Take 1 tablet by mouth daily  Patient taking differently: Take 50 mg by mouth daily  6/21/19  Yes Isaiah Narayan DO   calcium carbonate (OSCAL) 500 MG TABS tablet Take 1,200 mg by mouth daily Take 2 tabs daily    Yes Historical Provider, MD   diclofenac sodium 1 % GEL APPLY 2 GRAMS 4 TIMES DAILY AS DIRECTED 12/3/19   Historical Provider, MD   NONFORMULARY Circulation and vein support    Historical Provider, MD   Blood Glucose Monitoring Suppl (ONE TOUCH ULTRA 2) W/DEVICE KIT USE BID UTD 7/21/16   Historical Provider, MD       Allergies:  Iodine, Shellfish-derived products, and Victoza [liraglutide]    Social History:   reports that she has never smoked.  She has never used smokeless tobacco. She reports that she does not drink alcohol and does not use drugs. Family History:   History reviewed. No pertinent family history. REVIEW OF SYSTEMS:    · Constitutional: there has been no unanticipated weight loss. There's been No change in energy level, No change in activity level. · Eyes: No visual changes or diplopia. No scleral icterus. · ENT: No Headaches, hearing loss or vertigo. No mouth sores or sore throat. · Cardiovascular: As HPI  · Respiratory: As HPI  · Gastrointestinal: No abdominal pain, appetite loss, blood in stools. No change in bowel or bladder habits. · Genitourinary: No dysuria, trouble voiding, or hematuria. · Musculoskeletal:  No gait disturbance, No weakness or joint complaints. · Integumentary: No rash or pruritis. · Neurological: No headache, diplopia, change in muscle strength, numbness or tingling. No change in gait, balance, coordination, mood, affect, memory, mentation, behavior. · Psychiatric: No anxiety, or depression. · Endocrine: No temperature intolerance. No excessive thirst, fluid intake, or urination. No tremor. · Hematologic/Lymphatic: No abnormal bruising or bleeding, blood clots or swollen lymph nodes. · Allergic/Immunologic: No nasal congestion or hives. PHYSICAL EXAM:    Physical Examination:    BP (!) 126/59   Pulse 103   Temp 98.5 °F (36.9 °C) (Oral)   Resp 20   Ht 5' (1.524 m)   Wt 229 lb (103.9 kg)   SpO2 96%   BMI 44.72 kg/m²    Constitutional and General Appearance: alert, cooperative, no distress and appears stated age  HEENT: PERRL, no cervical lymphadenopathy. No masses palpable. Normal oral mucosa  Respiratory:  · Normal excursion and expansion without use of accessory muscles  · Resp Auscultation: Good respiratory effort. No for increased work of breathing.  On auscultation: reduced but clear  Cardiovascular:  · Heart tones are crisp and normal. irregular S1 and S2. Murmurs: none  · Jugular venous pulsation Normal  Abdomen:  · No masses or tenderness  · Bowel sounds present  Extremities:  ·  No Cyanosis or Clubbing  ·  Lower extremity edema: none  ·  Skin: Warm and dry  Neurological:  · Alert and oriented. · Moves all extremities well  · No abnormalities of mood, affect, memory, mentation, or behavior are noted    DATA:    Diagnostics:      EKG:   Results for orders placed or performed during the hospital encounter of 07/03/21   EKG 12 Lead   Result Value Ref Range    Ventricular Rate 118 BPM    Atrial Rate 118 BPM    QRS Duration 98 ms    Q-T Interval 284 ms    QTc Calculation (Bazett) 398 ms    R Axis -43 degrees    T Axis 67 degrees    Narrative    Atrial fibrillation with rapid ventricular response  Left axis deviation  Abnormal ECG  When compared with ECG of 17-JUN-2019 05:35,  Atrial fibrillation has replaced Sinus rhythm  Vent. rate has increased BY  49 BPM       Labs:     CBC:   Recent Labs     07/03/21 2106 07/04/21 0428   WBC 12.5* 12.2*   HGB 10.1* 10.9*   HCT 30.6* 32.8*    226     BMP:   Recent Labs     07/03/21  1400 07/04/21  0428   * 134*   K 3.8 3.5*   CO2 13* 18*   BUN 67* 63*   CREATININE 2.95* 2.90*   LABGLOM 16* 16*   GLUCOSE 275* 159*     BNP: No results for input(s): BNP in the last 72 hours.   PT/INR:   Recent Labs     07/03/21 2106 07/04/21 0428   PROTIME 14.6* 14.4*   INR 1.2 1.1     APTT:  Recent Labs     07/03/21 2106   APTT 31.0     CARDIAC ENZYMES:  Recent Labs     07/03/21  1400   TROPHS 47*     FASTING LIPID PANEL:  Lab Results   Component Value Date    HDL 76 04/07/2021     LIVER PROFILE:  Recent Labs     07/03/21  1400   AST 37*   ALT 30   LABALBU 3.7         IMPRESSION:    Patient Active Problem List   Diagnosis    HTN (hypertension)    Hypomagnesemia    CKD (chronic kidney disease) stage 3, GFR 30-59 ml/min (HCC)    Vitamin D deficiency    Reactive airway disease that is not asthma    Type 2 diabetes mellitus, without long-term current use of insulin (HCC)    Mixed hyperlipidemia    Nontoxic single thyroid nodule    Pneumonia     - Afib with RVR- new onset  - Elevated troponin due to BRANDON on CKD  - PNA  - BRANDON on CKD  - HTN  - DL  - DM2    RECOMMENDATIONS:  - repeat trop  - heparin drip  - lopressor 50 po bid  - cardizem 30 po bid  - 2d Echo  - on IVF and ABX per primary team  - will need eliquis on d/c    Discussed with patient and nursing. Thank you for allowing me to participate in the care of this patient, please do not hesitate to call if you have any questions. Ronnie Rausch DO, [facility] [place], [address], [address] [facility]  [email or web address]  [phone]

## 2021-07-04 NOTE — PROGRESS NOTES
Anti XA 0.72, Lab just resulted but in the chart it is documented in it came in a 0428 which is false.

## 2021-07-04 NOTE — FLOWSHEET NOTE
Patient is frustrated, worried about her many medical situations. States I never felt this way before, Iv 'e always has been healthy.  shared in presence, listening, prayers. Follow up as needed. 07/04/21 1325   Encounter Summary   Services provided to: Patient   Referral/Consult From: [facility] Family members   Continue Visiting   (7-4-21)   Complexity of Encounter Moderate   Length of Encounter 30 minutes   Spiritual Assessment Completed Yes   Routine   Type Initial   Assessment Passive; Anxious   Intervention Explored feelings, thoughts, concerns; Active listening;Prayer;Discussed illness/injury and it's impact; Discussed belief system/Faith practices/poly;Sustaining presence/ Ministry of presence   Outcome Expressed gratitude;Receptive

## 2021-07-04 NOTE — CONSULTS
NEPHROLOGY CONSULT     Patient :  Denzel Goodman; 66 y.o. MRN# [id number]  Location:  1466/4571-07  Attending:  Rachel Henley DO  Admit Date:  7/3/2021   Hospital Day: 1      Reason for Consult: Acute kidney injury on CKD      Chief Complaint: Lethargy fatigue nausea  History Obtained From: Patient    History of Present Illness: This is a 66 y.o. female with past medical history chronic kidney disease 3 a with baseline creatinine of 1.2-1.4 mg/dL, type 2 diabetes, essential hypertension. Patient presented to the hospital with complaints of lethargy chills not feeling good poor oral intake and decrease in appetite for about 1 week nausea no vomiting. Patient did have few loose stools, patient also complained of cough and saw some streaks of blood twice  Patient also noted tachycardia,notedto be Afib. CT abdomen and pelvis showed left lower lobe pneumonia with small left-sided pleural effusion, scattered areas of colonic wall thickening. Pt denies any history of  prolonged NSAID use. Patient denies dysuria, gross hematuria, flank pain, nocturia, urgency, passing frothy urine or urinary incontinence. There has been no recent exposure to IV contrast.   There is no history  of paraprotein disease. Pt denies any history of recurrent UTI or kidney stones.   Medication review shows use of ARB's, hydrochlorothiazide, Metformin  Blood pressure stable no hypotension noted    Past Medical History:        Diagnosis Date    Anemia     Arrhythmia     Arthritis     Asthma     CKD (chronic kidney disease) stage 1, GFR 90 ml/min or greater     DM type 2 (diabetes mellitus, type 2) ([facility] [address]     HTN (hypertension)     Hypomagnesemia        Past Surgical History:        Procedure Laterality Date    BUNIONECTOMY      CARPAL TUNNEL RELEASE Left 03/2018    CARPAL TUNNEL RELEASE Right 10/2018    CHOLECYSTECTOMY      FINGER TRIGGER RELEASE Right     ring finger    HERNIA REPAIR      HYSTERECTOMY      TONSILLECTOMY AND ADENOIDECTOMY         Current Medications:    metoprolol tartrate (LOPRESSOR) tablet 50 mg, BID  dilTIAZem (CARDIZEM) tablet 30 mg, 2 times per day  sodium chloride flush 0.9 % injection 5-40 mL, 2 times per day  sodium chloride flush 0.9 % injection 10 mL, PRN  0.9 % sodium chloride infusion, PRN  ondansetron (ZOFRAN-ODT) disintegrating tablet 4 mg, Q8H PRN   Or  ondansetron (ZOFRAN) injection 4 mg, Q6H PRN  polyethylene glycol (GLYCOLAX) packet 17 g, Daily PRN  acetaminophen (TYLENOL) tablet 650 mg, Q6H PRN   Or  acetaminophen (TYLENOL) suppository 650 mg, Q6H PRN  cefTRIAXone (ROCEPHIN) 1000 mg IVPB in 50 mL D5W minibag, Q24H  azithromycin (ZITHROMAX) 500 mg in D5W 250ml addavial, Q24H  insulin lispro (HUMALOG) injection vial 0-12 Units, TID WC  insulin lispro (HUMALOG) injection vial 0-6 Units, Nightly  glucose (GLUTOSE) 40 % oral gel 15 g, PRN  dextrose 50 % IV solution, PRN  glucagon (rDNA) injection 1 mg, PRN  dextrose 5 % solution, PRN  sodium bicarbonate 75 mEq in sodium chloride 0.45 % 1,000 mL infusion, Continuous  heparin (porcine) injection 8,310 Units, PRN  heparin (porcine) injection 4,160 Units, PRN  heparin 25,000 units in dextrose 5% 250 mL (premix) infusion, Continuous  metoprolol (LOPRESSOR) injection 5 mg, Q6H PRN        Allergies:  Iodine, Shellfish-derived products, and Victoza [liraglutide]    Social History:   Social History     Socioeconomic History    Marital status: Single     Spouse name: Not on file    Number of children: Not on file    Years of education: Not on file    Highest education level: Not on file   Occupational History    Not on file   Tobacco Use    Smoking status: Never Smoker    Smokeless tobacco: Never Used   Substance and Sexual Activity    Alcohol use: No    Drug use: No    Sexual activity: Not on file   Other Topics Concern    Not on file   Social History Narrative    Not on file     Social Determinants of Health     Financial Resource Strain:     Difficulty of Paying Living Expenses:    Food Insecurity:     Worried About Running Out of Food in the Last Year:     [address] in the Last Year:    Transportation Needs:     Lack of Transportation (Medical):  Lack of Transportation (Non-Medical):    Physical Activity:     Days of Exercise per Week:     Minutes of Exercise per Session:    Stress:     Feeling of Stress :    Social Connections:     Frequency of Communication with Friends and Family:     Frequency of Social Gatherings with Friends and Family:     Attends Anab[employer]ist Services:     Active Member of Clubs or Organizations:     Attends Club or Organization Meetings:     Marital Status:    Intimate Partner Violence:     Fear of Current or Ex-Partner:     Emotionally Abused:     Physically Abused:     Sexually Abused:        Family History:   History reviewed. No pertinent family history. Review of Systems:    Constitutional: No fever, no chills, no night sweats, +fatigue, generalized weakness,+ loss of appetite  HEENT:  No headache, otalgia, itchy eyes, epistaxis, nasal discharge or sore throat. Cardiac:  No chest pain, dyspnea, orthopnea or PND, palpitations  Chest:              No cough, hemoptysis, pleuritic chest pain, wheezing,SOB  Abdomen:  No abdominal pain, nausea, vomiting, diarrhea, melena, dysphagia                                      hematemesis,constipation, abdominal bloating, flank pain  Neuro:              No CVA, TIA or seizure like activity. Skin:   No rashes, no itching. :   No hematuria, no pyuria, no dysuria, no flank pain. Extremities:  No swelling or joint pains.       Objective:  CURRENT TEMPERATURE:  Temp: 99.1 °F (37.3 °C)  MAXIMUM TEMPERATURE OVER 24HRS:  Temp (24hrs), Av.7 °F (37.1 °C), Min:98.2 °F (36.8 °C), Max:99.1 °F (37.3 °C)    CURRENT RESPIRATORY RATE:  Resp: 20  CURRENT PULSE:  Pulse: 83  CURRENT BLOOD PRESSURE:  BP: (!) 118/56  24HR BLOOD PRESSURE RANGE:  Systolic (24hrs), Av , Min:101 , GKJ:[id number]   ; Diastolic (79SPE), ZFY:20, Min:56, Max:69    24HR INTAKE/OUTPUT:      Intake/Output Summary (Last 24 hours) at 2021 1442  Last data filed at 2021 1003  Gross per 24 hour   Intake 150 ml   Output 1200 ml   Net -1050 ml     Patient Vitals for the past 96 hrs (Last 3 readings):   Weight   21 1327 229 lb (103.9 kg)       Physical Exam:  GENERAL APPEARANCE: Alert and cooperative, and appears to be in no acute distress. HEAD: normocephalic  EYES:  EOMI. Not pale, anicteric   NOSE:  No nasal discharge. THROAT:  Oral cavity and pharynx normal. Moist  CARDIAC: Normal S1 and S2. No S3, S4 or murmurs. Rhythm is regular. LUNGS: Clear to auscultation and percussion, rales left lower base diminished breath sounds. NECK: Neck supple, non-tender     MUSKULOSKELETAL: Adequately aligned spine. No joint erythema or tenderness. EXTREMITIES: No edema. Peripheral pulses intact. NEURO: Non focal      Labs:   CBC:  Recent Labs     21  1400 21  0428   WBC 14.6* 12.5* 12.2*   RBC 3.97 3.38* 3.62*   HGB 11.9 10.1* 10.9*   HCT 36.4 30.6* 32.8*   MCV 91.7 90.5 90.6   MCH 30.0 29.9 30.1   MCHC 32.7 33.0 33.2   RDW 13.2 13.2 13.2    206 226   MPV 11.4 11.4 11.1      BMP:   Recent Labs     21  1400 21  0428   * 134*   K 3.8 3.5*   CL 97* 99   CO2 13* 18*   BUN 67* 63*   CREATININE 2.95* 2.90*   GLUCOSE 275* 159*   CALCIUM 9.3 8.8      Phosphorus:  No results for input(s): PHOS in the last 72 hours. Magnesium:   Recent Labs     21  21021  0428   MG 2.0 2.0     Albumin:   Recent Labs     21  1400   LABALBU 3.7         Recent Labs     21  1400   PROT 7.0     UPEP: No results for input(s): TPU in the last 72 hours. Urine Sodium:    Recent Labs     21  1612   QUYEN 31      Urine Potassium: No results for input(s): KUR in the last 72 hours. Urine Chloride:   Invalid input(s): CLU  Urine Ph:  Invalid input(s): PO4U  Urine Osmolarity: No results for input(s): OSMOU in the last 72 hours. Urine Creatinine:    Recent Labs     07/03/21  1612   LABCREA 132.2     Urine Eosinophils: Invalid input(s): EOSU  Urine Protein:  No results for input(s): TPU in the last 72 hours. Urinalysis:    Recent Labs     07/03/21  1612   NITRU POSITIVE*   COLORU YELLOW   PHUR 5.5   WBCUA 20 TO 50   RBCUA 5 TO 10   MUCUS 1+*   TRICHOMONAS NOT REPORTED   YEAST MODERATE*   BACTERIA MANY*   SPECGRAV 1.025   LEUKOCYTESUR MOD*   UROBILINOGEN Normal   BILIRUBINUR NEGATIVE   GLUCOSEU NEGATIVE   KETUA TRACE*   AMORPHOUS 1+*         Radiology:  Reviewed as available. Assessment:  1. BRANDON on CKD most likely secondary to prerenal azotemia, from poor oral intake use of ARB and diuretics  2. Type 2 diabetes  3. Essential hypertension  4. CKD stage III with baseline creatinine of 1.2 to 1.4 mg/dL most likely secondary to diabetic nephropathy  5. UTI  6. CT finding of suspected lower lobe pneumonia-on IV antibiotics       Plan:  IV fluids half-normal saline with 70 5M EQ sodium bicarbonate 75 mils per hour  1. Comprehensive urine testing including Urinalysis, Urine sodium, potassium, chloride, Urine protein and creatinine to quantify the proteinuria if present. Will check urinary eosinophils. 2. Will Order serum and urine protein electrophoresis to r/o element of occult dysproteinemia. 3. Check  Complement levels, DARI and serologies. Thank you for the consultation.       Electronically signed by Porsche Parrish MD on 7/4/2021 at 2:42 PM

## 2021-07-04 NOTE — H&P
History and Physical [facility])    [facility]     CHIEF COMPLAINT: Anorexia (No appetite for one week along with nausea ) and Fatigue (Pt reports generalized weakness for a couple of days )      Patient Status: Inpatient [101]      HISTORY OF PRESENT ILLNESS:    From the ER this patient is a 66 y.o. female whopresents with  Chief Complaint   Patient presents with    Anorexia     No appetite for one week along with nausea     Fatigue     Pt reports generalized weakness for a couple of days    . HPI  50-year-old female presented to the emergency room for generalized fatigue nausea poor p.o. intake and diarrhea. Symptoms have been going on for about 5 days. Patient states that she may have had a fever earlier in her course but has been afebrile for the last 48 hours. She has had a couple episodes daily of loose stools. No blood in the stool. No vomiting or significant abdominal pain but patient reports crampy abdomen and nausea. She has not wanted to eat or drink much. Patient reports she is fully vaccinated for COVID-19. No recent travel. No recent antibiotics. Willard Hoang MD)  This patient apparently had been feeling well for about 5 days and thought it was because her air conditioner had gone out. She has been staying with a family member. But she still felt like her pulse was going fast and her blood pressure was up. So she did come into the emergency room for evaluation.   Past Medical History:   Diagnosis Date    Anemia     Arrhythmia     Arthritis     Asthma     CKD (chronic kidney disease) stage 1, GFR 90 ml/min or greater     DM type 2 (diabetes mellitus, type 2) ([address]     HTN (hypertension)     Hypomagnesemia        Past Surgical History:   Procedure Laterality Date    BUNIONECTOMY      CARPAL TUNNEL RELEASE Left 03/2018    CARPAL TUNNEL RELEASE Right 10/2018    CHOLECYSTECTOMY      FINGER TRIGGER RELEASE Right     ring finger    HERNIA REPAIR  HYSTERECTOMY      TONSILLECTOMY AND ADENOIDECTOMY       Prior to Admission medications    Medication Sig Start Date End Date Taking?  Authorizing Provider   valsartan (DIOVAN) 320 MG tablet TAKE 1 TABLET BY MOUTH EVERY DAY 6/29/21  Yes Reed Burdick MD   allopurinol (ZYLOPRIM) 100 MG tablet TAKE 1 TABLET BY MOUTH EVERY DAY 7/6/20  Yes Historical Provider, MD   Multiple Vitamins-Minerals (THERAPEUTIC MULTIVITAMIN-MINERALS) tablet Take 1 tablet by mouth daily   Yes Historical Provider, MD   Biotin (BIOTIN 5000) 5 MG CAPS Take by mouth   Yes Historical Provider, MD   Insulin Glargine (BASAGLAR KWIKPEN SC) Inject 30 Units into the skin nightly   Yes Historical Provider, MD   metFORMIN (GLUCOPHAGE-XR) 500 MG extended release tablet TAKE 2 TABLETS BY MOUTH TWICE A DAY 7/20/19  Yes Historical Provider, MD   amLODIPine (NORVASC) 10 MG tablet Take 10 mg by mouth daily    Yes Historical Provider, MD   tiZANidine (ZANAFLEX) 4 MG tablet TAKE 1 TABLET BY MOUTH, AT BEDTIME DAILY 7/31/19  Yes Historical Provider, MD   Probiotic Product (ACIDOPHILUS PROBIOTIC) CAPS capsule Take 1 capsule by mouth daily   Yes Historical Provider, MD   aspirin 81 MG EC tablet Take 1 tablet by mouth daily 6/21/19  Yes Mt Narayan, DO   glimepiride (AMARYL) 4 MG tablet Take 1 tablet by mouth 2 times daily (with meals)  Patient taking differently: Take 8 mg by mouth every morning  6/21/19  Yes Mt Narayan DO   pravastatin (PRAVACHOL) 10 MG tablet Take 1 tablet by mouth nightly 6/21/19  Yes Mt Narayan DO   Metoprolol Tartrate 37.5 MG TABS Take 37.5 mg by mouth 2 times daily  Patient taking differently: Take 50 mg by mouth daily  6/21/19  Yes Mt Narayan DO   hydrochlorothiazide (HYDRODIURIL) 25 MG tablet Take 1 tablet by mouth daily  Patient taking differently: Take 50 mg by mouth daily  6/21/19  Yes Mt Narayan DO   calcium carbonate (OSCAL) 500 MG TABS tablet Take 1,200 mg by mouth daily Take 2 tabs daily    Yes Historical Provider, MD   diclofenac sodium 1 % GEL APPLY 2 GRAMS 4 TIMES DAILY AS DIRECTED 12/3/19   Historical Provider, MD   NONFORMULARY Circulation and vein support    Historical Provider, MD   Blood Glucose Monitoring Suppl (ONE TOUCH ULTRA 2) W/DEVICE KIT USE BID UTD 7/21/16   Historical Provider, MD     Allergies   Allergen Reactions    Iodine     Shellfish-Derived Products     Victoza [Liraglutide]      Abdominal pain       Social History     Socioeconomic History    Marital status: Single     Spouse name: Not on file    Number of children: Not on file    Years of education: Not on file    Highest education level: Not on file   Occupational History    Not on file   Tobacco Use    Smoking status: Never Smoker    Smokeless tobacco: Never Used   Substance and Sexual Activity    Alcohol use: No    Drug use: No    Sexual activity: Not on file   Other Topics Concern    Not on file   Social History Narrative    Not on file     Social Determinants of Health     Financial Resource Strain:     Difficulty of Paying Living Expenses:    Food Insecurity:     Worried About Running Out of Food in the Last Year:     [address] in the Last Year:    Transportation Needs:     Lack of Transportation (Medical):  Lack of Transportation (Non-Medical):    Physical Activity:     Days of Exercise per Week:     Minutes of Exercise per Session:    Stress:     Feeling of Stress :    Social Connections:     Frequency of Communication with Friends and Family:     Frequency of Social Gatherings with Friends and Family:     Attends Restorationist Services:     Active Member of Clubs or Organizations:     Attends Club or Organization Meetings:     Marital Status:    Intimate Partner Violence:     Fear of Current or Ex-Partner:     Emotionally Abused:     Physically Abused:     Sexually Abused:        History reviewed. No pertinent family history.     REVIEW OF SYSTEMS:  Review of Systems    Constitutional: Positive for activity change, appetite change and fatigue. Negative for chills and diaphoresis. HENT: Negative for congestion, sinus pain and tinnitus. Eyes: Negative. Respiratory: Negative for apnea, chest tightness and shortness of breath. Gastrointestinal: Positive for diarrhea and nausea. Negative for abdominal distention, constipation and vomiting. Genitourinary: Negative for difficulty urinating and frequency. Musculoskeletal: Negative for arthralgias and myalgias. Skin: Negative for color change and rash. Neurological: Positive for weakness. Negative for dizziness. Hematological: Negative. Psychiatric/Behavioral: Negative.     Nevaeh Hsu MD)  PHYSICAL EXAM:    BP (!) 118/56   Pulse 83   Temp 99.1 °F (37.3 °C)   Resp 20   Ht 5' (1.524 m)   Wt 229 lb (103.9 kg)   SpO2 94%   BMI 44.72 kg/m²     Physical Exam    Labs:        Lab Results   Component Value Date/Time    WBC 12.2 (H) 07/04/2021 04:28 AM    HGB 10.9 (L) 07/04/2021 04:28 AM    HCT 32.8 (L) 07/04/2021 04:28 AM     07/04/2021 04:28 AM    NEUTROABS 9.37 (H) 07/04/2021 04:28 AM     Lab Results   Component Value Date/Time     (L) 07/04/2021 04:28 AM    K 3.5 (L) 07/04/2021 04:28 AM    CL 99 07/04/2021 04:28 AM    CREATININE 2.90 (H) 07/04/2021 04:28 AM    BUN 63 (H) 07/04/2021 04:28 AM    GLUCOSE 159 (H) 07/04/2021 04:28 AM     Lab Results   Component Value Date/Time    LIPASE 10 (L) 07/03/2021 02:00 PM    ALT 30 07/03/2021 02:00 PM    AST 37 (H) 07/03/2021 02:00 PM     Lab Results   Component Value Date/Time    PROTIME 14.4 (H) 07/04/2021 04:28 AM    INR 1.1 07/04/2021 04:28 AM    APTT 31.0 07/03/2021 09:06 PM     Lab Results   Component Value Date/Time    COLORU YELLOW 07/03/2021 04:12 PM    SPECGRAV 1.025 07/03/2021 04:12 PM    KETUA TRACE (A) 07/03/2021 04:12 PM    HGBUR 2+ (A) 07/03/2021 04:12 PM    NITRU POSITIVE (A) 07/03/2021 04:12 PM    LEUKOCYTESUR MOD (A) No free fluid in pelvis. No pelvic adenopathy. Bladder is incompletely distended Peritoneum/Retroperitoneum: Atherosclerotic change seen in aorta. Tiny retroperitoneal nodes are seen. Bones/Soft Tissues: There is diastases of the rectus muscles. Tiny periumbilical hernia containing fat is seen     Left lower lobe pneumonia with small left-sided pleural effusion Scattered areas of colonic wall thickening are seen, either due to the partially contracted state of the colon or wall thickening from early colitis     XR CHEST PORTABLE    Result Date: 7/3/2021  EXAMINATION: ONE XRAY VIEW OF THE CHEST 7/3/2021 2:26 pm COMPARISON: Chest radiograph performed 06/18/2019. HISTORY: ORDERING SYSTEM PROVIDED HISTORY: chest pain TECHNOLOGIST PROVIDED HISTORY: chest pain Reason for Exam: Pt states she's feeling ill x 6 days. Fatigue, chest pain. AP UPRIGHT PORTABLE Acuity: Acute Type of Exam: Subsequent/Follow-up FINDINGS: There is no acute consolidation or effusion. There is no pneumothorax. The mediastinal structures are unremarkable. The upper abdomen is unremarkable. The extrathoracic soft tissues are unremarkable. There is no acute osseous abnormality. No acute cardiopulmonary process. IMPRESSION:      Active Hospital Problems    Diagnosis Date Noted    Pneumonia [J18.9] 07/03/2021    Type 2 diabetes mellitus, without long-term current use of insulin ([facility]) [E11.9] 06/17/2019    CKD (chronic kidney disease) stage 3, GFR 30-59 ml/min ([facility]) [N18.30] 03/21/2018           PLAN:   1. Go ahead and get a CT of the chest without IV dye just to better define where her infection is at. 2.  Continue antibiotics we will also start her on a heating pad for her back.   Current orders:  Orders Placed This Encounter   Procedures    Culture, Blood 1     Standing Status:   Standing     Number of Occurrences:   1    Culture, Blood 1     Standing Status:   Standing     Number of Occurrences:   1    XR CHEST PORTABLE Standing Status:   Standing     Number of Occurrences:   1     Order Specific Question:   Reason for exam:     Answer:   chest pain    CT ABDOMEN PELVIS WO CONTRAST Additional Contrast? None     Standing Status:   Standing     Number of Occurrences:   1     Order Specific Question:   Reason for exam:     Answer:   sepsis, unclear eitology persistent diarrhea/abd cramping     Order Specific Question:   Additional Contrast?     Answer:   None     Order Specific Question:   Decision Support Exception - unselect if not a suspected or confirmed emergency medical condition     Answer:   Emergency Medical Condition (MA) [1]    CBC Auto Differential     Standing Status:   Standing     Number of Occurrences:   1    Comprehensive Metabolic Panel w/ Reflex to MG     Standing Status:   Standing     Number of Occurrences:   1    Lactic Acid     Standing Status:   Standing     Number of Occurrences:   1    Troponin     Standing Status:   Standing     Number of Occurrences:   1    Urinalysis, reflex to microscopic     Standing Status:   Standing     Number of Occurrences:   1    Lipase     Standing Status:   Standing     Number of Occurrences:   1    Brain Natriuretic Peptide     Standing Status:   Standing     Number of Occurrences:   1    Lactic Acid     Standing Status:   Standing     Number of Occurrences:   1    Procalcitonin     Standing Status:   Standing     Number of Occurrences:   1    Microscopic Urinalysis     Standing Status:   Standing     Number of Occurrences:   1    Basic Metabolic Panel w/ Reflex to MG     Standing Status:   Standing     Number of Occurrences:   1    CBC auto differential     Standing Status:   Standing     Number of Occurrences:   9    Protime-INR     Standing Status:   Standing     Number of Occurrences:   1    Magnesium     Standing Status:   Standing     Number of Occurrences:   7    C-Reactive Protein     Standing Status:   Standing     Number of Occurrences:   21    SODIUM, URINE, RANDOM     Standing Status:   Standing     Number of Occurrences:   1    CREATININE, RANDOM URINE     Standing Status:   Standing     Number of Occurrences:   1    CK     Standing Status:   Standing     Number of Occurrences:   1    CBC     Standing Status:   Standing     Number of Occurrences:   1    CBC     Standing Status:   Standing     Number of Occurrences:   3    APTT     Standing Status:   Standing     Number of Occurrences:   1    Protime-INR     Standing Status:   Standing     Number of Occurrences:   1    Magnesium     Standing Status:   Standing     Number of Occurrences:   1    Anti-Xa Unfract Heparin     Standing Status:   Standing     Number of Occurrences:   1    Anti-Xa Unfract Heparin     Standing Status:   Standing     Number of Occurrences:   1    Troponin     Standing Status:   Standing     Number of Occurrences:   1    ADULT DIET; Regular; 3 carb choices (45 gm/meal)     Standing Status:   Standing     Number of Occurrences:   1     Order Specific Question:   Primary Diet     Answer:   Regular     Order Specific Question:   Carb Control Restriction     Answer:   3 carb choices (45 gm/meal)    Straight cath     Standing Status:   Standing     Number of Occurrences:   1    Vital signs per unit routine     Standing Status:   Standing     Number of Occurrences:   1    Notify physician     Notify physician for pulse less than 50 or greater than 120, respiratory rate less than 12 or greater than 25, oral temperature greater than 101.3 F (38.5 C) , urinary output less than 120 mL in four hours, systolic BP less than 90 or greater than 993, diastolic BP less than 50 or greater than 100.      Standing Status:   Standing     Number of Occurrences:   1    Up with assistance     Standing Status:   Standing     Number of Occurrences:   12229    Intake and output     Call for urine ouput less than 120ml in 4 hours     Standing Status:   Standing     Number of Occurrences:   12  Place intermittent pneumatic compression device     When appropriate prophylactic therapy cannot be provided due to patient limitations, serial screening Duplex Doppler Ultrasound studies of the legs may be considered to assess for development of DVT. Standing Status:   Standing     Number of Occurrences:   1    Telemetry monitoring - continuous duration     Standing Status:   Standing     Number of Occurrences:   1     Order Specific Question:   Patient may go off unit without monitor     Answer:   No    HYPOGLYCEMIA TREATMENT: blood glucose less than 50 mg/dL and patient  ALERT and TOLERATING PO     Give 8 ounces juice or regular soda or 2 tubes glucose gel. Repeat blood glucose in 15 minutes. If blood glucose is less than 70 mg/dL, repeat treatment and recheck blood glucose in 15 minutes x2 and notify provider. Standing Status:   Standing     Number of Occurrences:   [id number]    HYPOGLYCEMIA TREATMENT: blood glucose less than 70 mg/dL and patient ALERT and TOLERATING PO     Give 4 ounces juice or regular soda or 1 tube glucose gel. Repeat blood glucose in 15 minutes. If blood glucose is less than 70 mg/dL, repeat treatment and recheck blood glucose in 15 minutes x2. If blood glucose remains less than 70 mg/dL, notify provider. Standing Status:   Standing     Number of Occurrences:   [id number]    HYPOGLYCEMIA TREATMENT: blood glucose less than 70 mg/dL and patient NOT ALERT or NPO     Give dextrose 50% intravenous. If patient does not respond within 5 minutes, repeat dose x1. Start D5W at 100 mL/hour until ordering provider can be reached. Repeat blood glucose in 15 minutes. If blood glucose is less than 70 mg/dL, repeat treatment and recheck blood glucose in 15 minutes x2. Notify provider. If no intravenous access, administer glucagon 1 mg. After administration, attempt intravenous access and start D5W at 100 mL/hr. Repeat blood glucose in 15 minutes x2 and notify provider.      Standing Status: Standing     Number of Occurrences:   [id number]    Bladder scan     Standing Status:   Standing     Number of Occurrences:   1    Full Code     Standing Status:   Standing     Number of Occurrences:   1    Inpatient consult to Hospitalist     Standing Status:   Standing     Number of Occurrences:   1     Order Specific Question:   Reason for Consult? Answer:   Rasheeda Santos Inpatient consult to Dietitian     eval nutritional status     Standing Status:   Standing     Number of Occurrences:   1     Order Specific Question:   Reason for Consult? Answer:   Diet Education     Order Specific Question:   Specify Type of Diet Education:     Answer:   Diabetic diet    Inpatient consult to Social Work     Standing Status:   Standing     Number of Occurrences:   1     Order Specific Question:   Reason for Consult? Answer:   dc planning    Consult to Nephrology     Standing Status:   Standing     Number of Occurrences:   1     Order Specific Question:   Reason for Consult? Answer:   BRANDON     Order Specific Question:   Provider Contacted? Answer:   No    Inpatient consult to Nephrology     Standing Status:   Standing     Number of Occurrences:   1     Order Specific Question:   Reason for Consult? Answer:   eval    Inpatient consult to Cardiology     Standing Status:   Standing     Number of Occurrences:   1     Order Specific Question:   Reason for Consult? Answer:   a fib    OT eval and treat     Standing Status:   Standing     Number of Occurrences:   1    PT evaluation and treat     Standing Status:   Standing     Number of Occurrences:   1    Initiate Oxygen Therapy Protocol     Initiate oxygen therapy if the patient has 1) SpO2 is less than 90%, 2) Cyanosis, Chest Pain, Dyspnea, or Altered level of consciousness AND SpO2 checked and it is less than 90%, or 3) patient on Home oxygen.     To initiate oxygen therapy: nurse enters RT51 Nasal cannula oxygen order using Per Protocol order mode (if indication Home Oxygen then change L/min to same amount at home and change Wean to Room Air to No). Notify provider if initiate oxygen therapy unless already on Home Oxygen. Standing Status:   Standing     Number of Occurrences:   7    Incentive spirometry     Standing Status:   Standing     Number of Occurrences:   7    Pulse oximetry, continuous     Standing Status:   Standing     Number of Occurrences:   37    POCT Glucose     Repeat blood glucose 15 minutes following intervention. If blood glucose is less than 70 mg/dL, repeat treatment and recheck blood glucose in 15 minutes x2. If blood glucose remains less than 70 mg/dL, call ordering provider for further instruction. If patient experienced a hypoglycemic event in last 24 hours, obtain blood glucose at 0200.      Standing Status:   Standing     Number of Occurrences:   44636    POC Glucose Fingerstick     Standing Status:   Standing     Number of Occurrences:   1    POC Glucose Fingerstick     Standing Status:   Standing     Number of Occurrences:   1    POC Glucose Fingerstick     Standing Status:   Standing     Number of Occurrences:   1    EKG 12 Lead     Standing Status:   Standing     Number of Occurrences:   1     Order Specific Question:   Reason for Exam?     Answer:   Chest pain    Echocardiogram 2D W M-Mode     Standing Status:   Standing     Number of Occurrences:   1     Order Specific Question:   Reason for exam:     Answer:   afib    Insert peripheral IV     Standing Status:   Standing     Number of Occurrences:   1    PATIENT STATUS (FROM ED OR OR/PROCEDURAL) Inpatient     Standing Status:   Standing     Number of Occurrences:   1     Order Specific Question:   Patient Class     Answer:   Inpatient [101]     Order Specific Question:   REQUIRED: Diagnosis     Answer:   Pneumonia [841838]     Order Specific Question:   Estimated Length of Stay     Answer:   Estimated stay of more than 2 midnights     Order Specific

## 2021-07-04 NOTE — PROGRESS NOTES
Physical Therapy    Facility/Department: [facility]  Initial Assessment    NAME: Naveed Nunez  : 1950  MRN: [id number]    Date of Service: 2021    Discharge Recommendations:           Assessment   Assessment: No further PT intervention required at this time  Prognosis: Excellent  Decision Making: High Complexity  PT Education: General Safety  No Skilled PT: Independent with functional mobility   REQUIRES PT FOLLOW UP: No  Activity Tolerance  Activity Tolerance: Patient Tolerated treatment well       Patient Diagnosis(es): The primary encounter diagnosis was Pneumonia of left lung due to infectious organism, unspecified part of lung. Diagnoses of Acute cystitis with hematuria, BRANDON (acute kidney injury) ([facility] [address] and Pleural effusion were also pertinent to this visit. has a past medical history of Anemia, Arrhythmia, Arthritis, Asthma, CKD (chronic kidney disease) stage 1, GFR 90 ml/min or greater, DM type 2 (diabetes mellitus, type 2) ([facility] [address] HTN (hypertension), and Hypomagnesemia. has a past surgical history that includes Hysterectomy; Tonsillectomy and adenoidectomy; Bunionectomy; Cholecystectomy; hernia repair; Carpal tunnel release (Left, 2018); Carpal tunnel release (Right, 10/2018); and Finger trigger release (Right).     Restrictions  Restrictions/Precautions  Restrictions/Precautions: General Precautions  Vision/Hearing  Vision: Impaired  Vision Exceptions: Wears glasses for reading  Hearing: Within functional limits     Subjective  General  Chart Reviewed: Yes  Patient assessed for rehabilitation services?: Yes  Response To Previous Treatment: Not applicable  Family / Caregiver Present: No  Follows Commands: Within Functional Limits  Other (Comment): OK for PT per Bettina Deng RN  Pain Screening  Patient Currently in Pain: Yes  Pain Assessment  Pain Assessment: 0-10  Pain Level: 4  Pain Type: Chronic pain  Pain Location: Back  Pain Orientation: Lower  Vital Signs  Patient Currently in Pain: Yes       Orientation  Orientation  Overall Orientation Status: Within Normal Limits  Social/Functional History  Social/Functional History  Lives With: Alone  Type of Home: House  Home Layout: One level  Home Access: Stairs to enter with rails  Entrance Stairs - Number of Steps: 3  Entrance Stairs - Rails: Both  Bathroom Shower/Tub:  (Walk in tub)  Bathroom Toilet: Standard  Bathroom Equipment: Grab bars in shower, Shower chair  Bathroom Accessibility: Accessible  Home Equipment: Cane, Rolling walker  ADL Assistance: [address] [place] [address] Avenue: Independent  Homemaking Responsibilities: Yes  Ambulation Assistance: Independent  Transfer Assistance: Independent  Active : Yes  Occupation: Retired  Cognition   Cognition  Overall Cognitive Status: WNL    Objective     Observation/Palpation  Posture: Good    AROM RLE (degrees)  RLE AROM: WNL  AROM LLE (degrees)  LLE AROM : WNL  AROM RUE (degrees)  RUE AROM : WNL  AROM LUE (degrees)  LUE AROM : WNL  Strength RLE  Strength RLE: WFL  Comment: 4+/5 to 5/5 x 4 extremities  Strength LLE  Strength LLE: WFL  Strength RUE  Strength RUE: WFL  Tone RLE  RLE Tone: Normotonic  Tone LLE  LLE Tone: Normotonic  Motor Control  Gross Motor?: WNL  Sensation  Overall Sensation Status: WNL  Bed mobility  Comment: Pt in chair when PT arrived  Transfers  Sit to Stand: Independent  Stand to sit:  Independent  Stand Pivot Transfers: Independent  Ambulation  Ambulation?: Yes  Ambulation 1  Surface: level tile  Device: No Device  Assistance: Independent  Gait Deviations: Slow Kalli  Distance: 85' x 1  Comments: back to chair  Stairs/Curb  Stairs?: Yes  Stairs  # Steps : 3  Stairs Height: 6\"  Rails: Bilateral  Device: No Device  Assistance: Independent     Balance  Posture: Good  Sitting - Static: Good  Sitting - Dynamic: Good  Standing - Static: Good  Standing - Dynamic: Good        Plan   Plan  Times per week: No further PT intervention required at this time  Safety Devices  Type of devices: Left in chair, Chair alarm in place, Call light within reach, Gait belt (PCT Lyndsey notified)  Restraints  Initially in place: No    G-Code       OutComes Score                                                  AM-PAC Score             Goals  Short term goals  Time Frame for Short term goals: No further PT intervention required at this time  Patient Goals   Patient goals : Home today       Therapy Time   Individual Concurrent Group Co-treatment   Time In 1221         Time Out 1244         Minutes [address] [place] [address]

## 2021-07-04 NOTE — FLOWSHEET NOTE
07/03/21 2114   Provider Notification   Reason for Communication   (new consult/ Afib)   Provider Name Dr. Humphrey Thomason   Provider Notification Physician   Method of Communication Call   Response Waiting for response   Notification Time 2115

## 2021-07-04 NOTE — FLOWSHEET NOTE
07/03/21 2123   Provider Notification   Reason for Communication   (New consult)   Provider Name [facility]   Provider Notification Physician   Method of Communication Call   Response Waiting for response   Notification Time 2123

## 2021-07-04 NOTE — CARE COORDINATION
Case Management Initial Discharge Plan  Donna Elder,         Readmission Risk              Risk of Unplanned Readmission:  15             Met with:patient to discuss discharge plans. Information verified: address, contacts, phone number, , insurance Yes  PCP: Toya Salguero MD  Date of last visit: over a year ago    Insurance Provider: [address] [place]    Discharge Planning  Current Residence:  Private home  Living Arrangements:    lives alone  Home has 2 stories/3 with bilateral railings stairs to climb  Support Systems:  Spouse/Significant Other  Current Services PTA:  none Agency: none  Patient able to perform ADL's:Independent  DME in home:  Elevated toilet, walk in shower with seat, glucometer, cane, walker  DME used to aid ambulation prior to admission:   none  DME used during admission:  none    Potential Assistance Needed:  N/A    [facility] Medications:  No  Does patient want to participate in local refill/ meds to beds program?  No    Patient agreeable to home care: No  [place] of choice provided:  n/a      Type of Home Care Services:  None  Patient expects to be discharged to:  Private residence    Prior SNF/Rehab Placement and Facility: none  Agreeable to SNF/Rehab: No  [place] of choice provided: n/a   Evaluation: n/a    Expected Discharge date:  21  Follow Up Appointment: Best Day/ Time:      Transportation provider: damion  Transportation arrangements needed for discharge: No    Discharge Plan:   Met with patient to review plan of care. Lives alone, Indep, Drives. No services at home. Pt declines need for any services. New Afib and will d/c on Eliquis. Coupon for free month of Eliquis and pt assistance form for Eliquis given to pt as the cost for Eliquis can vary greatly when pt is in the donut hole.   Pt is indep and will check with her [facility] prescription plan as the cost for Eliquis will vary.        Electronically signed by Epifanio Lopez on 7/4/21 at 12:17 PM EDT

## 2021-07-04 NOTE — FLOWSHEET NOTE
07/03/21 2018   Provider Notification   Reason for Communication Patient Request- Notified of Admission   Provider Name Dr. Grisel Rivera   Provider Notification Physician   Method of Communication Call   Response Waiting for response   Notification Time 2019   Patient new onset of Afib RVR, writer is verifying with Physican if they want Cardiology consulted.

## 2021-07-05 LAB
ABSOLUTE EOS #: 0.15 K/UL (ref 0–0.44)
ABSOLUTE IMMATURE GRANULOCYTE: 0.14 K/UL (ref 0–0.3)
ABSOLUTE LYMPH #: 1.42 K/UL (ref 1.1–3.7)
ABSOLUTE MONO #: 0.76 K/UL (ref 0.1–1.2)
ANION GAP SERPL CALCULATED.3IONS-SCNC: 15 MMOL/L (ref 9–17)
ANTI-XA UNFRAC HEPARIN: 0.26 IU/L (ref 0.3–0.7)
ANTI-XA UNFRAC HEPARIN: 0.48 IU/L (ref 0.3–0.7)
ANTI-XA UNFRAC HEPARIN: 0.77 IU/L (ref 0.3–0.7)
BASOPHILS # BLD: 1 % (ref 0–2)
BASOPHILS ABSOLUTE: 0.04 K/UL (ref 0–0.2)
BUN BLDV-MCNC: 70 MG/DL (ref 8–23)
BUN/CREAT BLD: 28 (ref 9–20)
C-REACTIVE PROTEIN: 106.7 MG/L (ref 0–5)
CALCIUM SERPL-MCNC: 8.1 MG/DL (ref 8.6–10.4)
CHLORIDE BLD-SCNC: 100 MMOL/L (ref 98–107)
CO2: 19 MMOL/L (ref 20–31)
CREAT SERPL-MCNC: 2.49 MG/DL (ref 0.5–0.9)
DATE, STOOL #1: ABNORMAL
DATE, STOOL #2: ABNORMAL
DATE, STOOL #3: ABNORMAL
DIFFERENTIAL TYPE: ABNORMAL
EOSINOPHILS RELATIVE PERCENT: 2 % (ref 1–4)
GFR AFRICAN AMERICAN: 23 ML/MIN
GFR NON-AFRICAN AMERICAN: 19 ML/MIN
GFR SERPL CREATININE-BSD FRML MDRD: ABNORMAL ML/MIN/{1.73_M2}
GFR SERPL CREATININE-BSD FRML MDRD: ABNORMAL ML/MIN/{1.73_M2}
GLUCOSE BLD-MCNC: 217 MG/DL (ref 70–99)
GLUCOSE BLD-MCNC: 222 MG/DL (ref 65–105)
GLUCOSE BLD-MCNC: 307 MG/DL (ref 65–105)
GLUCOSE BLD-MCNC: 324 MG/DL (ref 65–105)
GLUCOSE BLD-MCNC: 358 MG/DL (ref 65–105)
GLUCOSE BLD-MCNC: 382 MG/DL (ref 65–105)
HCT VFR BLD CALC: 30.2 % (ref 36.3–47.1)
HEMOCCULT SP1 STL QL: POSITIVE
HEMOCCULT SP2 STL QL: ABNORMAL
HEMOCCULT SP3 STL QL: ABNORMAL
HEMOGLOBIN: 10.1 G/DL (ref 11.9–15.1)
IMMATURE GRANULOCYTES: 2 %
LYMPHOCYTES # BLD: 17 % (ref 24–43)
MAGNESIUM: 2 MG/DL (ref 1.6–2.6)
MCH RBC QN AUTO: 30 PG (ref 25.2–33.5)
MCHC RBC AUTO-ENTMCNC: 33.4 G/DL (ref 28.4–34.8)
MCV RBC AUTO: 89.6 FL (ref 82.6–102.9)
MONOCYTES # BLD: 9 % (ref 3–12)
NRBC AUTOMATED: 0 PER 100 WBC
PDW BLD-RTO: 13.2 % (ref 11.8–14.4)
PLATELET # BLD: 224 K/UL (ref 138–453)
PLATELET ESTIMATE: ABNORMAL
PMV BLD AUTO: 11.1 FL (ref 8.1–13.5)
POTASSIUM SERPL-SCNC: 3.4 MMOL/L (ref 3.7–5.3)
RBC # BLD: 3.37 M/UL (ref 3.95–5.11)
RBC # BLD: ABNORMAL 10*6/UL
SEG NEUTROPHILS: 69 % (ref 36–65)
SEGMENTED NEUTROPHILS ABSOLUTE COUNT: 5.71 K/UL (ref 1.5–8.1)
SODIUM BLD-SCNC: 134 MMOL/L (ref 135–144)
TIME, STOOL #1: 1045
TIME, STOOL #2: ABNORMAL
TIME, STOOL #3: ABNORMAL
WBC # BLD: 8.2 K/UL (ref 3.5–11.3)
WBC # BLD: ABNORMAL 10*3/UL

## 2021-07-05 PROCEDURE — 85025 COMPLETE CBC W/AUTO DIFF WBC: CPT

## 2021-07-05 PROCEDURE — 2580000003 HC RX 258: Performed by: FAMILY MEDICINE

## 2021-07-05 PROCEDURE — 83735 ASSAY OF MAGNESIUM: CPT

## 2021-07-05 PROCEDURE — 6370000000 HC RX 637 (ALT 250 FOR IP): Performed by: FAMILY MEDICINE

## 2021-07-05 PROCEDURE — 2500000003 HC RX 250 WO HCPCS: Performed by: EMERGENCY MEDICINE

## 2021-07-05 PROCEDURE — 80048 BASIC METABOLIC PNL TOTAL CA: CPT

## 2021-07-05 PROCEDURE — 97535 SELF CARE MNGMENT TRAINING: CPT

## 2021-07-05 PROCEDURE — 2060000000 HC ICU INTERMEDIATE R&B

## 2021-07-05 PROCEDURE — 2580000003 HC RX 258: Performed by: EMERGENCY MEDICINE

## 2021-07-05 PROCEDURE — 6370000000 HC RX 637 (ALT 250 FOR IP): Performed by: INTERNAL MEDICINE

## 2021-07-05 PROCEDURE — 6360000002 HC RX W HCPCS: Performed by: FAMILY MEDICINE

## 2021-07-05 PROCEDURE — 86140 C-REACTIVE PROTEIN: CPT

## 2021-07-05 PROCEDURE — 36415 COLL VENOUS BLD VENIPUNCTURE: CPT

## 2021-07-05 PROCEDURE — 82947 ASSAY GLUCOSE BLOOD QUANT: CPT

## 2021-07-05 PROCEDURE — 97166 OT EVAL MOD COMPLEX 45 MIN: CPT

## 2021-07-05 PROCEDURE — 85520 HEPARIN ASSAY: CPT

## 2021-07-05 RX ORDER — DILTIAZEM HYDROCHLORIDE 60 MG/1
60 TABLET, FILM COATED ORAL EVERY 12 HOURS SCHEDULED
Status: DISCONTINUED | OUTPATIENT
Start: 2021-07-05 | End: 2021-07-06

## 2021-07-05 RX ORDER — POTASSIUM CHLORIDE 20 MEQ/1
40 TABLET, EXTENDED RELEASE ORAL ONCE
Status: COMPLETED | OUTPATIENT
Start: 2021-07-05 | End: 2021-07-05

## 2021-07-05 RX ADMIN — AZITHROMYCIN MONOHYDRATE 500 MG: 500 INJECTION, POWDER, LYOPHILIZED, FOR SOLUTION INTRAVENOUS at 15:53

## 2021-07-05 RX ADMIN — ACETAMINOPHEN 650 MG: 325 TABLET ORAL at 22:25

## 2021-07-05 RX ADMIN — INSULIN LISPRO 10 UNITS: 100 INJECTION, SOLUTION INTRAVENOUS; SUBCUTANEOUS at 18:23

## 2021-07-05 RX ADMIN — METOPROLOL TARTRATE 75 MG: 50 TABLET, FILM COATED ORAL at 08:20

## 2021-07-05 RX ADMIN — HEPARIN SODIUM 13 UNITS/KG/HR: 10000 INJECTION, SOLUTION INTRAVENOUS at 03:24

## 2021-07-05 RX ADMIN — CEFTRIAXONE SODIUM 1000 MG: 1 INJECTION, POWDER, FOR SOLUTION INTRAMUSCULAR; INTRAVENOUS at 14:17

## 2021-07-05 RX ADMIN — DILTIAZEM HYDROCHLORIDE 60 MG: 60 TABLET, FILM COATED ORAL at 20:03

## 2021-07-05 RX ADMIN — METOPROLOL TARTRATE 75 MG: 50 TABLET, FILM COATED ORAL at 20:04

## 2021-07-05 RX ADMIN — ACETAMINOPHEN 650 MG: 325 TABLET ORAL at 08:20

## 2021-07-05 RX ADMIN — INSULIN LISPRO 8 UNITS: 100 INJECTION, SOLUTION INTRAVENOUS; SUBCUTANEOUS at 13:17

## 2021-07-05 RX ADMIN — INSULIN LISPRO 5 UNITS: 100 INJECTION, SOLUTION INTRAVENOUS; SUBCUTANEOUS at 20:03

## 2021-07-05 RX ADMIN — POTASSIUM CHLORIDE 40 MEQ: 20 TABLET, EXTENDED RELEASE ORAL at 14:17

## 2021-07-05 RX ADMIN — DILTIAZEM HYDROCHLORIDE 60 MG: 60 TABLET, FILM COATED ORAL at 08:21

## 2021-07-05 RX ADMIN — INSULIN LISPRO 4 UNITS: 100 INJECTION, SOLUTION INTRAVENOUS; SUBCUTANEOUS at 08:24

## 2021-07-05 RX ADMIN — HEPARIN SODIUM 4160 UNITS: 1000 INJECTION INTRAVENOUS; SUBCUTANEOUS at 06:25

## 2021-07-05 RX ADMIN — HEPARIN SODIUM 14 UNITS/KG/HR: 10000 INJECTION, SOLUTION INTRAVENOUS at 21:06

## 2021-07-05 RX ADMIN — ACETAMINOPHEN 650 MG: 325 TABLET ORAL at 00:03

## 2021-07-05 RX ADMIN — Medication: at 14:17

## 2021-07-05 RX ADMIN — Medication: at 00:03

## 2021-07-05 ASSESSMENT — PAIN DESCRIPTION - DESCRIPTORS: DESCRIPTORS: ACHING

## 2021-07-05 ASSESSMENT — PAIN SCALES - GENERAL
PAINLEVEL_OUTOF10: 0
PAINLEVEL_OUTOF10: 4
PAINLEVEL_OUTOF10: 2
PAINLEVEL_OUTOF10: 6
PAINLEVEL_OUTOF10: 0
PAINLEVEL_OUTOF10: 2
PAINLEVEL_OUTOF10: 0
PAINLEVEL_OUTOF10: 0
PAINLEVEL_OUTOF10: 2

## 2021-07-05 ASSESSMENT — PAIN DESCRIPTION - FREQUENCY: FREQUENCY: INTERMITTENT

## 2021-07-05 ASSESSMENT — PAIN DESCRIPTION - ORIENTATION
ORIENTATION: LOWER;MID
ORIENTATION: LOWER

## 2021-07-05 ASSESSMENT — PAIN DESCRIPTION - LOCATION
LOCATION: BACK
LOCATION: BACK

## 2021-07-05 ASSESSMENT — PAIN DESCRIPTION - PAIN TYPE
TYPE: CHRONIC PAIN
TYPE: CHRONIC PAIN

## 2021-07-05 NOTE — PROGRESS NOTES
Occupational Therapy   Occupational Therapy Initial Assessment  Date: 2021   Patient Name: Oly Kaplan  MRN: [id number]     : 1950    Date of Service: 2021     RN Ousmane Carolina reports patient is medically stable for therapy treatment this date. Chart reviewed prior to treatment and patient is agreeable for therapy. All lines intact and patient positioned comfortably at end of treatment. All patient needs addressed prior to ending therapy session. Discharge Recommendations:  Patient would benefit from continued therapy after discharge       Assessment   Performance deficits / Impairments: Decreased functional mobility ; Decreased safe awareness;Decreased balance;Decreased posture;Decreased ADL status; Decreased endurance;Decreased high-level IADLs;Decreased strength  Assessment: Skilled OT is indicated to increase overall I and safety in function as well as improve endurance for ADL completion to return home at prior level of functioning. 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 Pt also edu on call light use, fall prevention, recommendations for future therapy, benefits of being up OOB, and pursed lip breathing. Activity Tolerance  Activity Tolerance: Patient Tolerated treatment well;Patient limited by fatigue  Activity Tolerance: fair  Safety Devices  Safety Devices in place: Yes  Type of devices: All fall risk precautions in place; Left in chair;Call light within reach;Nurse notified;Gait belt           Patient Diagnosis(es): The primary encounter diagnosis was Pneumonia of left lung due to infectious organism, unspecified part of lung.  Diagnoses of Acute cystitis with hematuria, BRANDON (acute kidney injury) ([facility] [address] and Pleural effusion were also pertinent to this visit. has a past medical history of Anemia, Arrhythmia, Arthritis, Asthma, CKD (chronic kidney disease) stage 1, GFR 90 ml/min or greater, DM type 2 (diabetes mellitus, type 2) ([address] HTN (hypertension), and Hypomagnesemia. has a past surgical history that includes Hysterectomy; Tonsillectomy and adenoidectomy; Bunionectomy; Cholecystectomy; hernia repair; Carpal tunnel release (Left, 03/2018); Carpal tunnel release (Right, 10/2018); and Finger trigger release (Right). Per H&P: Seen in signout from Dr. Candice Mitchell. Here with decreased appetite generalized weakness. History of hypertension CKD diabetes. Work-up here shows BRANDON creatinine up to 2.95, white count of 14, normal lactic acid, CT concerning for left lower lobe pneumonia with pleural effusion and UTI as well and urine. Rocephin Zithromax given as well as fluids. Plan for admission.       Restrictions  Restrictions/Precautions  Restrictions/Precautions: General Precautions  Required Braces or Orthoses?: No  Position Activity Restriction  Other position/activity restrictions: up with assist, RUE IV, carb choice diet    Subjective   General  Chart Reviewed: Yes  Patient assessed for rehabilitation services?: Yes  Family / Caregiver Present: No  Patient Currently in Pain: Yes  Pain Assessment  Pain Assessment: 0-10  Pain Level: 4  Pain Type: Chronic pain  Pain Location: Back  Pain Orientation: Lower;Mid  Vital Signs  Level of Consciousness: Alert (0)  Patient Currently in Pain: Yes     Social/Functional History  Social/Functional History  Lives With: Alone  Type of Home: House  Home Layout: One level  Home Access: Stairs to enter with rails  Entrance Stairs - Number of Steps: 3  Entrance Stairs - Rails: Both  Bathroom Shower/Tub: Walk-in shower (Walk in tub)  Bathroom Toilet: Handicap height  Bathroom Equipment: Grab bars in shower, Shower chair  Bathroom Accessibility: Accessible  Home Equipment: [place], [employer] cueing/tactile assist for B hand placement to push up/reach back to sit, and line mgt. Cognition  Overall Cognitive Status: Exceptions  Arousal/Alertness: Appropriate responses to stimuli  Following Commands: Follows all commands without difficulty  Attention Span: Appears intact  Memory: Appears intact  Safety Judgement: Decreased awareness of need for assistance;Decreased awareness of need for safety  Problem Solving: Able to problem solve independently  Insights: Decreased awareness of deficits  Initiation: Does not require cues  Sequencing: Does not require cues  Perception  Overall Perceptual Status: WFL     Sensation  Overall Sensation Status: WFL        LUE AROM (degrees)  LUE AROM : WFL  RUE AROM (degrees)  RUE AROM : WFL  LUE Strength  Gross LUE Strength: WFL  RUE Strength  Gross RUE Strength: WFL  RUE Strength Comment: BUE grossly tested 4/5                   Plan   Plan  Times per week: 4-5x/week, 1x/day as jonathan  Current Treatment Recommendations: Strengthening, Pain Management, Positioning, Safety Education & Training, Balance Training, Patient/Caregiver Education & Training, Self-Care / ADL, Neuromuscular Re-education, Functional Mobility Training, Endurance Training, Home Management Training                        AM-PAC Score        -[facility] Inpatient Daily Activity Raw Score: 19 (07/05/21 0941)  AM-PAC Inpatient ADL T-Scale Score : 40.22 (07/05/21 0941)  ADL Inpatient CMS 0-100% Score: 42.8 (07/05/21 0941)  ADL Inpatient CMS G-Code Modifier : CK (07/05/21 0941)    Goals  Short term goals  Time Frame for Short term goals: by discharge, pt to demo  Short term goal 1: ADL transfers/functional mob to mod I/I with use of AD as needed to reduce risk of falls. Short term goal 2: increase BUE strength by 1/2 grade and be I with HEP with use of handouts as needed for ADL completion. Short term goal 3: All ADLs to mod I/I with use of AE/AD as needed. Short term goal 4: bed mob to mod I/I.   Short term goal 5: standing jonathan to 10 min with SUP and use of AD as needed to increase overall safety for ADL completion. Long term goals  Long term goal 1: Pt/caregiver to demo understanding of EC/WS tech and fall prevention with use of handouts. Patient Goals   Patient goals : To return home and be with her dog       Therapy Time   Individual Concurrent Group Co-treatment   Time In 0827         Time Out 0850 (+ 10 min chart review/RN communication)         Minutes 23+10=33min         tx time: 23 min       Written and verbal edu provided to pt on safe ADL completion techniques and tips during bathing/showering, and toileting; EC/WS techniques of pacing, posturing, body mechanics, planning and organizing tasks, avoiding fatigue, and task simplification in regards to ADLs of eating, grooming, bathing/showering, dressing, and IADLs of cooking, meal cleanup, marketing and meal planning, laundry, bed making, and housework.           Nataliya Walters

## 2021-07-05 NOTE — PROGRESS NOTES
Nephrology Progress Note    Subjective/   66y.o. year old female who we are seeing in consultation for acute kidney injury. Interval history:  Patient feels better. She has improving appetite no diarrhea or fever or nausea. Urine output is improving with 2.5 L produced yesterday  Patient is hemodynamically stable with controlled heart rate. History of Present Illness: This is a 66 y.o. female with past medical history chronic kidney disease 3 a with baseline creatinine of 1.2-1.4 mg/dL, type 2 diabetes, essential hypertension. Patient presented to the hospital with complaints of lethargy chills not feeling good poor oral intake and decrease in appetite for about 1 week nausea no vomiting. Patient did have few loose stools, patient also complained of cough and saw some streaks of blood twice  Patient also noted tachycardia,notedto be Afib. CT abdomen and pelvis showed left lower lobe pneumonia with small left-sided pleural effusion, scattered areas of colonic wall thickening. Pt denies any history of  prolonged NSAID use. Patient denies dysuria, gross hematuria, flank pain, nocturia, urgency, passing frothy urine or urinary incontinence. There has been no recent exposure to IV contrast.There is no history  of paraprotein disease. Pt denies any history of recurrent UTI or kidney stones. Medication review shows use of ARB's, hydrochlorothiazide, Metformin  Blood pressure stable no hypotension noted.         Objective/     Vitals:    07/04/21 1931 07/04/21 2002 07/04/21 2339 07/05/21 0724   BP: 117/65  113/70 133/74   Pulse: (!) 36 105 85 87   Resp: 16  16 16   Temp: 98.1 °F (36.7 °C)  97.5 °F (36.4 °C) 98.4 °F (36.9 °C)   TempSrc: Oral  Axillary Oral   SpO2: 97%  97% 95%   Weight:       Height:         24HR INTAKE/OUTPUT:      Intake/Output Summary (Last 24 hours) at 7/5/2021 0821  Last data filed at 7/5/2021 0007  Gross per 24 hour   Intake 2748 ml   Output 1600 ml   Net 1148 ml     Patient Vitals for the past 96 hrs (Last 3 readings):   Weight   07/03/21 1327 229 lb (103.9 kg)       Constitutional:  Alert, awake, no apparent distress  Cardiovascular:  S1, S2 without m/r/g  Respiratory: Crackles left lower lung base otherwise lungs are clear  Abdomen: +bs, soft, nt  Ext:  LE edema    Data/  Recent Labs     07/03/21  2106 07/04/21  0428 07/05/21  0515   WBC 12.5* 12.2* 8.2   HGB 10.1* 10.9* 10.1*   HCT 30.6* 32.8* 30.2*   MCV 90.5 90.6 89.6    226 224     Recent Labs     07/03/21  1400 07/03/21  2106 07/04/21  0428 07/05/21  0515   *  --  134*  --    K 3.8  --  3.5*  --    CL 97*  --  99  --    CO2 13*  --  18*  --    GLUCOSE 275*  --  159*  --    MG  --  2.0 2.0 2.0   BUN 67*  --  63*  --    CREATININE 2.95*  --  2.90*  --    LABGLOM 16*  --  16*  --    GFRAA 19*  --  19*  --          Assessment/   1. Acute kidney injury, nonoliguric on CKD 3 most likely secondary to prerenal azotemia, from poor oral intake use of ARB and diuretics and hemodynamic related to A. fib with RVR-renal function is slowly improving-serum creatinine 2.49 mg/dL    2. Type 2 diabetes    3. Essential hypertension-controlled    4. CKD stage III with baseline creatinine of 1.2 to 1.4 mg/dL most likely secondary to diabetic nephropathy    5. Urinary tract infection with pyuria    6. CT finding of suspected lower lobe pneumonia-on IV antibiotics       7. Hypokalemia      8. gap metabolic acidosis secondary to BRANDON-improving  Plan/   Continue sodium bicarbonate 75 meq + 0.45 saline at 75 cc/h  Give 40 meq p.o. of potassium Cl. Strict I's and O's  Continue IV azithromycin and Rocephin  Follow the urine cultures and sensitivity  Avoid nephrotoxic drugs. Patient on heparin. Jazmín Corbin M.D, DIANA  Nephrologist

## 2021-07-05 NOTE — PROGRESS NOTES
[facility]   Progress Note                   Date:   7/5/2021  Patient name: Celso Calderon  Date of admission:  7/3/2021  1:37 PM  MRN:   [id number]  YOB: 1950  PCP: Lord Danielito MD    Reason for Admission:     Subjective:       Clinical Changes / Abnormalities: no cp, sob, orthopnea, pnd.   HR still tachy/af with RVR      Medications:   Scheduled Meds:   metoprolol tartrate  50 mg Oral BID    dilTIAZem  30 mg Oral 2 times per day    sodium chloride flush  5-40 mL Intravenous 2 times per day    cefTRIAXone (ROCEPHIN) IV  1,000 mg Intravenous Q24H    azithromycin  500 mg Intravenous Q24H    insulin lispro  0-12 Units Subcutaneous TID WC    insulin lispro  0-6 Units Subcutaneous Nightly     Continuous Infusions:   sodium chloride      dextrose      sodium bicarbonate infusion 75 mL/hr at 07/05/21 0003    heparin (PORCINE) Infusion 15 Units/kg/hr (07/05/21 0624)     CBC:   Recent Labs     07/03/21 2106 07/04/21 0428 07/05/21  0515   WBC 12.5* 12.2* 8.2   HGB 10.1* 10.9* 10.1*    226 224     BMP:    Recent Labs     07/03/21  1400 07/04/21  0428   * 134*   K 3.8 3.5*   CL 97* 99   CO2 13* 18*   BUN 67* 63*   CREATININE 2.95* 2.90*   GLUCOSE 275* 159*     Hepatic:   Recent Labs     07/03/21  1400   AST 37*   ALT 30   BILITOT 0.38   ALKPHOS 78     Troponin:   Recent Labs     07/03/21  1400 07/04/21  0428   TROPHS 47* 41*     BNP: No results for input(s): BNP in the last 72 hours. Lipids: No results for input(s): CHOL, HDL in the last 72 hours. Invalid input(s): LDLCALCU  INR:   Recent Labs     07/03/21 2106 07/04/21 0428   INR 1.2 1.1       Objective:   Vitals: /70   Pulse 85   Temp 97.5 °F (36.4 °C) (Axillary)   Resp 16   Ht 5' (1.524 m)   Wt 229 lb (103.9 kg)   SpO2 97%   BMI 44.72 kg/m²   General appearance: alert and cooperative with exam  HEENT: Head: Normocephalic, no lesions, without obvious abnormality.   Neck: no adenopathy, no carotid bruit, no JVD, supple, symmetrical, trachea midline and thyroid not enlarged, symmetric, no tenderness/mass/nodules  Lungs: clear to auscultation bilaterally  Heart: regular rate and rhythm, S1, S2 normal, no murmur, click, rub or gallop  Abdomen: soft, non-tender; bowel sounds normal; no masses,  no organomegaly  Extremities: extremities normal, atraumatic, no cyanosis or edema  Neurologic: Mental status: Alert, oriented, thought content appropriate    EKG:   Results for orders placed or performed during the hospital encounter of 07/03/21   EKG 12 Lead   Result Value Ref Range    Ventricular Rate 118 BPM    Atrial Rate 118 BPM    QRS Duration 98 ms    Q-T Interval 284 ms    QTc Calculation (Bazett) 398 ms    R Axis -43 degrees    T Axis 67 degrees    Narrative    Atrial fibrillation with rapid ventricular response  Left axis deviation  Abnormal ECG  When compared with ECG of 17-JUN-2019 05:35,  Atrial fibrillation has replaced Sinus rhythm  Vent. rate has increased BY  49 BPM       Assessment / Acute Cardiac Problems:     - Afib with RVR- new onset- still with mild RVR  - Elevated troponin due to BRANDON on CKD  - PNA  - BRANDON on CKD  - HTN  - DL  - DM2    Plan of Treatment:     - increase lopressor 75 mg bid  - increase cardizem 60 mg bid  - heparin drip  - 2d Echo- P  - on IVF and ABX per primary team  - will need eliquis on d/c     D/w pt and nurse    Thank you for allowing me to participate in the care of this patient, please do not hesitate to call if you have any questions. Kojo De Los Santos DO, [facility], [address], [place] [address] [facility]  [email or web address]  [phone]

## 2021-07-05 NOTE — CARE COORDINATION
Discharge planning    Patient seen by cardiology and notes eliquis for discharge. At this time patient is on heparin gtt. Nephrology is following for BRANDON with creatine of 2.49. call to patient pharmacy [facility] at [phone] and  5 mg bid will cost 47 per month. No PA needed but did not fill, just placed on profile until  confirm final dosage with nephrology. Nephrology spoke with [facility] RN and confirmed that 2.5 mg would be the desired dose.  Call back to [facility] and  to remove eliquis 5 mg off the profile

## 2021-07-05 NOTE — PROGRESS NOTES
Progress Note(Hospital)    [facility]     Admition Status: Inpatient [101]     CC:Anorexia (No appetite for one week along with nausea ) and Fatigue (Pt reports generalized weakness for a couple of days )    HCC:  Patient states she feels a lot better today. We did review her results with her today. Denies any chest pain she does get short of breath laying on her left side.           Current Facility-Administered Medications:     dilTIAZem (CARDIZEM) tablet 60 mg, 60 mg, Oral, 2 times per day, Fabio Igor, DO, 60 mg at 07/05/21 0821    metoprolol tartrate (LOPRESSOR) tablet 75 mg, 75 mg, Oral, BID, Fabio Igor, DO, 75 mg at 07/05/21 0820    sodium chloride flush 0.9 % injection 5-40 mL, 5-40 mL, Intravenous, 2 times per day, Mirian Montenegroauskas, DO, 10 mL at 07/03/21 2212    sodium chloride flush 0.9 % injection 10 mL, 10 mL, Intravenous, PRN, Mirian Montenegroauskas, DO    0.9 % sodium chloride infusion, 25 mL, Intravenous, PRN, Mirian Roberts Neverauskas, DO    ondansetron (ZOFRAN-ODT) disintegrating tablet 4 mg, 4 mg, Oral, Q8H PRN **OR** ondansetron (ZOFRAN) injection 4 mg, 4 mg, Intravenous, Q6H PRN, Miriankelly Montenegroauskas, DO    polyethylene glycol (GLYCOLAX) packet 17 g, 17 g, Oral, Daily PRN, Mirian Montenegroauskas, DO    acetaminophen (TYLENOL) tablet 650 mg, 650 mg, Oral, Q6H PRN, 650 mg at 07/05/21 0820 **OR** acetaminophen (TYLENOL) suppository 650 mg, 650 mg, Rectal, Q6H PRN, Mirian Montenegroauskas, DO    cefTRIAXone (ROCEPHIN) 1000 mg IVPB in 50 mL D5W minibag, 1,000 mg, Intravenous, Q24H, Mt Narayan DO, Stopped at 07/04/21 1632    azithromycin (ZITHROMAX) 500 mg in D5W 250ml addavial, 500 mg, Intravenous, Q24H, Mt Narayan DO, Stopped at 07/04/21 1924    insulin lispro (HUMALOG) injection vial 0-12 Units, 0-12 Units, Subcutaneous, TID WC, Mirian Narayan DO, 4 Units at 07/05/21 0824    insulin lispro (HUMALOG) injection vial 0-6 Units, 0-6 Units, Subcutaneous, Nightly, Bettye Narayan DO, 4 Units at 07/04/21 2006    glucose (GLUTOSE) 40 % oral gel 15 g, 15 g, Oral, PRN, Bettye Narayan, DO    dextrose 50 % IV solution, 12.5 g, Intravenous, PRN, Bettye Narayan, DO    glucagon (rDNA) injection 1 mg, 1 mg, Intramuscular, PRN, Bettye Narayan, DO    dextrose 5 % solution, 100 mL/hr, Intravenous, PRN, Bettye Duboses, DO    sodium bicarbonate 75 mEq in sodium chloride 0.45 % 1,000 mL infusion, , Intravenous, Continuous, Shoshana Mtz MD, Last Rate: 75 mL/hr at 07/05/21 0003, New Bag at 07/05/21 0003    heparin (porcine) injection 8,310 Units, 80 Units/kg, Intravenous, PRN, Bettye Narayan,     heparin (porcine) injection 4,160 Units, 40 Units/kg, Intravenous, PRN, Bettye Narayan DO, 4,160 Units at 07/05/21 0625    heparin 25,000 units in dextrose 5% 250 mL (premix) infusion, 5-30 Units/kg/hr, Intravenous, Continuous, Bettye Narayan DO, Last Rate: 15.6 mL/hr at 07/05/21 0624, 15 Units/kg/hr at 07/05/21 0624    metoprolol (LOPRESSOR) injection 5 mg, 5 mg, Intravenous, Q6H PRN, Fabio Costa DO    O:  /86   Pulse 81   Temp 98.2 °F (36.8 °C) (Oral)   Resp 16   Ht 5' (1.524 m)   Wt 229 lb (103.9 kg)   SpO2 95%   BMI 44.72 kg/m²     Intake/Output Summary (Last 24 hours) at 7/5/2021 1139  Last data filed at 7/5/2021 0827  Gross per 24 hour   Intake 2748 ml   Output 2700 ml   Net 48 ml       Physical Exam  Constitutional:       General: She is not in acute distress. Appearance: She is well-developed. HENT:      Head: Normocephalic and atraumatic. Right Ear: Tympanic membrane normal.      Left Ear: Tympanic membrane normal.      Nose: Nose normal.      Mouth/Throat:      Mouth: Mucous membranes are moist.   Neck:      Vascular: No JVD. Cardiovascular:      Rate and Rhythm: Normal rate and regular rhythm. Heart sounds: No murmur heard. No friction rub.    Pulmonary: Effort: Pulmonary effort is normal. No respiratory distress. Breath sounds: No stridor. Rhonchi (lft) present. Abdominal:      General: Bowel sounds are normal.      Palpations: Abdomen is soft. Genitourinary:     Comments: No Examined  Skin:     General: Skin is warm and dry. Neurological:      General: No focal deficit present. Mental Status: She is oriented to person, place, and time. Psychiatric:         Mood and Affect: Mood normal.         Behavior: Behavior normal.         Thought Content: Thought content normal.         Judgment: Judgment normal.         Labs:      Lab Results   Component Value Date/Time    WBC 8.2 07/05/2021 05:15 AM    HGB 10.1 (L) 07/05/2021 05:15 AM    HCT 30.2 (L) 07/05/2021 05:15 AM     07/05/2021 05:15 AM    NEUTROABS 5.71 07/05/2021 05:15 AM     Lab Results   Component Value Date/Time     (L) 07/05/2021 05:15 AM    K 3.4 (L) 07/05/2021 05:15 AM     07/05/2021 05:15 AM    CREATININE 2.49 (H) 07/05/2021 05:15 AM    BUN 70 (H) 07/05/2021 05:15 AM    GLUCOSE 217 (H) 07/05/2021 05:15 AM     Lab Results   Component Value Date    PROBNP 13,599 07/03/2021    PROBNP 919 06/21/2019    PROBNP 1,105 06/20/2019     Lab Results   Component Value Date    .1 07/04/2021     Lab Results   Component Value Date    INR 1.1 07/04/2021     Recent Labs     07/03/21  1400   PROCAL 1.86*         Rad:    CT ABDOMEN PELVIS WO CONTRAST Additional Contrast? None    Result Date: 7/3/2021  EXAMINATION: CT OF THE ABDOMEN AND PELVIS WITHOUT CONTRAST 7/3/2021 3:05 pm TECHNIQUE: CT of the abdomen and pelvis was performed without the administration of intravenous contrast. Multiplanar reformatted images are provided for review. Dose modulation, iterative reconstruction, and/or weight based adjustment of the mA/kV was utilized to reduce the radiation dose to as low as reasonably achievable. COMPARISON: None.  HISTORY: ORDERING SYSTEM PROVIDED HISTORY: sepsis, unclear eitology persistent diarrhea/abd cramping TECHNOLOGIST PROVIDED HISTORY: sepsis, unclear eitology persistent diarrhea/abd cramping Decision Support Exception - unselect if not a suspected or confirmed emergency medical condition->Emergency Medical Condition (MA) Reason for Exam: Sepsis. Acuity: Acute Type of Exam: Initial FINDINGS: Lower Chest: Patchy consolidative changes seen in the left lower lobe. Tiny left-sided pleural effusion is seen Organs: Adrenal glands appear normal. No splenomegaly. No perisplenic fluid There is a subtle lobular contour to the liver. There are clips from prior cholecystectomy No peripancreatic fluid. No peripancreatic inflammatory change No stones or hydronephrosis on the right. No stones or hydronephrosis on the left No peripancreatic fluid. No peripancreatic inflammatory change. Trace left-sided pleural effusion is seen GI/Bowel: No significant small bowel distention noted. Mild stool load seen in the colon. Scattered colonic diverticula are seen. Colon is incompletely distended, accentuating its wall thickness. No significant pericolonic stranding Pelvis: No free fluid in pelvis. No pelvic adenopathy. Bladder is incompletely distended Peritoneum/Retroperitoneum: Atherosclerotic change seen in aorta. Tiny retroperitoneal nodes are seen. Bones/Soft Tissues: There is diastases of the rectus muscles. Tiny periumbilical hernia containing fat is seen     Left lower lobe pneumonia with small left-sided pleural effusion Scattered areas of colonic wall thickening are seen, either due to the partially contracted state of the colon or wall thickening from early colitis     CT CHEST WO CONTRAST    Result Date: 7/4/2021  EXAMINATION: CT OF THE CHEST WITHOUT CONTRAST 7/4/2021 12:15 pm TECHNIQUE: CT of the chest was performed without the administration of intravenous contrast. Multiplanar reformatted images are provided for review.  Dose modulation, iterative reconstruction, and/or weight based adjustment of the mA/kV was utilized to reduce the radiation dose to as low as reasonably achievable. COMPARISON: None. HISTORY: ORDERING SYSTEM PROVIDED HISTORY: Pneumonia TECHNOLOGIST PROVIDED HISTORY: NO IV CONTRAST Pneumonia Reason for Exam: Pneumonia seen on prior CT Scan. Acuity: Acute Type of Exam: Initial FINDINGS: Mediastinum: Calcifications are seen in the thyroid. A few hypodense nodules are seen measuring 9 mm in size or less, for which no specific imaging follow-up is recommended based on size Small mediastinal and hilar nodes are noted. Mild coronary artery calcification is seen. Aortic valve calcification is seen. Trace pericardial fluid is seen. Small hiatal hernia seen. There is nonspecific thickening at the GE junction Lungs/pleura: Bandlike opacity seen in the right middle lobe. Bandlike opacity seen in the right lower lobe. No focal consolidation is seen in the right lung. Mild traction bronchiectasis is seen in the right middle lobe. Patchy consolidation is seen in the left lower lobe. Small left-sided pleural effusion is seen. There is adjacent left basilar opacity. Upper Abdomen: Right adrenal gland is normal.  Left adrenal gland is normal. Atherosclerotic change seen in abdominal aorta. There is subtle lobular contour to the liver. Mild stool load is seen in the colon. .  A few scattered colonic diverticula are seen. Soft Tissues/Bones: Spurring is seen in the spine. Spurring is seen in the shoulder joints. Left lower lobe pneumonia with small left-sided pleural effusion. No focal consolidation on the right. XR CHEST PORTABLE    Result Date: 7/3/2021  EXAMINATION: ONE XRAY VIEW OF THE CHEST 7/3/2021 2:26 pm COMPARISON: Chest radiograph performed 06/18/2019. HISTORY: ORDERING SYSTEM PROVIDED HISTORY: chest pain TECHNOLOGIST PROVIDED HISTORY: chest pain Reason for Exam: Pt states she's feeling ill x 6 days. Fatigue, chest pain.  AP UPRIGHT PORTABLE Acuity: Acute Type of Exam: Subsequent/Follow-up FINDINGS: There is no acute consolidation or effusion. There is no pneumothorax. The mediastinal structures are unremarkable. The upper abdomen is unremarkable. The extrathoracic soft tissues are unremarkable. There is no acute osseous abnormality. No acute cardiopulmonary process. A:  Active Hospital Problems    Diagnosis Date Noted    Pneumonia [J18.9] 07/03/2021    Type 2 diabetes mellitus, without long-term current use of insulin (HCC) [E11.9] 06/17/2019    CKD (chronic kidney disease) stage 3, GFR 30-59 ml/min ([facility]) [N18.30] 03/21/2018           P:  1. We will continue antibiotics currently. Her white count seems to be improving. 2.  Sugars are little bit on the higher side we will go ahead and modify her Lantus. Ly Narayan DO  11:39 AM  7/5/2021    This note was created with the assistance of a speech-recognition program. Although the intention is to generate a document that actually reflects the content of the visit, no guarantees can be provided that every mistake has been identified and corrected by editing.

## 2021-07-05 NOTE — PROGRESS NOTES
Nutrition Assessment     Type and Reason for Visit: Initial, Positive Nutrition Screen (Nausea and vomiting)    Nutrition Recommendations/Plan:   1. Continue current Regular; 3 carb choice (45 gm/meal)  2. Carbohydrate Counting and diabetes education provided     Nutrition Assessment:  Patient admitted for pneumonia. Patient reported a decreased appetite/PO intake x 1 week and reported fatigue. PMH: CKD stage 3, dM, HTN, and new onset A-fib. Patient currently reports appetite improving and no nausea/vomiting. PO intake 100% breakfast and 75% of lunch. Consult for diabetes education. Education provided. Patient very knowledgeable on K+ and Phos for kidneys and counting carbohydrates daily. She reports usually consuming ~6 carb choices per day. Reminded patient goal of 9 carb choices per day with consistency throughout the day (.e: 3 carb chocies per meal). Contact information available for questions/concerns. Will continue current diet and monitor need for ONS. Will monitor PO intakes and weights/labs. Patient is at low nutritional risk at this time. Malnutrition Assessment:  Malnutrition Status: Insufficient data    Nutrition Related Findings:  (H). Active bowel sounds. BLE +1 edema. Current Nutrition Therapies:    ADULT DIET;  Regular; 3 carb choices (45 gm/meal)    Anthropometric Measures:  · Height: 5' (152.4 cm)  · Current Body Wt: 229 lb (103.9 kg)   · BMI: 44.7    Nutrition Diagnosis:   · Altered nutrition-related lab values related to endocrine dysfuntion as evidenced by lab values    Nutrition Interventions:   Food and/or Nutrient Delivery:  Continue Current Diet  Nutrition Education/Counseling:  Education completed   Coordination of Nutrition Care:  Continue to monitor while inpatient    Goals:  PO intakes to provide > 75% of estimated nutrition needs       Nutrition Monitoring and Evaluation:   Behavioral-Environmental Outcomes:  None Identified   Food/Nutrient Intake Outcomes:  Food and Nutrient Intake  Physical Signs/Symptoms Outcomes:  Biochemical Data, Nausea or Vomiting, Weight     Discharge Planning:    Continue current diet     Fermin Arriola, [address] [place] [address],   Office Number: [phone]

## 2021-07-05 NOTE — FLOWSHEET NOTE
Patient was anointed by Fr. Doreen Winston for the sacrament of the sick. 07/05/21 1031   Encounter Summary   Services provided to: Patient   Continue Visiting   (7-5-21 PT: anointed)   Complexity of Encounter Low   Routine   Type Follow up   Sacraments   Sacrament of Sick-Anointing Anointed  (7-5-21 by FR. Doreen Winston)

## 2021-07-05 NOTE — FLOWSHEET NOTE
Dr Lucy Galindo paged, re: reference to patient receiving supplemental potassium noted in notation, no order found

## 2021-07-06 LAB
ABSOLUTE EOS #: 0.33 K/UL (ref 0–0.44)
ABSOLUTE IMMATURE GRANULOCYTE: 0.56 K/UL (ref 0–0.3)
ABSOLUTE LYMPH #: 2.44 K/UL (ref 1.1–3.7)
ABSOLUTE MONO #: 0.89 K/UL (ref 0.1–1.2)
ANCA MYELOPEROXIDASE: 0.4 AU/ML (ref 0–3.5)
ANCA PROTEINASE 3: <0.7 AU/ML (ref 0–2)
ANION GAP SERPL CALCULATED.3IONS-SCNC: 14 MMOL/L (ref 9–17)
ANTI DNA DOUBLE STRANDED: 0.9 IU/ML
ANTI-NUCLEAR ANTIBODY (ANA): NEGATIVE
ANTI-XA UNFRAC HEPARIN: 0.44 IU/L (ref 0.3–0.7)
BASOPHILS # BLD: 1 % (ref 0–2)
BASOPHILS ABSOLUTE: 0.11 K/UL (ref 0–0.2)
BUN BLDV-MCNC: 67 MG/DL (ref 8–23)
BUN/CREAT BLD: 28 (ref 9–20)
C-REACTIVE PROTEIN: 85.3 MG/L (ref 0–5)
CALCIUM SERPL-MCNC: 8.7 MG/DL (ref 8.6–10.4)
CHLORIDE BLD-SCNC: 102 MMOL/L (ref 98–107)
CO2: 23 MMOL/L (ref 20–31)
CREAT SERPL-MCNC: 2.42 MG/DL (ref 0.5–0.9)
DIFFERENTIAL TYPE: ABNORMAL
ENA ANTIBODIES SCREEN: 0.1 U/ML
EOSINOPHILS RELATIVE PERCENT: 3 % (ref 1–4)
FERRITIN: 179 UG/L (ref 13–150)
FOLATE: 14.8 NG/ML
GFR AFRICAN AMERICAN: 24 ML/MIN
GFR NON-AFRICAN AMERICAN: 20 ML/MIN
GFR SERPL CREATININE-BSD FRML MDRD: ABNORMAL ML/MIN/{1.73_M2}
GFR SERPL CREATININE-BSD FRML MDRD: ABNORMAL ML/MIN/{1.73_M2}
GLUCOSE BLD-MCNC: 156 MG/DL (ref 70–99)
GLUCOSE BLD-MCNC: 244 MG/DL (ref 65–105)
GLUCOSE BLD-MCNC: 348 MG/DL (ref 65–105)
GLUCOSE BLD-MCNC: 373 MG/DL (ref 65–105)
GLUCOSE BLD-MCNC: 409 MG/DL (ref 65–105)
HCT VFR BLD CALC: 30.6 % (ref 36.3–47.1)
HEMOGLOBIN: 9.9 G/DL (ref 11.9–15.1)
IMMATURE GRANULOCYTES: 5 %
IRON SATURATION: 30 % (ref 20–55)
IRON: 58 UG/DL (ref 37–145)
LV EF: 65 %
LVEF MODALITY: NORMAL
LYMPHOCYTES # BLD: 22 % (ref 24–43)
MAGNESIUM: 2 MG/DL (ref 1.6–2.6)
MCH RBC QN AUTO: 29.5 PG (ref 25.2–33.5)
MCHC RBC AUTO-ENTMCNC: 32.4 G/DL (ref 28.4–34.8)
MCV RBC AUTO: 91.1 FL (ref 82.6–102.9)
MONOCYTES # BLD: 8 % (ref 3–12)
NRBC AUTOMATED: 0 PER 100 WBC
PDW BLD-RTO: 13.3 % (ref 11.8–14.4)
PLATELET # BLD: 290 K/UL (ref 138–453)
PLATELET ESTIMATE: ABNORMAL
PMV BLD AUTO: 11.1 FL (ref 8.1–13.5)
POTASSIUM SERPL-SCNC: 3.8 MMOL/L (ref 3.7–5.3)
RBC # BLD: 3.36 M/UL (ref 3.95–5.11)
RBC # BLD: ABNORMAL 10*6/UL
SEG NEUTROPHILS: 61 % (ref 36–65)
SEGMENTED NEUTROPHILS ABSOLUTE COUNT: 6.77 K/UL (ref 1.5–8.1)
SODIUM BLD-SCNC: 139 MMOL/L (ref 135–144)
TOTAL IRON BINDING CAPACITY: 192 UG/DL (ref 250–450)
UNSATURATED IRON BINDING CAPACITY: 134 UG/DL (ref 112–347)
VITAMIN B-12: >2000 PG/ML (ref 232–1245)
WBC # BLD: 11.1 K/UL (ref 3.5–11.3)
WBC # BLD: ABNORMAL 10*3/UL

## 2021-07-06 PROCEDURE — 83735 ASSAY OF MAGNESIUM: CPT

## 2021-07-06 PROCEDURE — 86140 C-REACTIVE PROTEIN: CPT

## 2021-07-06 PROCEDURE — 82607 VITAMIN B-12: CPT

## 2021-07-06 PROCEDURE — 83550 IRON BINDING TEST: CPT

## 2021-07-06 PROCEDURE — 85520 HEPARIN ASSAY: CPT

## 2021-07-06 PROCEDURE — 93306 TTE W/DOPPLER COMPLETE: CPT

## 2021-07-06 PROCEDURE — 80048 BASIC METABOLIC PNL TOTAL CA: CPT

## 2021-07-06 PROCEDURE — APPNB30 APP NON BILLABLE TIME 0-30 MINS: Performed by: NURSE PRACTITIONER

## 2021-07-06 PROCEDURE — 83540 ASSAY OF IRON: CPT

## 2021-07-06 PROCEDURE — 2060000000 HC ICU INTERMEDIATE R&B

## 2021-07-06 PROCEDURE — 85025 COMPLETE CBC W/AUTO DIFF WBC: CPT

## 2021-07-06 PROCEDURE — 36415 COLL VENOUS BLD VENIPUNCTURE: CPT

## 2021-07-06 PROCEDURE — 6370000000 HC RX 637 (ALT 250 FOR IP): Performed by: INTERNAL MEDICINE

## 2021-07-06 PROCEDURE — 6370000000 HC RX 637 (ALT 250 FOR IP): Performed by: FAMILY MEDICINE

## 2021-07-06 PROCEDURE — 6360000002 HC RX W HCPCS: Performed by: FAMILY MEDICINE

## 2021-07-06 PROCEDURE — 82947 ASSAY GLUCOSE BLOOD QUANT: CPT

## 2021-07-06 PROCEDURE — 2580000003 HC RX 258: Performed by: FAMILY MEDICINE

## 2021-07-06 PROCEDURE — 82728 ASSAY OF FERRITIN: CPT

## 2021-07-06 PROCEDURE — 99222 1ST HOSP IP/OBS MODERATE 55: CPT | Performed by: INTERNAL MEDICINE

## 2021-07-06 PROCEDURE — 82746 ASSAY OF FOLIC ACID SERUM: CPT

## 2021-07-06 RX ORDER — ALLOPURINOL 100 MG/1
100 TABLET ORAL DAILY
Status: DISCONTINUED | OUTPATIENT
Start: 2021-07-06 | End: 2021-07-10 | Stop reason: HOSPADM

## 2021-07-06 RX ORDER — METOPROLOL TARTRATE 50 MG/1
50 TABLET, FILM COATED ORAL 2 TIMES DAILY
Status: ON HOLD | COMMUNITY
End: 2021-07-10 | Stop reason: HOSPADM

## 2021-07-06 RX ORDER — PANTOPRAZOLE SODIUM 40 MG/1
40 TABLET, DELAYED RELEASE ORAL
Status: DISCONTINUED | OUTPATIENT
Start: 2021-07-07 | End: 2021-07-10 | Stop reason: HOSPADM

## 2021-07-06 RX ORDER — PRAVASTATIN SODIUM 10 MG
10 TABLET ORAL NIGHTLY
Status: DISCONTINUED | OUTPATIENT
Start: 2021-07-06 | End: 2021-07-10 | Stop reason: HOSPADM

## 2021-07-06 RX ORDER — HYDROCHLOROTHIAZIDE 50 MG/1
50 TABLET ORAL DAILY
Status: ON HOLD | COMMUNITY
End: 2021-07-10 | Stop reason: HOSPADM

## 2021-07-06 RX ORDER — INSULIN GLARGINE 100 [IU]/ML
30 INJECTION, SOLUTION SUBCUTANEOUS NIGHTLY
Status: DISCONTINUED | OUTPATIENT
Start: 2021-07-06 | End: 2021-07-10 | Stop reason: HOSPADM

## 2021-07-06 RX ORDER — AZITHROMYCIN 250 MG/1
500 TABLET, FILM COATED ORAL EVERY 24 HOURS
Status: COMPLETED | OUTPATIENT
Start: 2021-07-06 | End: 2021-07-08

## 2021-07-06 RX ORDER — AMLODIPINE BESYLATE 10 MG/1
10 TABLET ORAL DAILY
Status: DISCONTINUED | OUTPATIENT
Start: 2021-07-06 | End: 2021-07-06

## 2021-07-06 RX ORDER — DILTIAZEM HYDROCHLORIDE 60 MG/1
60 TABLET, FILM COATED ORAL EVERY 6 HOURS SCHEDULED
Status: DISCONTINUED | OUTPATIENT
Start: 2021-07-06 | End: 2021-07-10 | Stop reason: HOSPADM

## 2021-07-06 RX ADMIN — DILTIAZEM HYDROCHLORIDE 60 MG: 60 TABLET, FILM COATED ORAL at 18:14

## 2021-07-06 RX ADMIN — INSULIN LISPRO 5 UNITS: 100 INJECTION, SOLUTION INTRAVENOUS; SUBCUTANEOUS at 20:43

## 2021-07-06 RX ADMIN — INSULIN LISPRO 8 UNITS: 100 INJECTION, SOLUTION INTRAVENOUS; SUBCUTANEOUS at 11:38

## 2021-07-06 RX ADMIN — METOPROLOL TARTRATE 75 MG: 50 TABLET, FILM COATED ORAL at 20:43

## 2021-07-06 RX ADMIN — ACETAMINOPHEN 650 MG: 325 TABLET ORAL at 08:18

## 2021-07-06 RX ADMIN — ACETAMINOPHEN 650 MG: 325 TABLET ORAL at 22:16

## 2021-07-06 RX ADMIN — APIXABAN 2.5 MG: 2.5 TABLET, FILM COATED ORAL at 20:43

## 2021-07-06 RX ADMIN — INSULIN LISPRO 8 UNITS: 100 INJECTION, SOLUTION INTRAVENOUS; SUBCUTANEOUS at 18:14

## 2021-07-06 RX ADMIN — AZITHROMYCIN MONOHYDRATE 500 MG: 250 TABLET ORAL at 20:43

## 2021-07-06 RX ADMIN — INSULIN GLARGINE 30 UNITS: 100 INJECTION, SOLUTION SUBCUTANEOUS at 20:42

## 2021-07-06 RX ADMIN — DILTIAZEM HYDROCHLORIDE 60 MG: 60 TABLET, FILM COATED ORAL at 08:20

## 2021-07-06 RX ADMIN — METOPROLOL TARTRATE 75 MG: 50 TABLET, FILM COATED ORAL at 08:18

## 2021-07-06 RX ADMIN — CEFTRIAXONE SODIUM 1000 MG: 1 INJECTION, POWDER, FOR SOLUTION INTRAMUSCULAR; INTRAVENOUS at 15:28

## 2021-07-06 RX ADMIN — AMLODIPINE BESYLATE 10 MG: 10 TABLET ORAL at 08:22

## 2021-07-06 RX ADMIN — PROBIOTIC PRODUCT - TAB 1 TABLET: TAB at 08:20

## 2021-07-06 RX ADMIN — INSULIN LISPRO 4 UNITS: 100 INJECTION, SOLUTION INTRAVENOUS; SUBCUTANEOUS at 08:20

## 2021-07-06 RX ADMIN — PRAVASTATIN SODIUM 10 MG: 10 TABLET ORAL at 20:43

## 2021-07-06 RX ADMIN — ALLOPURINOL 100 MG: 100 TABLET ORAL at 08:20

## 2021-07-06 RX ADMIN — HEPARIN SODIUM 13.96 UNITS/KG/HR: 10000 INJECTION, SOLUTION INTRAVENOUS at 15:27

## 2021-07-06 ASSESSMENT — PAIN DESCRIPTION - FREQUENCY: FREQUENCY: INTERMITTENT

## 2021-07-06 ASSESSMENT — PAIN SCALES - GENERAL
PAINLEVEL_OUTOF10: 0
PAINLEVEL_OUTOF10: 5
PAINLEVEL_OUTOF10: 0
PAINLEVEL_OUTOF10: 3
PAINLEVEL_OUTOF10: 3
PAINLEVEL_OUTOF10: 0

## 2021-07-06 ASSESSMENT — PAIN DESCRIPTION - PAIN TYPE: TYPE: CHRONIC PAIN

## 2021-07-06 ASSESSMENT — PAIN DESCRIPTION - ORIENTATION: ORIENTATION: LOWER

## 2021-07-06 ASSESSMENT — PAIN DESCRIPTION - LOCATION: LOCATION: BACK

## 2021-07-06 NOTE — CONSULTS
Gastroenterology Consult Note      Patient: Neetu Pitt  : 1950  Acct#:  [de-identified]     Date:  2021    Subjective:       History of Present Illness  Patient is a 66 y.o.  female admitted with Pneumonia [J18.9] who is seen in consult for nausea, abnormal CT scan of the esophagus, diarrhea, anemia  This is a 78-year-old lady with history of anemia, history of kidney cellular, hypertension, A. fib for which she is currently on heparin drip. Currently admitted with pneumonia and found to be weak and because of the abnormality of the CAT scan with thickening distal esophagus a consultation was placed for us. She did admit to having nausea for 5 days, at home she did have some fever,  She did have several episodes of nonbloody diarrhea. She did take Pepto-Bismol as once at home with no change in her symptoms. CT scan of the abdomen on July 3 shows pneumonia with subtle lobular contour of the liver scattered colonic diverticuli, and scattered areas of colonic wall thickening  CAT scan of the chest on  shows thickening of the GE junction with a small hiatal hernia.   She had a creatinine of 2.4  Hemoglobin of 9.9  Liver enzymes are normal except the AST of 37  Denied any weight loss denied any lower GI bleed  As mentioned she is on heparin drip at this time    No known recent EGD colonoscopy may be remote not sure                    Past Medical History:   Diagnosis Date    Anemia     Arrhythmia     Arthritis     Asthma     CKD (chronic kidney disease) stage 1, GFR 90 ml/min or greater     DM type 2 (diabetes mellitus, type 2) (HCC)     HTN (hypertension)     Hypomagnesemia       Past Surgical History:   Procedure Laterality Date    BUNIONECTOMY      CARPAL TUNNEL RELEASE Left 2018    CARPAL TUNNEL RELEASE Right 10/2018    CHOLECYSTECTOMY      FINGER TRIGGER RELEASE Right     ring finger    HERNIA REPAIR      HYSTERECTOMY      TONSILLECTOMY AND ADENOIDECTOMY Past Endoscopic History as above    Admission Meds  No current facility-administered medications on file prior to encounter.      Current Outpatient Medications on File Prior to Encounter   Medication Sig Dispense Refill    hydroCHLOROthiazide (HYDRODIURIL) 50 MG tablet Take 50 mg by mouth daily      metoprolol tartrate (LOPRESSOR) 50 MG tablet Take 50 mg by mouth 2 times daily      valsartan (DIOVAN) 320 MG tablet TAKE 1 TABLET BY MOUTH EVERY DAY 90 tablet 1    allopurinol (ZYLOPRIM) 100 MG tablet TAKE 1 TABLET BY MOUTH EVERY DAY      Multiple Vitamins-Minerals (THERAPEUTIC MULTIVITAMIN-MINERALS) tablet Take 1 tablet by mouth daily      Biotin (BIOTIN 5000) 5 MG CAPS Take 1 capsule by mouth daily       Insulin Glargine (BASAGLAR KWIKPEN SC) Inject 30 Units into the skin nightly      metFORMIN (GLUCOPHAGE-XR) 500 MG extended release tablet TAKE 2 TABLETS BY MOUTH TWICE A DAY  3    amLODIPine (NORVASC) 10 MG tablet Take 10 mg by mouth daily       tiZANidine (ZANAFLEX) 4 MG tablet TAKE 1 TABLET BY MOUTH, AT BEDTIME DAILY  0    Probiotic Product (ACIDOPHILUS PROBIOTIC) CAPS capsule Take 1 capsule by mouth daily      aspirin 81 MG EC tablet Take 1 tablet by mouth daily 30 tablet 3    glimepiride (AMARYL) 4 MG tablet Take 1 tablet by mouth 2 times daily (with meals) (Patient taking differently: Take 8 mg by mouth every morning ) 30 tablet 3    pravastatin (PRAVACHOL) 10 MG tablet Take 1 tablet by mouth nightly 30 tablet 3    calcium carbonate (OSCAL) 500 MG TABS tablet Take 1,200 mg by mouth daily Take 2 tabs daily       NONFORMULARY Circulation and vein support      Blood Glucose Monitoring Suppl (ONE TOUCH ULTRA 2) W/DEVICE KIT USE BID UTD  0       Patient   Does Use ASA, NSAID No  Allergies  Allergies   Allergen Reactions    Iodine     Shellfish-Derived Products     Victoza [Liraglutide]      Abdominal pain        Social   Social History     Tobacco Use    Smoking status: Never Smoker    Smokeless tobacco: Never Used   Substance Use Topics    Alcohol use: No        PSYCH HISTORY:  Depression No  Anxiety No  Suicide No       History reviewed. No pertinent family history. No family history of colon cancer, Crohn's disease, or ulcerative colitis. Review of Systems  Constitutional: negative  Eyes: negative  Ears, nose, mouth, throat, and face: negative  Respiratory: negative  Cardiovascular: negative  Gastrointestinal: negative  Genitourinary:negative  Integument/breast: negative  Hematologic/lymphatic: negative  Musculoskeletal:negative  Endocrine: negative           Physical Exam  Blood pressure (!) 145/57, pulse 90, temperature 97.7 °F (36.5 °C), temperature source Oral, resp. rate 16, height 5' (1.524 m), weight 229 lb (103.9 kg), SpO2 97 %.          General Appearance: alert and oriented to person, place and time, well-developed and well-nourished, in no acute distress  Skin: warm and dry, no rash or erythema  Head: normocephalic and atraumatic  Eyes: pupils equal, round, and reactive to light, extraocular eye movements intact, conjunctivae normal  ENT: hearing grossly normal bilaterally  Neck: neck supple and non tender without mass, no thyromegaly or thyroid nodules, no cervical lymphadenopathy   Pulmonary/Chest: clear to auscultation bilaterally- no wheezes, rales or rhonchi, normal air movement, no respiratory distress  Cardiovascular: normal rate, regular rhythm, normal S1 and S2, no murmurs, rubs, clicks or gallops, distal pulses intact, no carotid bruits  Abdomen: soft, non-tender, non-distended, normal bowel sounds, no masses or organomegaly  Extremities: no cyanosis, clubbing or edema  Musculoskeletal: normal range of motion, no joint swelling, deformity or tenderness  Neurologic: no cranial nerve deficit and muscle strength normal    Data Review:    Recent Labs     07/04/21  0428 07/05/21  0515 07/06/21  0211   WBC 12.2* 8.2 11.1   HGB 10.9* 10.1* 9.9*   HCT 32.8* 30.2* 30.6*   MCV 90.6 89.6 91.1    224 290     Recent Labs     07/04/21  0428 07/05/21  0515 07/06/21  0211   * 134* 139   K 3.5* 3.4* 3.8   CL 99 100 102   CO2 18* 19* 23   BUN 63* 70* 67*   CREATININE 2.90* 2.49* 2.42*     No results for input(s): AST, ALT, ALB, BILIDIR, BILITOT, ALKPHOS in the last 72 hours. No results for input(s): LIPASE, AMYLASE in the last 72 hours. Recent Labs     07/03/21  2106 07/04/21  0428   PROTIME 14.6* 14.4*   INR 1.2 1.1     No results for input(s): PTT in the last 72 hours. No results for input(s): OCCULTBLD in the last 72 hours. CEA:  No results found for: CEA  Ca 125:  No results found for:   Ca 19-9:  No results found for:   Ca 15-3:  No results found for:   AFP:  No components found for: AFAFP  Beta HCG:  No components found for: BHCG  Neuron Specific Enolase:  No results found for: NSE  Imaging Studies:                           All appropriate imaging studies and reports reviewed: Yes                 Assessment:     Active Problems:    CKD (chronic kidney disease) stage 3, GFR 30-59 ml/min (HCC)    Type 2 diabetes mellitus, without long-term current use of insulin (HCC)    Pneumonia    Abnormal CT scan, chest    Anemia    Diarrhea  Resolved Problems:    * No resolved hospital problems. *    Abnormal CT scan of the abdomen with thickening of the GE junction  A previous CAT scan which showed thickening of the colon on CAT scan of the abdomen on the third   some diarrhea  Significant anemia  Hemoccult positive stool  Mild elevation in AST  Currently admitted with pneumonia    Recommendations: Will need EGD  And most likely colonoscopy when she is stable  Anemia profile and supplement if needed  H&H monitoring  PPI  Antibiotics for the pneumonia  If she had diarrhea and stool for culture and sensitivity and C. difficile                      Thank you for allowing me to participate in the care of your patient.   Please feel free to contact me with any questions or concerns.      Chu Peoples MD

## 2021-07-06 NOTE — PROGRESS NOTES
Nephrology Progress Note    Subjective/   66y.o. year old female who we are seeing in consultation for acute kidney injury. Interval history:  Patient feels better. She has improving appetite no diarrhea or fever or nausea. Urine output is improving with 1.8 L yesterday, 1600 ml since morning  Patient is hemodynamically stable with controlled heart rate. Scr 2.4 mg/dl  DARI negative, ANCA negative    History of Present Illness: This is a 66 y.o. female with past medical history chronic kidney disease 3 a with baseline creatinine of 1.2-1.4 mg/dL, type 2 diabetes, essential hypertension. Patient presented to the hospital with complaints of lethargy chills not feeling good poor oral intake and decrease in appetite for about 1 week nausea no vomiting. Patient did have few loose stools, patient also complained of cough and saw some streaks of blood twice  Patient also noted tachycardia,notedto be Afib. CT abdomen and pelvis showed left lower lobe pneumonia with small left-sided pleural effusion, scattered areas of colonic wall thickening. Pt denies any history of  prolonged NSAID use. Patient denies dysuria, gross hematuria, flank pain, nocturia, urgency, passing frothy urine or urinary incontinence. There has been no recent exposure to IV contrast.There is no history  of paraprotein disease. Pt denies any history of recurrent UTI or kidney stones. Medication review shows use of ARB's, hydrochlorothiazide, Metformin  Blood pressure stable no hypotension noted.         Objective/     Vitals:    07/06/21 0401 07/06/21 0755 07/06/21 1058 07/06/21 1536   BP: (!) 147/68 122/71 (!) 124/45 (!) 145/57   Pulse: 77 107 94 90   Resp: 18 16 16 16   Temp: 97.7 °F (36.5 °C) 97.9 °F (36.6 °C) 98.1 °F (36.7 °C) 97.7 °F (36.5 °C)   TempSrc: Oral Oral Oral Oral   SpO2: 97% 97% 97% 97%   Weight:       Height:         24HR INTAKE/OUTPUT:      Intake/Output Summary (Last 24 hours) at 7/6/2021 6535  Last data filed at 7/6/2021 1744  Gross per 24 hour   Intake 2955 ml   Output 3550 ml   Net -595 ml     Patient Vitals for the past 96 hrs (Last 3 readings):   Weight   07/03/21 1327 229 lb (103.9 kg)       Constitutional:  Alert, awake, no apparent distress  Cardiovascular:  S1, S2 without m/r/g  Respiratory: Crackles left lower lung base otherwise lungs are clear  Abdomen: +bs, soft, nt  Ext:  LE edema    Data/  Recent Labs     07/04/21 0428 07/05/21 0515 07/06/21  0211   WBC 12.2* 8.2 11.1   HGB 10.9* 10.1* 9.9*   HCT 32.8* 30.2* 30.6*   MCV 90.6 89.6 91.1    224 290     Recent Labs     07/04/21 0428 07/05/21 0515 07/06/21  0211   * 134* 139   K 3.5* 3.4* 3.8   CL 99 100 102   CO2 18* 19* 23   GLUCOSE 159* 217* 156*   MG 2.0 2.0 2.0   BUN 63* 70* 67*   CREATININE 2.90* 2.49* 2.42*   LABGLOM 16* 19* 20*   GFRAA 19* 23* 24*         Assessment/   1. Acute kidney injury, nonoliguric on CKD 3 most likely secondary to prerenal azotemia, from poor oral intake use of ARB and diuretics and hemodynamic related to A. fib with RVR-renal function is slowly improving-serum creatinine 2.49 mg/dL    2. Type 2 diabetes    3. Essential hypertension-controlled    4. CKD stage III with baseline creatinine of 1.2 to 1.4 mg/dL most likely secondary to diabetic nephropathy    5. Urinary tract infection with pyuria    6. CT finding of suspected lower lobe pneumonia-on IV antibiotics       7. Hypokalemia      8. gap metabolic acidosis secondary to BRANDON-improving  Plan/   Continue sodium bicarbonate 75 meq + 0.45 saline at 75 cc/h  Give 40 meq p.o. of potassium Cl. Strict I's and O's  Continue IV azithromycin and Rocephin  Follow the urine cultures and sensitivity  Avoid nephrotoxic drugs. Patient on heparin. Akilah Thompson MD    Nephrologist

## 2021-07-06 NOTE — CARE COORDINATION
BPCI-A Medical Bundle Patient:  Working DRG: pneumonia- DRG 80  Admitting Location: [facility] Date: 7/3/2021  Date of Discharge:     Bundle End Date: +++    90-day post-acute outreach and tracking with qualifying DRG.

## 2021-07-06 NOTE — PROGRESS NOTES
Progress Note(Hospital)    [facility] PROGRESSIVE CARE     Admition Status: Inpatient [101]     CC:Anorexia (No appetite for one week along with nausea ) and Fatigue (Pt reports generalized weakness for a couple of days )    HCC:  Patient feeling pretty good today. She had some esophageal thickening on CT scan of the chest which raise concerns we did get GI involved. And is improving overall.           Current Facility-Administered Medications:     allopurinol (ZYLOPRIM) tablet 100 mg, 100 mg, Oral, Daily, Sage Stuartkas, DO, 100 mg at 07/06/21 0820    amLODIPine (NORVASC) tablet 10 mg, 10 mg, Oral, Daily, Sage Sims Neverzaydakas, DO, 10 mg at 07/06/21 3762    insulin glargine (LANTUS) injection vial 30 Units, 30 Units, Subcutaneous, Nightly, Mt Narayan DO    pravastatin (PRAVACHOL) tablet 10 mg, 10 mg, Oral, Nightly, Mt Narayan DO    lactobacillus (BACID) tablet 1 tablet, 1 tablet, Oral, Daily, Sage Duboses, DO, 1 tablet at 07/06/21 0820    azithromycin (ZITHROMAX) tablet 500 mg, 500 mg, Oral, Q24H, Mt Narayan DO    dilTIAZem (CARDIZEM) tablet 60 mg, 60 mg, Oral, 2 times per day, Fabio Igor, DO, 60 mg at 07/06/21 0820    metoprolol tartrate (LOPRESSOR) tablet 75 mg, 75 mg, Oral, BID, Fabio Igor, DO, 75 mg at 07/06/21 0818    sodium chloride flush 0.9 % injection 5-40 mL, 5-40 mL, Intravenous, 2 times per day, Sage Sims Neverzaydakas, DO, 10 mL at 07/03/21 2212    sodium chloride flush 0.9 % injection 10 mL, 10 mL, Intravenous, PRN, Sage Stuartkas, DO    0.9 % sodium chloride infusion, 25 mL, Intravenous, PRN, Sage Duboses, DO    ondansetron (ZOFRAN-ODT) disintegrating tablet 4 mg, 4 mg, Oral, Q8H PRN **OR** ondansetron (ZOFRAN) injection 4 mg, 4 mg, Intravenous, Q6H PRN, Sage Narayan DO    polyethylene glycol (GLYCOLAX) packet 17 g, 17 g, Oral, Daily PRN, Sage Narayan DO    acetaminophen (TYLENOL) tablet 650 mg, 650 mg, Oral, Q6H PRN, 650 mg at 07/06/21 0818 **OR** acetaminophen (TYLENOL) suppository 650 mg, 650 mg, Rectal, Q6H PRN, Dewayne Stuartkas, DO    cefTRIAXone (ROCEPHIN) 1000 mg IVPB in 50 mL D5W minibag, 1,000 mg, Intravenous, Q24H, Mt Narayan DO, Stopped at 07/05/21 1500    insulin lispro (HUMALOG) injection vial 0-12 Units, 0-12 Units, Subcutaneous, TID WC, Dewayne Stuartkas, DO, 4 Units at 07/06/21 0820    insulin lispro (HUMALOG) injection vial 0-6 Units, 0-6 Units, Subcutaneous, Nightly, Dewayne Stuartkas, DO, 5 Units at 07/05/21 2003    glucose (GLUTOSE) 40 % oral gel 15 g, 15 g, Oral, PRN, Dewayne Stuartkas, DO    dextrose 50 % IV solution, 12.5 g, Intravenous, PRN, Dewayne Montenegroauskas, DO    glucagon (rDNA) injection 1 mg, 1 mg, Intramuscular, PRN, Dewayne Cavazos Neverauskas, DO    dextrose 5 % solution, 100 mL/hr, Intravenous, PRN, Alexeya Macy Neverauskas, DO    sodium bicarbonate 75 mEq in sodium chloride 0.45 % 1,000 mL infusion, , Intravenous, Continuous, So Patel MD, Last Rate: 75 mL/hr at 07/05/21 1417, New Bag at 07/05/21 1417    heparin (porcine) injection 8,310 Units, 80 Units/kg, Intravenous, PRN, Ninalla Macy Neverauskas, DO    heparin (porcine) injection 4,160 Units, 40 Units/kg, Intravenous, PRN, Ninalla Macy Neverauskas, DO, 4,160 Units at 07/05/21 0625    heparin 25,000 units in dextrose 5% 250 mL (premix) infusion, 5-30 Units/kg/hr, Intravenous, Continuous, Dewayne Duboses, DO, Last Rate: 14.5 mL/hr at 07/05/21 2106, 14 Units/kg/hr at 07/05/21 2106    metoprolol (LOPRESSOR) injection 5 mg, 5 mg, Intravenous, Q6H PRN, Fabio Costa DO    O:  BP (!) 124/45   Pulse 94   Temp 98.1 °F (36.7 °C) (Oral)   Resp 16   Ht 5' (1.524 m)   Wt 229 lb (103.9 kg)   SpO2 97%   BMI 44.72 kg/m²     Intake/Output Summary (Last 24 hours) at 7/6/2021 1130  Last data filed at 7/6/2021 1033  Gross per 24 hour   Intake 2905 ml   Output 2650 ml   Net 255 ml       Physical Exam  Constitutional:       General: She is not in acute distress. Appearance: She is well-developed. HENT:      Head: Normocephalic and atraumatic. Neck:      Vascular: No JVD. Cardiovascular:      Rate and Rhythm: Normal rate and regular rhythm. Heart sounds: No murmur heard. No friction rub. Pulmonary:      Effort: Pulmonary effort is normal. No respiratory distress. Breath sounds: Normal breath sounds. No stridor. Abdominal:      General: Bowel sounds are normal.      Palpations: Abdomen is soft. Genitourinary:     Comments: No Examined  Skin:     General: Skin is warm and dry. Psychiatric:         Mood and Affect: Mood normal.         Behavior: Behavior normal.         Thought Content: Thought content normal.         Judgment: Judgment normal.         Labs:      Lab Results   Component Value Date/Time    WBC 11.1 07/06/2021 02:11 AM    HGB 9.9 (L) 07/06/2021 02:11 AM    HCT 30.6 (L) 07/06/2021 02:11 AM     07/06/2021 02:11 AM    NEUTROABS 6.77 07/06/2021 02:11 AM     Lab Results   Component Value Date/Time     07/06/2021 02:11 AM    K 3.8 07/06/2021 02:11 AM     07/06/2021 02:11 AM    CREATININE 2.42 (H) 07/06/2021 02:11 AM    BUN 67 (H) 07/06/2021 02:11 AM    GLUCOSE 156 (H) 07/06/2021 02:11 AM     Lab Results   Component Value Date    PROBNP 13,599 07/03/2021    PROBNP 919 06/21/2019    PROBNP 1,105 06/20/2019     Lab Results   Component Value Date    CRP 85.3 07/06/2021    .7 07/05/2021    .1 07/04/2021     Lab Results   Component Value Date    INR 1.1 07/04/2021     Recent Labs     07/03/21  1400   PROCAL 1.86*         Rad:    CT ABDOMEN PELVIS WO CONTRAST Additional Contrast? None    Result Date: 7/3/2021  EXAMINATION: CT OF THE ABDOMEN AND PELVIS WITHOUT CONTRAST 7/3/2021 3:05 pm TECHNIQUE: CT of the abdomen and pelvis was performed without the administration of intravenous contrast. Multiplanar reformatted images are provided for review.  Dose modulation, iterative reconstruction, and/or weight based adjustment of the mA/kV was utilized to reduce the radiation dose to as low as reasonably achievable. COMPARISON: None. HISTORY: ORDERING SYSTEM PROVIDED HISTORY: sepsis, unclear eitology persistent diarrhea/abd cramping TECHNOLOGIST PROVIDED HISTORY: sepsis, unclear eitology persistent diarrhea/abd cramping Decision Support Exception - unselect if not a suspected or confirmed emergency medical condition->Emergency Medical Condition (MA) Reason for Exam: Sepsis. Acuity: Acute Type of Exam: Initial FINDINGS: Lower Chest: Patchy consolidative changes seen in the left lower lobe. Tiny left-sided pleural effusion is seen Organs: Adrenal glands appear normal. No splenomegaly. No perisplenic fluid There is a subtle lobular contour to the liver. There are clips from prior cholecystectomy No peripancreatic fluid. No peripancreatic inflammatory change No stones or hydronephrosis on the right. No stones or hydronephrosis on the left No peripancreatic fluid. No peripancreatic inflammatory change. Trace left-sided pleural effusion is seen GI/Bowel: No significant small bowel distention noted. Mild stool load seen in the colon. Scattered colonic diverticula are seen. Colon is incompletely distended, accentuating its wall thickness. No significant pericolonic stranding Pelvis: No free fluid in pelvis. No pelvic adenopathy. Bladder is incompletely distended Peritoneum/Retroperitoneum: Atherosclerotic change seen in aorta. Tiny retroperitoneal nodes are seen. Bones/Soft Tissues: There is diastases of the rectus muscles.   Tiny periumbilical hernia containing fat is seen     Left lower lobe pneumonia with small left-sided pleural effusion Scattered areas of colonic wall thickening are seen, either due to the partially contracted state of the colon or wall thickening from early colitis     CT CHEST WO CONTRAST    Result Date: 7/4/2021  EXAMINATION: CT OF THE CHEST WITHOUT CONTRAST 7/4/2021 12:15 pm TECHNIQUE: CT of the chest was performed without the administration of intravenous contrast. Multiplanar reformatted images are provided for review. Dose modulation, iterative reconstruction, and/or weight based adjustment of the mA/kV was utilized to reduce the radiation dose to as low as reasonably achievable. COMPARISON: None. HISTORY: ORDERING SYSTEM PROVIDED HISTORY: Pneumonia TECHNOLOGIST PROVIDED HISTORY: NO IV CONTRAST Pneumonia Reason for Exam: Pneumonia seen on prior CT Scan. Acuity: Acute Type of Exam: Initial FINDINGS: Mediastinum: Calcifications are seen in the thyroid. A few hypodense nodules are seen measuring 9 mm in size or less, for which no specific imaging follow-up is recommended based on size Small mediastinal and hilar nodes are noted. Mild coronary artery calcification is seen. Aortic valve calcification is seen. Trace pericardial fluid is seen. Small hiatal hernia seen. There is nonspecific thickening at the GE junction Lungs/pleura: Bandlike opacity seen in the right middle lobe. Bandlike opacity seen in the right lower lobe. No focal consolidation is seen in the right lung. Mild traction bronchiectasis is seen in the right middle lobe. Patchy consolidation is seen in the left lower lobe. Small left-sided pleural effusion is seen. There is adjacent left basilar opacity. Upper Abdomen: Right adrenal gland is normal.  Left adrenal gland is normal. Atherosclerotic change seen in abdominal aorta. There is subtle lobular contour to the liver. Mild stool load is seen in the colon. .  A few scattered colonic diverticula are seen. Soft Tissues/Bones: Spurring is seen in the spine. Spurring is seen in the shoulder joints. Left lower lobe pneumonia with small left-sided pleural effusion. No focal consolidation on the right.      XR CHEST PORTABLE    Result Date: 7/3/2021  EXAMINATION: ONE XRAY VIEW OF THE CHEST 7/3/2021 2:26 pm COMPARISON: Chest radiograph performed 06/18/2019. HISTORY: ORDERING SYSTEM PROVIDED HISTORY: chest pain TECHNOLOGIST PROVIDED HISTORY: chest pain Reason for Exam: Pt states she's feeling ill x 6 days. Fatigue, chest pain. AP UPRIGHT PORTABLE Acuity: Acute Type of Exam: Subsequent/Follow-up FINDINGS: There is no acute consolidation or effusion. There is no pneumothorax. The mediastinal structures are unremarkable. The upper abdomen is unremarkable. The extrathoracic soft tissues are unremarkable. There is no acute osseous abnormality. No acute cardiopulmonary process. A:  Active Hospital Problems    Diagnosis Date Noted    Pneumonia [J18.9] 07/03/2021    Type 2 diabetes mellitus, without long-term current use of insulin ([facility]) [E11.9] 06/17/2019    CKD (chronic kidney disease) stage 3, GFR 30-59 ml/min ([facility]) [N18.30] 03/21/2018           P:  1. Continue antibiotics. 2.  GI consult for evaluation of esophageal thickening. Yokasta Narayan DO  11:30 AM  7/6/2021    This note was created with the assistance of a speech-recognition program. Although the intention is to generate a document that actually reflects the content of the visit, no guarantees can be provided that every mistake has been identified and corrected by editing.

## 2021-07-06 NOTE — PROGRESS NOTES
Transitions of Care Pharmacy Service   Medication Review    The patient's list of current home medications has been reviewed. Source(s) of information: [employer], patient    Based on information provided by the above source(s), I have updated the patient's home med list as described below. Please review the ACTION REQUESTED section of this note below for any discrepancies on current hospital orders. I changed or updated the following medications on the patient's home medication list:  Removed Voltaren Gel     Added      Adjusted   Hydrochlorothiazide - > 50mg daily  Metoprolol tartrate - > 50mg BID   Other Notes          Please feel free to call me with any questions about this encounter. Thank you.     Matthew Rousseau, [facility]   Transitions of Care Pharmacy Service  Phone:  [phone]  Fax: [phone]      Electronically signed by Matthew Rousseau [facility] on 7/6/2021 at 3:34 PM     Medications Prior to Admission: hydroCHLOROthiazide (HYDRODIURIL) 50 MG tablet, Take 50 mg by mouth daily  metoprolol tartrate (LOPRESSOR) 50 MG tablet, Take 50 mg by mouth 2 times daily  valsartan (DIOVAN) 320 MG tablet, TAKE 1 TABLET BY MOUTH EVERY DAY  allopurinol (ZYLOPRIM) 100 MG tablet, TAKE 1 TABLET BY MOUTH EVERY DAY  Multiple Vitamins-Minerals (THERAPEUTIC MULTIVITAMIN-MINERALS) tablet, Take 1 tablet by mouth daily  Biotin (BIOTIN 5000) 5 MG CAPS, Take 1 capsule by mouth daily   Insulin Glargine (BASAGLAR KWIKPEN SC), Inject 30 Units into the skin nightly  metFORMIN (GLUCOPHAGE-XR) 500 MG extended release tablet, TAKE 2 TABLETS BY MOUTH TWICE A DAY  amLODIPine (NORVASC) 10 MG tablet, Take 10 mg by mouth daily   tiZANidine (ZANAFLEX) 4 MG tablet, TAKE 1 TABLET BY MOUTH, AT BEDTIME DAILY  Probiotic Product (ACIDOPHILUS PROBIOTIC) CAPS capsule, Take 1 capsule by mouth daily  aspirin 81 MG EC tablet, Take 1 tablet by mouth daily  glimepiride (AMARYL) 4 MG tablet, Take 1 tablet by mouth 2 times daily (with meals) (Patient taking differently: Take 8 mg by mouth every morning )  pravastatin (PRAVACHOL) 10 MG tablet, Take 1 tablet by mouth nightly  calcium carbonate (OSCAL) 500 MG TABS tablet, Take 1,200 mg by mouth daily Take 2 tabs daily

## 2021-07-06 NOTE — PROGRESS NOTES
[facility]   Progress Note                   Date:   7/6/2021  Patient name: Bre Arevalo  Date of admission:  7/3/2021  1:37 PM  MRN:   [id number]  YOB: 1950  PCP: Ettie Councilman, MD    Reason for Admission: Pneumonia and A.fib with RVR. Subjective:       Clinical Changes / Abnormalities: Feeling better today, less short of breath with no chest pain.   Rate is borderline controlled.  -1 L since admission      Medications:   Scheduled Meds:   allopurinol  100 mg Oral Daily    amLODIPine  10 mg Oral Daily    insulin glargine  30 Units Subcutaneous Nightly    pravastatin  10 mg Oral Nightly    lactobacillus  1 tablet Oral Daily    azithromycin  500 mg Oral Q24H    [START ON 7/7/2021] pantoprazole  40 mg Oral QAM AC    dilTIAZem  60 mg Oral 2 times per day    metoprolol tartrate  75 mg Oral BID    sodium chloride flush  5-40 mL Intravenous 2 times per day    cefTRIAXone (ROCEPHIN) IV  1,000 mg Intravenous Q24H    insulin lispro  0-12 Units Subcutaneous TID WC    insulin lispro  0-6 Units Subcutaneous Nightly     Continuous Infusions:   sodium chloride      dextrose      sodium bicarbonate infusion 75 mL/hr at 07/05/21 1417    heparin (PORCINE) Infusion 14 Units/kg/hr (07/05/21 2106)     CBC:   Recent Labs     07/04/21 0428 07/05/21 0515 07/06/21  0211   WBC 12.2* 8.2 11.1   HGB 10.9* 10.1* 9.9*    224 290     BMP:    Recent Labs     07/04/21 0428 07/05/21 0515 07/06/21  0211   * 134* 139   K 3.5* 3.4* 3.8   CL 99 100 102   CO2 18* 19* 23   BUN 63* 70* 67*   CREATININE 2.90* 2.49* 2.42*   GLUCOSE 159* 217* 156*     INR:   Recent Labs     07/03/21 2106 07/04/21 0428   INR 1.2 1.1       Objective:   Vitals: BP (!) 124/45   Pulse 94   Temp 98.1 °F (36.7 °C) (Oral)   Resp 16   Ht 5' (1.524 m)   Wt 229 lb (103.9 kg)   SpO2 97%   BMI 44.72 kg/m²   General appearance: alert and cooperative with exam  HEENT: Head: Normocephalic, no lesions, without obvious abnormality. Neck: No JVD, supple. Lungs: clear to auscultation bilaterally  Heart: Irregularly irregular rate and rhythm, S1, S2 normal, no murmur, click, rub or gallop  Abdomen: soft, non-tender; bowel sounds normal; no masses,  no organomegaly  Extremities: extremities normal, atraumatic, no cyanosis or edema  Neurologic: Mental status: Alert, oriented, thought content appropriate    Patient Active Problem List:     HTN (hypertension)     Hypomagnesemia     CKD (chronic kidney disease) stage 3, GFR 30-59 ml/min (HCC)     Vitamin D deficiency     Reactive airway disease that is not asthma     Type 2 diabetes mellitus, without long-term current use of insulin (HCC)     Mixed hyperlipidemia     Nontoxic single thyroid nodule     Pneumonia     Abnormal CT scan, chest     Anemia     Diarrhea      Assessment / Acute Cardiac Problems:     1. New onset A. fib with RVR currently on rate control and anticoagulation  2. Minimal troponin elevation consistent with type II MI due to BRANDON on CKD and pneumonia  3. Hypertension  4. Diabetes mellitus  5. Hyperlipidemia  6. Pneumonia  7. BRANDON on CKD    Plan of Treatment:     1. Continue rate control and anticoagulation  2. Continue beta-blockers and calcium channel blockers for rate control. I will discontinue Norvasc and increase Cardizem to 60 mg every 6 hours for better rate control  3. Currently on heparin drip for anticoagulation  4. Awaiting 2D echo  5. We will need long-term anticoagulation with Eliquis on discharge  6. Further recommendations to follow post echocardiogram.  7. Will consider cardioversion as an outpatient after adequate anticoagulation.       Electronically signed by Darshan Priest MD on 7/6/2021 at 2:57 PM      [facility]  [phone]

## 2021-07-06 NOTE — PROGRESS NOTES
Physician Progress Note      PATIENT:               Jeny Chne  CSN #:                  [id number]  :                       1950  ADMIT DATE:       7/3/2021 1:37 PM  DISCH DATE:  RESPONDING  PROVIDER #:        ABILIO CARIAS DO          QUERY TEXT:    Pt admitted with pneumonia. Pt noted to have elevated white count If possible,   please document in the progress notes and discharge summary if you are   evaluating and /or treating any of the following: The medical record reflects the following:  Risk Factors: Pneumonia  Clinical Indicators: WBC of 14.8, HR as high as 111, Creatinine of 2.95 with   baseline of 0.5 - 0.9, procal of 1.86, CRP of 161.1, Lactic Acid 1.9  Treatment: IV Zithromax, IV Rocephin, IVF Bolus of NS 1000 mL, IVF @ 75 mL/hr,   daily labs, monitoring    Thank you,  Garrett Jimenez, RN  Options provided:  -- Sepsis, present on admission  -- Pneumonia without Sepsis  -- Sepsis was ruled out  -- Other - I will add my own diagnosis  -- Disagree - Not applicable / Not valid  -- Disagree - Clinically unable to determine / Unknown  -- Refer to Clinical Documentation Reviewer    PROVIDER RESPONSE TEXT:    Provider disagreed with this query.  m    Query created by:  Kenneth Leyden on 2021 12:00 PM      Electronically signed by:  Freda Mera DO 2021 3:18 PM

## 2021-07-06 NOTE — CARE COORDINATION
Discharge planning    Brenton Frank RN confirmed that eliquis 2.5 mg is ok with cardiology based on nephro recommendations. Free month voucher with rx for eliquis 2.5 mg sent to [facility] to be filled.

## 2021-07-06 NOTE — PLAN OF CARE
PRE CONSULT ROUNDING NOTE  HPI  66year old female with pmh of anemia ckd dm htn  afib who presented to the ED for weakness with poor oral intake, diarrhea and nausea. She is being treated for pneumonia. our service is consulted for abnormal ct scan imaging. She reports her symptoms started 5 days ago. No chills but she reports subjective fever at home. She reports several episodes of non bloody diarrhea. No recent travel or antibiotic use. Has been vaccinated for covid. She took pepto bismol once at home for her symptoms with no change. CT abd 7/3 shows pneumonia with subtle lobular contour of the liver scattered colonic diverticula and scattered areas of colonic wall thickening. ct chest 7/4 shows thickening of the ge junction with a small hiatal hernia. Creat 2.42 hgb 9.9 ast 37. She has not had melita loss dysphagia melena hematochezia hematemesis rash. She is on a heparin drip for afib. She denies nsaid use.       Endoscopy possible egd and colonoscopy in the past, pt does not know  Family reports no hx of liver pancreatic stomach or colon cancer no uc /crohns  Social no smoking no etoh or illicit drugs  BP (!) 571/77   Pulse 94   Temp 98.1 °F (36.7 °C) (Oral)   Resp 16   Ht 5' (1.524 m)   Wt 229 lb (103.9 kg)   SpO2 97%   BMI 44.72 kg/m²     ROS as above meds labs imaging and past medical records were reviewed    Exam  General Appearance: alert and oriented to person, place and time, well-developed and well-nourished, in no acute distress  Skin: warm and dry, no rash or erythema  Head: normocephalic and atraumatic  Eyes: pupils equal, round, and reactive to light, extraocular eye movements intact, conjunctivae normal  ENT: hearing grossly normal bilaterally  Neck: neck supple and non tender without mass, no thyromegaly or thyroid nodules, no cervical lymphadenopathy   Pulmonary/Chest: clear to auscultation bilaterally- no wheezes, rales or rhonchi, normal air movement, no respiratory distress  Cardiovascular: normal rate, regular rhythm, normal S1 and S2, no murmurs, rubs, clicks or gallops, distal pulses intact, no carotid bruits  Abdomen: soft, obese non tender, non-distended, normal bowel sounds, no masses or organomegaly no ascites  Extremities: no cyanosis, clubbing or edema  Musculoskeletal: normal range of motion, no joint swelling, deformity or tenderness  Neurologic: no cranial nerve deficit and muscle strength normal    Assessment  Abnormal ct imaging showing thickening of the ge junction  Diarrhea  Anemia  Positive fobt  Elevated ast  pneumonia    Plan  Will discuss case with md, may need egd to evaluate the ct findings  Anemia profile ordered  Trend  and keep hgb >7  ppi  Continue antibiotics for pneumonia  Will order stool culture  Formal gi consult to follow  . Mukesh Salinas, APRN - CNP

## 2021-07-07 LAB
ABSOLUTE EOS #: 0.3 K/UL (ref 0–0.44)
ABSOLUTE IMMATURE GRANULOCYTE: 0.81 K/UL (ref 0–0.3)
ABSOLUTE LYMPH #: 1.72 K/UL (ref 1.1–3.7)
ABSOLUTE MONO #: 0.81 K/UL (ref 0.1–1.2)
ALBUMIN (CALCULATED): 3.3 G/DL (ref 3.2–5.2)
ALBUMIN PERCENT: 56 % (ref 45–65)
ALPHA 1 PERCENT: 5 % (ref 3–6)
ALPHA 2 PERCENT: 21 % (ref 6–13)
ALPHA-1-GLOBULIN: 0.3 G/DL (ref 0.1–0.4)
ALPHA-2-GLOBULIN: 1.2 G/DL (ref 0.5–0.9)
ANION GAP SERPL CALCULATED.3IONS-SCNC: 13 MMOL/L (ref 9–17)
ANTI-XA UNFRAC HEPARIN: 0.74 IU/L (ref 0.3–0.7)
BASOPHILS # BLD: 1 % (ref 0–2)
BASOPHILS ABSOLUTE: 0.1 K/UL (ref 0–0.2)
BETA GLOBULIN: 0.7 G/DL (ref 0.5–1.1)
BETA PERCENT: 12 % (ref 11–19)
BUN BLDV-MCNC: 50 MG/DL (ref 8–23)
BUN/CREAT BLD: 29 (ref 9–20)
C-REACTIVE PROTEIN: 46.9 MG/L (ref 0–5)
CALCIUM SERPL-MCNC: 8.2 MG/DL (ref 8.6–10.4)
CHLORIDE BLD-SCNC: 103 MMOL/L (ref 98–107)
CO2: 25 MMOL/L (ref 20–31)
CREAT SERPL-MCNC: 1.71 MG/DL (ref 0.5–0.9)
DIFFERENTIAL TYPE: ABNORMAL
EKG ATRIAL RATE: 118 BPM
EKG Q-T INTERVAL: 284 MS
EKG QRS DURATION: 98 MS
EKG QTC CALCULATION (BAZETT): 398 MS
EKG R AXIS: -43 DEGREES
EKG T AXIS: 67 DEGREES
EKG VENTRICULAR RATE: 118 BPM
EOSINOPHILS RELATIVE PERCENT: 3 % (ref 1–4)
GAMMA GLOBULIN %: 7 % (ref 9–20)
GAMMA GLOBULIN: 0.4 G/DL (ref 0.5–1.5)
GFR AFRICAN AMERICAN: 36 ML/MIN
GFR NON-AFRICAN AMERICAN: 29 ML/MIN
GFR SERPL CREATININE-BSD FRML MDRD: ABNORMAL ML/MIN/{1.73_M2}
GFR SERPL CREATININE-BSD FRML MDRD: ABNORMAL ML/MIN/{1.73_M2}
GLUCOSE BLD-MCNC: 231 MG/DL (ref 65–105)
GLUCOSE BLD-MCNC: 243 MG/DL (ref 70–99)
GLUCOSE BLD-MCNC: 277 MG/DL (ref 65–105)
GLUCOSE BLD-MCNC: 284 MG/DL (ref 65–105)
GLUCOSE BLD-MCNC: 300 MG/DL (ref 65–105)
HCT VFR BLD CALC: 29 % (ref 36.3–47.1)
HEMOGLOBIN: 9.4 G/DL (ref 11.9–15.1)
IMMATURE GRANULOCYTES: 8 %
LYMPHOCYTES # BLD: 17 % (ref 24–43)
MAGNESIUM: 1.8 MG/DL (ref 1.6–2.6)
MCH RBC QN AUTO: 30 PG (ref 25.2–33.5)
MCHC RBC AUTO-ENTMCNC: 32.4 G/DL (ref 28.4–34.8)
MCV RBC AUTO: 92.7 FL (ref 82.6–102.9)
MONOCYTES # BLD: 8 % (ref 3–12)
NRBC AUTOMATED: 0 PER 100 WBC
P E INTERPRETATION, U: NORMAL
PATHOLOGIST: ABNORMAL
PATHOLOGIST: NORMAL
PATHOLOGIST: NORMAL
PDW BLD-RTO: 13.5 % (ref 11.8–14.4)
PLATELET # BLD: 288 K/UL (ref 138–453)
PLATELET ESTIMATE: ABNORMAL
PMV BLD AUTO: 11.4 FL (ref 8.1–13.5)
POTASSIUM SERPL-SCNC: 3.6 MMOL/L (ref 3.7–5.3)
PROTEIN ELECTROPHORESIS, SERUM: ABNORMAL
RBC # BLD: 3.13 M/UL (ref 3.95–5.11)
RBC # BLD: ABNORMAL 10*6/UL
SEG NEUTROPHILS: 63 % (ref 36–65)
SEGMENTED NEUTROPHILS ABSOLUTE COUNT: 6.36 K/UL (ref 1.5–8.1)
SERUM IFX INTERP: NORMAL
SODIUM BLD-SCNC: 141 MMOL/L (ref 135–144)
SPECIMEN TYPE: NORMAL
TOTAL PROT. SUM,%: 101 % (ref 98–102)
TOTAL PROT. SUM: 5.9 G/DL (ref 6.3–8.2)
TOTAL PROTEIN: 5.9 G/DL (ref 6.4–8.3)
URINE TOTAL PROTEIN: 27 MG/DL
WBC # BLD: 10.1 K/UL (ref 3.5–11.3)
WBC # BLD: ABNORMAL 10*3/UL

## 2021-07-07 PROCEDURE — 85520 HEPARIN ASSAY: CPT

## 2021-07-07 PROCEDURE — 87506 IADNA-DNA/RNA PROBE TQ 6-11: CPT

## 2021-07-07 PROCEDURE — 2500000003 HC RX 250 WO HCPCS: Performed by: EMERGENCY MEDICINE

## 2021-07-07 PROCEDURE — 2580000003 HC RX 258: Performed by: EMERGENCY MEDICINE

## 2021-07-07 PROCEDURE — 6360000002 HC RX W HCPCS: Performed by: FAMILY MEDICINE

## 2021-07-07 PROCEDURE — 6370000000 HC RX 637 (ALT 250 FOR IP): Performed by: NURSE PRACTITIONER

## 2021-07-07 PROCEDURE — APPSS30 APP SPLIT SHARED TIME 16-30 MINUTES: Performed by: NURSE PRACTITIONER

## 2021-07-07 PROCEDURE — 83735 ASSAY OF MAGNESIUM: CPT

## 2021-07-07 PROCEDURE — 6370000000 HC RX 637 (ALT 250 FOR IP): Performed by: INTERNAL MEDICINE

## 2021-07-07 PROCEDURE — 2580000003 HC RX 258: Performed by: INTERNAL MEDICINE

## 2021-07-07 PROCEDURE — 2060000000 HC ICU INTERMEDIATE R&B

## 2021-07-07 PROCEDURE — 82947 ASSAY GLUCOSE BLOOD QUANT: CPT

## 2021-07-07 PROCEDURE — 2580000003 HC RX 258: Performed by: FAMILY MEDICINE

## 2021-07-07 PROCEDURE — 36415 COLL VENOUS BLD VENIPUNCTURE: CPT

## 2021-07-07 PROCEDURE — 99232 SBSQ HOSP IP/OBS MODERATE 35: CPT | Performed by: INTERNAL MEDICINE

## 2021-07-07 PROCEDURE — 85025 COMPLETE CBC W/AUTO DIFF WBC: CPT

## 2021-07-07 PROCEDURE — 80048 BASIC METABOLIC PNL TOTAL CA: CPT

## 2021-07-07 PROCEDURE — 86140 C-REACTIVE PROTEIN: CPT

## 2021-07-07 PROCEDURE — 6370000000 HC RX 637 (ALT 250 FOR IP): Performed by: FAMILY MEDICINE

## 2021-07-07 RX ORDER — SODIUM CHLORIDE 450 MG/100ML
INJECTION, SOLUTION INTRAVENOUS CONTINUOUS
Status: DISCONTINUED | OUTPATIENT
Start: 2021-07-07 | End: 2021-07-08

## 2021-07-07 RX ADMIN — DILTIAZEM HYDROCHLORIDE 60 MG: 60 TABLET, FILM COATED ORAL at 00:30

## 2021-07-07 RX ADMIN — METOPROLOL TARTRATE 75 MG: 50 TABLET, FILM COATED ORAL at 08:57

## 2021-07-07 RX ADMIN — ACETAMINOPHEN 650 MG: 325 TABLET ORAL at 22:55

## 2021-07-07 RX ADMIN — METOPROLOL TARTRATE 75 MG: 50 TABLET, FILM COATED ORAL at 21:00

## 2021-07-07 RX ADMIN — INSULIN LISPRO 4 UNITS: 100 INJECTION, SOLUTION INTRAVENOUS; SUBCUTANEOUS at 21:00

## 2021-07-07 RX ADMIN — DILTIAZEM HYDROCHLORIDE 60 MG: 60 TABLET, FILM COATED ORAL at 23:02

## 2021-07-07 RX ADMIN — DILTIAZEM HYDROCHLORIDE 60 MG: 60 TABLET, FILM COATED ORAL at 17:28

## 2021-07-07 RX ADMIN — APIXABAN 2.5 MG: 2.5 TABLET, FILM COATED ORAL at 08:57

## 2021-07-07 RX ADMIN — CEFTRIAXONE SODIUM 1000 MG: 1 INJECTION, POWDER, FOR SOLUTION INTRAMUSCULAR; INTRAVENOUS at 17:28

## 2021-07-07 RX ADMIN — Medication: at 08:57

## 2021-07-07 RX ADMIN — AZITHROMYCIN MONOHYDRATE 500 MG: 250 TABLET ORAL at 21:00

## 2021-07-07 RX ADMIN — SODIUM CHLORIDE: 4.5 INJECTION, SOLUTION INTRAVENOUS at 12:13

## 2021-07-07 RX ADMIN — PROBIOTIC PRODUCT - TAB 1 TABLET: TAB at 08:57

## 2021-07-07 RX ADMIN — INSULIN LISPRO 6 UNITS: 100 INJECTION, SOLUTION INTRAVENOUS; SUBCUTANEOUS at 17:28

## 2021-07-07 RX ADMIN — DILTIAZEM HYDROCHLORIDE 60 MG: 60 TABLET, FILM COATED ORAL at 05:30

## 2021-07-07 RX ADMIN — INSULIN GLARGINE 30 UNITS: 100 INJECTION, SOLUTION SUBCUTANEOUS at 21:00

## 2021-07-07 RX ADMIN — ALLOPURINOL 100 MG: 100 TABLET ORAL at 08:57

## 2021-07-07 RX ADMIN — INSULIN LISPRO 4 UNITS: 100 INJECTION, SOLUTION INTRAVENOUS; SUBCUTANEOUS at 08:57

## 2021-07-07 RX ADMIN — PRAVASTATIN SODIUM 10 MG: 10 TABLET ORAL at 21:00

## 2021-07-07 RX ADMIN — DILTIAZEM HYDROCHLORIDE 60 MG: 60 TABLET, FILM COATED ORAL at 12:13

## 2021-07-07 RX ADMIN — INSULIN LISPRO 6 UNITS: 100 INJECTION, SOLUTION INTRAVENOUS; SUBCUTANEOUS at 12:13

## 2021-07-07 RX ADMIN — PANTOPRAZOLE SODIUM 40 MG: 40 TABLET, DELAYED RELEASE ORAL at 05:30

## 2021-07-07 ASSESSMENT — PAIN SCALES - GENERAL
PAINLEVEL_OUTOF10: 5
PAINLEVEL_OUTOF10: 0
PAINLEVEL_OUTOF10: 0
PAINLEVEL_OUTOF10: 6
PAINLEVEL_OUTOF10: 6

## 2021-07-07 NOTE — PROGRESS NOTES
Progress Note(Hospital)    [facility] PROGRESSIVE CARE     Admition Status: Inpatient [101]     CC:Anorexia (No appetite for one week along with nausea ) and Fatigue (Pt reports generalized weakness for a couple of days )    HCC:  Patient currently doing pretty well she denies any chest pain or shortness of breath. Did discuss at length EGD and risks and benefits of stopping anticoagulation. Patient is open to proceed.           Current Facility-Administered Medications:     0.45 % sodium chloride infusion, , Intravenous, Continuous, Tom Cheri Granados MD, Last Rate: 50 mL/hr at 07/07/21 1213, New Bag at 07/07/21 1213    allopurinol (ZYLOPRIM) tablet 100 mg, 100 mg, Oral, Daily, Boo Narayan DO, 100 mg at 07/07/21 0857    insulin glargine (LANTUS) injection vial 30 Units, 30 Units, Subcutaneous, Nightly, Boo Narayan DO, 30 Units at 07/06/21 2042    pravastatin (PRAVACHOL) tablet 10 mg, 10 mg, Oral, Nightly, Boo Narayan DO, 10 mg at 07/06/21 2043    lactobacillus (BACID) tablet 1 tablet, 1 tablet, Oral, Daily, Boo Narayan DO, 1 tablet at 07/07/21 0857    azithromycin (ZITHROMAX) tablet 500 mg, 500 mg, Oral, Q24H, Mt Narayan DO, 500 mg at 07/06/21 2043    pantoprazole (PROTONIX) tablet 40 mg, 40 mg, Oral, [facility], Candice Arzola, APRN - CNP, 40 mg at 07/07/21 0530    dilTIAZem (CARDIZEM) tablet 60 mg, 60 mg, Oral, 4 times per day, Harper Cuello MD, 60 mg at 07/07/21 1213    apixaban (ELIQUIS) tablet 2.5 mg, 2.5 mg, Oral, BID, Fredy Zarate MD, 2.5 mg at 07/07/21 0857    metoprolol tartrate (LOPRESSOR) tablet 75 mg, 75 mg, Oral, BID, Fabio Costa DO, 75 mg at 07/07/21 0857    sodium chloride flush 0.9 % injection 5-40 mL, 5-40 mL, Intravenous, 2 times per day, Boo Narayan DO, 10 mL at 07/03/21 2212    sodium chloride flush 0.9 % injection 10 mL, 10 mL, Intravenous, PRN, Boo Narayan DO    0.9 % sodium chloride infusion, 25 mL, Intravenous, PRN, Angelina Narayan, DO    ondansetron (ZOFRAN-ODT) disintegrating tablet 4 mg, 4 mg, Oral, Q8H PRN **OR** ondansetron (ZOFRAN) injection 4 mg, 4 mg, Intravenous, Q6H PRN, Angelina Narayan, DO    polyethylene glycol (GLYCOLAX) packet 17 g, 17 g, Oral, Daily PRN, Angelina Narayan,     acetaminophen (TYLENOL) tablet 650 mg, 650 mg, Oral, Q6H PRN, 650 mg at 07/06/21 2216 **OR** acetaminophen (TYLENOL) suppository 650 mg, 650 mg, Rectal, Q6H PRN, Angelina Narayan DO    cefTRIAXone (ROCEPHIN) 1000 mg IVPB in 50 mL D5W minibag, 1,000 mg, Intravenous, Q24H, Mt Narayan DO, Stopped at 07/06/21 1558    insulin lispro (HUMALOG) injection vial 0-12 Units, 0-12 Units, Subcutaneous, TID WC, Mt Narayan DO, 6 Units at 07/07/21 1213    insulin lispro (HUMALOG) injection vial 0-6 Units, 0-6 Units, Subcutaneous, Nightly, Angelina Narayan DO, 5 Units at 07/06/21 2043    glucose (GLUTOSE) 40 % oral gel 15 g, 15 g, Oral, PRN, Angelina Narayan, DO    dextrose 50 % IV solution, 12.5 g, Intravenous, PRN, Angelina Narayan, DO    glucagon (rDNA) injection 1 mg, 1 mg, Intramuscular, PRN, Angelina Narayan,     dextrose 5 % solution, 100 mL/hr, Intravenous, PRN, Angelina Narayan, DO    metoprolol (LOPRESSOR) injection 5 mg, 5 mg, Intravenous, Q6H PRN, Fabio Costa DO    O:  /64   Pulse 70   Temp 97.5 °F (36.4 °C) (Oral)   Resp 16   Ht 5' (1.524 m)   Wt 229 lb (103.9 kg)   SpO2 98%   BMI 44.72 kg/m²     Intake/Output Summary (Last 24 hours) at 7/7/2021 1338  Last data filed at 7/7/2021 1215  Gross per 24 hour   Intake 2839.31 ml   Output 2850 ml   Net -10.69 ml       Physical Exam  Constitutional:       General: She is not in acute distress. Appearance: She is well-developed. HENT:      Head: Normocephalic and atraumatic. Neck:      Vascular: No JVD. Cardiovascular:      Rate and Rhythm: Normal rate and regular rhythm. Heart sounds: No murmur heard. No friction rub. Pulmonary:      Effort: Pulmonary effort is normal. No respiratory distress. Breath sounds: Normal breath sounds. No stridor. Abdominal:      General: Bowel sounds are normal.      Palpations: Abdomen is soft. Genitourinary:     Comments: No Examined  Skin:     General: Skin is warm and dry. Labs:      Lab Results   Component Value Date/Time    WBC 10.1 07/07/2021 05:43 AM    HGB 9.4 (L) 07/07/2021 05:43 AM    HCT 29.0 (L) 07/07/2021 05:43 AM     07/07/2021 05:43 AM    NEUTROABS 6.36 07/07/2021 05:43 AM     Lab Results   Component Value Date/Time     07/07/2021 05:43 AM    K 3.6 (L) 07/07/2021 05:43 AM     07/07/2021 05:43 AM    CREATININE 1.71 (H) 07/07/2021 05:43 AM    BUN 50 (H) 07/07/2021 05:43 AM    GLUCOSE 243 (H) 07/07/2021 05:43 AM     Lab Results   Component Value Date    PROBNP 13,599 07/03/2021    PROBNP 919 06/21/2019    PROBNP 1,105 06/20/2019     Lab Results   Component Value Date    CRP 85.3 07/06/2021    .7 07/05/2021    .1 07/04/2021     Lab Results   Component Value Date    INR 1.1 07/04/2021     No results for input(s): PROCAL in the last 72 hours.       Rad:    Echocardiogram 2D W M-Mode    Result Date: 7/6/2021  [facility] Transthoracic Echocardiography Report (TTE)  Patient Name Tyerse Knowles Date of Study               07/06/2021               M   Date of      1950  Gender                      Female  Birth   Age          66 year(s)  Race                           Room Number  1004        Height:                     60 inch, 152.4 cm   Corporate ID [id number]    Weight:                     229 pounds, 103.9 kg  #   Patient Yuri Arias [de-identified]   BSA:          1.98 m^2      BMI:      44.72  #                                                              kg/m^2   MR #         [id number]     Sonographer                 [facility]   Accession #  [id number]  Interpreting Physician      Alena Pretty   Fellow                   Referring Nurse                           Practitioner   Interpreting             Referring Physician         [address]  Fellow  Type of Study   TTE procedure:2D Echocardiogram, M-Mode, Doppler, Color Doppler. Procedure Date Date: 07/06/2021 Start: 11:56 AM Study Location: [address] [place] [address] Technical Quality: Adequate visualization Indications:Atrial fibrillation. History / Tech. Comments: Procedure explained to patient. Patient Status: Inpatient Height: 60 inches Weight: 229.01 pounds BSA: 1.98 m^2 BMI: 44.72 kg/m^2 CONCLUSIONS Summary Left ventricle is normal in size Global left ventricular systolic function is normal Estimated ejection fraction is 65 % . Left atrium is moderately dilated. No significant valvular regurgitation or stenosis seen. No pericardial effusion seen. Normal aortic root dimension. Signature ----------------------------------------------------------------------------  Electronically signed by Antonina Ramirez on 07/06/2021  02:33 PM ---------------------------------------------------------------------------- ----------------------------------------------------------------------------  Electronically signed by Eden Thomas(Interpreting physician) on 07/06/2021  10:52 PM ---------------------------------------------------------------------------- FINDINGS Left Atrium Left atrium is moderately dilated. Left Ventricle Left ventricle is normal in size Global left ventricular systolic function is normal Estimated ejection fraction is 65 % . Right Atrium Right atrium is normal in size. Right Ventricle Normal right ventricular size and function. Mitral Valve Mitral annular calcification is seen. Trivial mitral regurgitation. Aortic Valve Normal aortic valve structure and function without stenosis or regurgitation. Tricuspid Valve Normal tricuspid valve structure and function. No tricuspid regurgitation was seen.  Pulmonic Valve The pulmonic valve is normal in structure. Trivial pulmonic insufficiency. Pericardial Effusion No pericardial effusion seen. Miscellaneous Normal aortic root dimension. M-mode / 2D Measurements & Calculations:   LVIDd:5.4 cm(3.7 - 5.6 cm)       Diastolic MPNPXE:377 ml  SXPSX:9.1 cm(2.2 - 4.0 cm)       Systolic HDDYRR:00.1 ml  VOTK:9.2 cm(0.6 - 1.1 cm)        Aortic Root:3 cm(2.0 - 3.7 cm)  LVPWd:1.1 cm(0.6 - 1.1 cm)       LA Dimension: 4.5 cm(1.9 - 4.0 cm)  Fractional Shortenin.74 %    LA volume/Index: 64.4 ml /33m^2  Calculated LVEF (%): 79.11 %   Mitral:                                    Aortic   Valve Area (P1/2-Time): 3.86 cm^2          Peak Velocity: 1.26 m/s  Peak E-Wave: 1.37 m/s                      Peak Gradient: 6.35 mmHg   Peak Gradient: 7.51 mmHg  Deceleration Time: 197 msec  P1/2t: 57 msec  Septal Wall E' velocity:0.06 m/s Lateral Wall E' velocity:0.09 m/s    CT ABDOMEN PELVIS WO CONTRAST Additional Contrast? None    Result Date: 7/3/2021  EXAMINATION: CT OF THE ABDOMEN AND PELVIS WITHOUT CONTRAST 7/3/2021 3:05 pm TECHNIQUE: CT of the abdomen and pelvis was performed without the administration of intravenous contrast. Multiplanar reformatted images are provided for review. Dose modulation, iterative reconstruction, and/or weight based adjustment of the mA/kV was utilized to reduce the radiation dose to as low as reasonably achievable. COMPARISON: None. HISTORY: ORDERING SYSTEM PROVIDED HISTORY: sepsis, unclear eitology persistent diarrhea/abd cramping TECHNOLOGIST PROVIDED HISTORY: sepsis, unclear eitology persistent diarrhea/abd cramping Decision Support Exception - unselect if not a suspected or confirmed emergency medical condition->Emergency Medical Condition (MA) Reason for Exam: Sepsis. Acuity: Acute Type of Exam: Initial FINDINGS: Lower Chest: Patchy consolidative changes seen in the left lower lobe.   Tiny left-sided pleural effusion is seen Organs: Adrenal glands appear normal. No splenomegaly. No perisplenic fluid There is a subtle lobular contour to the liver. There are clips from prior cholecystectomy No peripancreatic fluid. No peripancreatic inflammatory change No stones or hydronephrosis on the right. No stones or hydronephrosis on the left No peripancreatic fluid. No peripancreatic inflammatory change. Trace left-sided pleural effusion is seen GI/Bowel: No significant small bowel distention noted. Mild stool load seen in the colon. Scattered colonic diverticula are seen. Colon is incompletely distended, accentuating its wall thickness. No significant pericolonic stranding Pelvis: No free fluid in pelvis. No pelvic adenopathy. Bladder is incompletely distended Peritoneum/Retroperitoneum: Atherosclerotic change seen in aorta. Tiny retroperitoneal nodes are seen. Bones/Soft Tissues: There is diastases of the rectus muscles. Tiny periumbilical hernia containing fat is seen     Left lower lobe pneumonia with small left-sided pleural effusion Scattered areas of colonic wall thickening are seen, either due to the partially contracted state of the colon or wall thickening from early colitis     CT CHEST WO CONTRAST    Result Date: 7/4/2021  EXAMINATION: CT OF THE CHEST WITHOUT CONTRAST 7/4/2021 12:15 pm TECHNIQUE: CT of the chest was performed without the administration of intravenous contrast. Multiplanar reformatted images are provided for review. Dose modulation, iterative reconstruction, and/or weight based adjustment of the mA/kV was utilized to reduce the radiation dose to as low as reasonably achievable. COMPARISON: None. HISTORY: ORDERING SYSTEM PROVIDED HISTORY: Pneumonia TECHNOLOGIST PROVIDED HISTORY: NO IV CONTRAST Pneumonia Reason for Exam: Pneumonia seen on prior CT Scan. Acuity: Acute Type of Exam: Initial FINDINGS: Mediastinum: Calcifications are seen in the thyroid.   A few hypodense nodules are seen measuring 9 mm in size or less, for which no specific imaging follow-up is recommended based on size Small mediastinal and hilar nodes are noted. Mild coronary artery calcification is seen. Aortic valve calcification is seen. Trace pericardial fluid is seen. Small hiatal hernia seen. There is nonspecific thickening at the GE junction Lungs/pleura: Bandlike opacity seen in the right middle lobe. Bandlike opacity seen in the right lower lobe. No focal consolidation is seen in the right lung. Mild traction bronchiectasis is seen in the right middle lobe. Patchy consolidation is seen in the left lower lobe. Small left-sided pleural effusion is seen. There is adjacent left basilar opacity. Upper Abdomen: Right adrenal gland is normal.  Left adrenal gland is normal. Atherosclerotic change seen in abdominal aorta. There is subtle lobular contour to the liver. Mild stool load is seen in the colon. .  A few scattered colonic diverticula are seen. Soft Tissues/Bones: Spurring is seen in the spine. Spurring is seen in the shoulder joints. Left lower lobe pneumonia with small left-sided pleural effusion. No focal consolidation on the right. XR CHEST PORTABLE    Result Date: 7/3/2021  EXAMINATION: ONE XRAY VIEW OF THE CHEST 7/3/2021 2:26 pm COMPARISON: Chest radiograph performed 06/18/2019. HISTORY: ORDERING SYSTEM PROVIDED HISTORY: chest pain TECHNOLOGIST PROVIDED HISTORY: chest pain Reason for Exam: Pt states she's feeling ill x 6 days. Fatigue, chest pain. AP UPRIGHT PORTABLE Acuity: Acute Type of Exam: Subsequent/Follow-up FINDINGS: There is no acute consolidation or effusion. There is no pneumothorax. The mediastinal structures are unremarkable. The upper abdomen is unremarkable. The extrathoracic soft tissues are unremarkable. There is no acute osseous abnormality. No acute cardiopulmonary process.          A:  Active Hospital Problems    Diagnosis Date Noted    Abnormal CT scan, chest [R93.89]     Anemia [D64.9]     Diarrhea [R19.7]     Nausea [R11.0]     Pneumonia [J18.9] 07/03/2021    Type 2 diabetes mellitus, without long-term current use of insulin [facility] [E11.9] 06/17/2019    CKD (chronic kidney disease) stage 3, GFR 30-59 ml/min (HCC) [N18.30] 03/21/2018           P:  1. At this point in time I think the patient is medically clear for endoscopy it will be up to cardiology to give their cardiac clearance. If they are okay with stopping anticoagulation that would be fine patient is rate controlled. 2.  We did asked nursing to speak with cardiology. I think this patient would benefit from evaluation especially of the upper GI. Kyle Narayan,   1:38 PM  7/7/2021    This note was created with the assistance of a speech-recognition program. Although the intention is to generate a document that actually reflects the content of the visit, no guarantees can be provided that every mistake has been identified and corrected by editing.

## 2021-07-07 NOTE — PLAN OF CARE
Problem: Falls - Risk of:  Goal: Will remain free from falls  Description: Will remain free from falls  7/7/2021 1037 by Delvis Cruz RN  Outcome: Ongoing  Siderails up x 2  Hourly rounding. Call light in reach. Instructed to call for assist before attempting out of bed. Remains free from falls and accidental injury at this time. Floor free from obstacles, and bed is locked and in lowest position. Adequate lighting provided. Bed alarm on. Fall sticker on wristband.  Fall Sign posted in doorway       Problem: Falls - Risk of:  Goal: Absence of physical injury  Description: Absence of physical injury  7/7/2021 1037 by Delvis Cruz RN  Outcome: Ongoing     Problem: Pain:  Goal: Pain level will decrease  Description: Pain level will decrease  Outcome: Ongoing     Problem: Pain:  Goal: Control of acute pain  Description: Control of acute pain  7/7/2021 1037 by Delvis Cruz RN  Outcome: Ongoing     Problem: Pain:  Goal: Control of chronic pain  Description: Control of chronic pain  Outcome: Ongoing     Problem: IP BALANCE  Goal: LTG - patient will maintain standing balance to allow for completion of daily activities  Outcome: Ongoing     Problem: Breathing Pattern - Ineffective:  Goal: Ability to achieve and maintain a regular respiratory rate will improve  Description: Ability to achieve and maintain a regular respiratory rate will improve  Outcome: Ongoing   Patient on room air, saturating >92%  Little to no dyspnea on exertion

## 2021-07-07 NOTE — FLOWSHEET NOTE
SPIRITUAL CARE PROGRESS NOTE     Spiritual Assessment: Pt was calm and approachable upon  arrival.      Intervention: [address] facilitated pt exploration of feelings, thoughts, and concerns through active listening.  discussed with pt her illness and its impact.  prayed with pt.  Outcome: Pt engaged in conversation and shared reminiscences, becoming tearful at times. Pt expressed feelings and concerns. Pt expressed gratitude for visit and prayer. 07/07/21 1638   Encounter Summary   Services provided to: Patient   Referral/Consult From: [facility] Family members   Continue Visiting   (7/7/21)   Complexity of Encounter Moderate   Length of Encounter 45 minutes   Spiritual Assessment Completed Yes   Routine   Type Follow up   Assessment Calm; Approachable   Intervention Explored feelings, thoughts, concerns; Active listening;Explored coping resources;Prayer;Sustaining presence/ Ministry of presence; Discussed illness/injury and it's impact   Outcome Expressed gratitude;Engaged in conversation;Expressed feelings/needs/concerns; Tearful; Shared reminiscences; Receptive

## 2021-07-07 NOTE — PROGRESS NOTES
[facility]   Progress Note                   Date:   7/7/2021  Patient name: Rocky Mckoy  Date of admission:  7/3/2021  1:37 PM  MRN:   [id number]  YOB: 1950  PCP: Lender Canavan, MD    Reason for Admission: Pneumonia and A.fib with RVR. Subjective:       Clinical Changes / Abnormalities: Feeling better today, less short of breath with no chest pain. Rate is borderline controlled.  -1 L since admission. She was noted to have esophageal thickening and symptoms of nausea and needs to undergo GI procedures. Medications:   Scheduled Meds:   allopurinol  100 mg Oral Daily    insulin glargine  30 Units Subcutaneous Nightly    pravastatin  10 mg Oral Nightly    lactobacillus  1 tablet Oral Daily    azithromycin  500 mg Oral Q24H    pantoprazole  40 mg Oral QAM AC    dilTIAZem  60 mg Oral 4 times per day    apixaban  2.5 mg Oral BID    metoprolol tartrate  75 mg Oral BID    sodium chloride flush  5-40 mL Intravenous 2 times per day    cefTRIAXone (ROCEPHIN) IV  1,000 mg Intravenous Q24H    insulin lispro  0-12 Units Subcutaneous TID WC    insulin lispro  0-6 Units Subcutaneous Nightly     Continuous Infusions:   sodium chloride 50 mL/hr at 07/07/21 1213    sodium chloride      dextrose       CBC:   Recent Labs     07/05/21  0515 07/06/21  0211 07/07/21  0543   WBC 8.2 11.1 10.1   HGB 10.1* 9.9* 9.4*    290 288     BMP:    Recent Labs     07/05/21  0515 07/06/21  0211 07/07/21  0543   * 139 141   K 3.4* 3.8 3.6*    102 103   CO2 19* 23 25   BUN 70* 67* 50*   CREATININE 2.49* 2.42* 1.71*   GLUCOSE 217* 156* 243*     INR:   No results for input(s): INR in the last 72 hours.     Objective:   Vitals: BP (!) 137/59   Pulse 81   Temp 98.1 °F (36.7 °C) (Oral)   Resp 16   Ht 5' (1.524 m)   Wt 229 lb (103.9 kg)   SpO2 97%   BMI 44.72 kg/m²   General appearance: alert and cooperative with exam  HEENT: Head: Normocephalic, no lesions, without obvious abnormality. Neck: No JVD, supple. Lungs: clear to auscultation bilaterally  Heart: Irregularly irregular rate and rhythm, S1, S2 normal, no murmur, click, rub or gallop  Abdomen: soft, non-tender; bowel sounds normal; no masses,  no organomegaly  Extremities: extremities normal, atraumatic, no cyanosis or edema  Neurologic: Mental status: Alert, oriented, thought content appropriate    Patient Active Problem List:     HTN (hypertension)     Hypomagnesemia     CKD (chronic kidney disease) stage 3, GFR 30-59 ml/min ([facility])     Vitamin D deficiency     Reactive airway disease that is not asthma     Type 2 diabetes mellitus, without long-term current use of insulin ([facility])     Mixed hyperlipidemia     Nontoxic single thyroid nodule     Pneumonia     Abnormal CT scan, chest     Anemia     Diarrhea    Echocardiogram 7/7/2021:  Left ventricle is normal in size  Global left ventricular systolic function is normal  Estimated ejection fraction is 65 % . Left atrium is moderately dilated. No significant valvular regurgitation or stenosis seen. No pericardial effusion seen. Normal aortic root dimension. Assessment / Acute Cardiac Problems:     1. New onset A. fib with RVR currently on rate control and anticoagulation  2. Minimal troponin elevation consistent with type II MI due to BRANDON on CKD and pneumonia  3. Hypertension  4. Diabetes mellitus  5. Hyperlipidemia  6. Pneumonia  7. BRANDON on CKD  8. Preserved LV systolic function on echo as above with no significant valvular abnormalities    Plan of Treatment:     1. Continue rate control and anticoagulation  2. Continue beta-blockers and calcium channel blockers for rate control. 3. Currently on heparin drip for anticoagulation  4. We will need long-term anticoagulation with Eliquis on discharge  5. Will consider cardioversion as an outpatient after adequate anticoagulation. 6. Okay to proceed with endoscopy with low cardiac risk.   May hold Eliquis as needed.       Electronically signed by Nayana Ivey MD on 7/7/2021 at 4:52 PM      [facility]  [phone]

## 2021-07-07 NOTE — PROGRESS NOTES
[facility]    Gastroenterology Daily Progress Note      Patient:   Heather Scott   :    1950   [facility]  Date:     2021  Consultant:   GERONIMO Cason CNP, CNP      SUBJECTIVE  66 y.o. female admitted 7/3/2021 with Pneumonia [J18.9] and seen for anemia and abnormal ct findings. The pt was seen and examined. She denies nausea and abdominal pain, she is tolerating a regular diet. hgb 9.4. reports improvement in her breathing.          OBJECTIVE  Scheduled Meds:   allopurinol  100 mg Oral Daily    insulin glargine  30 Units Subcutaneous Nightly    pravastatin  10 mg Oral Nightly    lactobacillus  1 tablet Oral Daily    azithromycin  500 mg Oral Q24H    pantoprazole  40 mg Oral QAM AC    dilTIAZem  60 mg Oral 4 times per day    apixaban  2.5 mg Oral BID    metoprolol tartrate  75 mg Oral BID    sodium chloride flush  5-40 mL Intravenous 2 times per day    cefTRIAXone (ROCEPHIN) IV  1,000 mg Intravenous Q24H    insulin lispro  0-12 Units Subcutaneous TID WC    insulin lispro  0-6 Units Subcutaneous Nightly       Vital Signs:  BP (!) 140/68   Pulse 99   Temp 98.1 °F (36.7 °C) (Axillary)   Resp 16   Ht 5' (1.524 m)   Wt 229 lb (103.9 kg)   SpO2 97%   BMI 44.72 kg/m²      Physical Exam:   General Appearance: alert and oriented to person, place and time, well-developed and well-nourished, in no acute distress  Skin: warm and dry, no rash or erythema  Head: normocephalic and atraumatic  Eyes: pupils equal, round, and reactive to light, extraocular eye movements intact, conjunctivae normal  ENT: hearing grossly normal bilaterally  Neck: neck supple and non tender without mass, no thyromegaly or thyroid nodules, no cervical lymphadenopathy   Pulmonary/Chest: clear to auscultation bilaterally- no wheezes, rales or rhonchi, normal air movement, no respiratory distress  Cardiovascular: normal rate, regular rhythm, normal S1 and S2, no murmurs, rubs, clicks or gallops, distal pulses intact, no carotid bruits  Abdomen: soft, obese  non-tender, non-distended, normal bowel sounds, no masses or organomegaly  Extremities: no cyanosis, clubbing or edema  Musculoskeletal: normal range of motion, no joint swelling, deformity or tenderness  Neurologic: no cranial nerve deficit and muscle strength normal    Lab and Imaging Review     CBC  Recent Labs     07/05/21  0515 07/06/21  0211 07/07/21  0543   WBC 8.2 11.1 10.1   HGB 10.1* 9.9* 9.4*   HCT 30.2* 30.6* 29.0*   MCV 89.6 91.1 92.7    290 288       BMP  Recent Labs     07/05/21  0515 07/06/21  0211 07/07/21  0543   * 139 141   K 3.4* 3.8 3.6*    102 103   CO2 19* 23 25   BUN 70* 67* 50*   CREATININE 2.49* 2.42* 1.71*   GLUCOSE 217* 156* 243*   CALCIUM 8.1* 8.7 8.2*       LFTS  Recent Labs     07/04/21  1902   PROT 5.9*       ANEMIA STUDIES  Recent Labs     07/06/21  1431   LABIRON 30   TIBC 192*   FERRITIN 179*   [id number] >2000*   FOLATE 14.8     FINDINGS:ct abd 7/3/21   Lower Chest: Patchy consolidative changes seen in the left lower lobe.  Tiny   left-sided pleural effusion is seen       Organs:       Adrenal glands appear normal. No splenomegaly. No perisplenic fluid       There is a subtle lobular contour to the liver.  There are clips from prior   cholecystectomy       No peripancreatic fluid.  No peripancreatic inflammatory change       No stones or hydronephrosis on the right.  No stones or hydronephrosis on the   left       No peripancreatic fluid.  No peripancreatic inflammatory change.  Trace   left-sided pleural effusion is seen       GI/Bowel: No significant small bowel distention noted.  Mild stool load seen   in the colon.  Scattered colonic diverticula are seen.  Colon is incompletely   distended, accentuating its wall thickness.  No significant pericolonic   stranding       Pelvis: No free fluid in pelvis.  No pelvic adenopathy.  Bladder is   incompletely distended     Peritoneum/Retroperitoneum: Atherosclerotic change seen in aorta.  Tiny   retroperitoneal nodes are seen.       Bones/Soft Tissues: There is diastases of the rectus muscles.  Tiny   periumbilical hernia containing fat is seen           Impression   Left lower lobe pneumonia with small left-sided pleural effusion       Scattered areas of colonic wall thickening are seen, either due to the   partially contracted state of the colon or wall thickening from early colitis               FINDINGS:ct chest 7/4/21   Mediastinum: Calcifications are seen in the thyroid.  A few hypodense nodules   are seen measuring 9 mm in size or less, for which no specific imaging   follow-up is recommended based on size       Small mediastinal and hilar nodes are noted.  Mild coronary artery   calcification is seen.  Aortic valve calcification is seen.  Trace   pericardial fluid is seen.  Small hiatal hernia seen. Marco A Honour is nonspecific   thickening at the GE junction       Lungs/pleura: Bandlike opacity seen in the right middle lobe.  Bandlike   opacity seen in the right lower lobe.  No focal consolidation is seen in the   right lung.  Mild traction bronchiectasis is seen in the right middle lobe.       Patchy consolidation is seen in the left lower lobe.  Small left-sided   pleural effusion is seen. Marco A Honour is adjacent left basilar opacity.       Upper Abdomen: Right adrenal gland is normal.  Left adrenal gland is normal.   Atherosclerotic change seen in abdominal aorta.  There is subtle lobular   contour to the liver.  Mild stool load is seen in the colon. .  A few   scattered colonic diverticula are seen.       Soft Tissues/Bones: Spurring is seen in the spine.  Spurring is seen in the   shoulder joints.           Impression   Left lower lobe pneumonia with small left-sided pleural effusion.  No focal   consolidation on the right.           ASSESSMENT/plan  1.  Anemia, ge junction thickening per imaging  -likely egd and colonoscopy on Friday, would request medical clearance due to the pneumonia-eliquis would need to be held starting today  -trend hh and keep hgb >7  -continue ppi    2.pneumonia-on abx per primary service          This plan was formulated in collaboration with Dr. Kenny Clemente . Electronically signed by: GERONIMO Simmons CNP on 7/7/2021 at 7:46 AM       Attending Physician Statement  I have discussed the care of Harley King and   I have examined the patient myselft independently, and taken ros and hpi , including pertinent history and exam findings,  with the author of this note . I have reviewed the key elements of all parts of the encounter with the nurse practitioner/resident.     I agree with the assessment, plan and orders as documented by the above health care provider       GE junction thickening and: Thickening per CAT scan  Waiting for clearance by other consultant from the pneumonia standpoint and to withhold the anticoagulation  Tentatively EGD colonoscopy on Friday    Electronically signed by Cordell Byrne MD

## 2021-07-07 NOTE — PROGRESS NOTES
CLINICAL PHARMACY NOTE: MEDS TO BEDS    Total # of Prescriptions Filled: 1   The following medications were delivered to the patient:  · ELIQUIS 2.5MG    Additional Documentation:

## 2021-07-07 NOTE — PROGRESS NOTES
Reguaridng EGD, possible colonscopy 7/9    Per GI's request/order-Patient was medically cleared by  for procedures   Okay with  to hold Eliquis until procedures - it was placed on hold      updated of above

## 2021-07-07 NOTE — PROGRESS NOTES
Nephrology Progress Note    Subjective/   66y.o. year old female who we are seeing in consultation for acute kidney injury. Interval history:  Patient feels better. She has improving appetite no diarrhea or fever or nausea. Urine output is improving with 3.2 L yesterday, 1200 ml since morning  Patient is hemodynamically stable with controlled heart rate. Scr 1.7 mg/dl  DARI negative, ANCA negative    History of Present Illness: This is a 66 y.o. female with past medical history chronic kidney disease 3 a with baseline creatinine of 1.2-1.4 mg/dL, type 2 diabetes, essential hypertension. Patient presented to the hospital with complaints of lethargy chills not feeling good poor oral intake and decrease in appetite for about 1 week nausea no vomiting. Patient did have few loose stools, patient also complained of cough and saw some streaks of blood twice  Patient also noted tachycardia,notedto be Afib. CT abdomen and pelvis showed left lower lobe pneumonia with small left-sided pleural effusion, scattered areas of colonic wall thickening. Pt denies any history of  prolonged NSAID use. Patient denies dysuria, gross hematuria, flank pain, nocturia, urgency, passing frothy urine or urinary incontinence. There has been no recent exposure to IV contrast.There is no history  of paraprotein disease. Pt denies any history of recurrent UTI or kidney stones. Medication review shows use of ARB's, hydrochlorothiazide, Metformin  Blood pressure stable no hypotension noted.         Objective/     Vitals:    07/06/21 2024 07/06/21 2310 07/07/21 0403 07/07/21 0809   BP: 127/76 139/67 (!) 140/68 135/66   Pulse: 105 84 99 77   Resp: 16 16 16 16   Temp: 97.5 °F (36.4 °C) 98.1 °F (36.7 °C) 98.1 °F (36.7 °C) 98.1 °F (36.7 °C)   TempSrc: Oral Temporal Axillary Oral   SpO2: 97% 94% 97% 96%   Weight:       Height:         24HR INTAKE/OUTPUT:      Intake/Output Summary (Last 24 hours) at 7/7/2021 1144  Last data filed at 7/7/2021 9143  Gross per 24 hour   Intake 2396.31 ml   Output 3150 ml   Net -753.69 ml     Patient Vitals for the past 96 hrs (Last 3 readings):   Weight   07/03/21 1327 229 lb (103.9 kg)       Constitutional:  Alert, awake, no apparent distress  Cardiovascular:  S1, S2 without m/r/g  Respiratory: Crackles left lower lung base otherwise lungs are clear  Abdomen: +bs, soft, nt  Ext:  LE edema    Data/  Recent Labs     07/05/21 0515 07/06/21 0211 07/07/21  0543   WBC 8.2 11.1 10.1   HGB 10.1* 9.9* 9.4*   HCT 30.2* 30.6* 29.0*   MCV 89.6 91.1 92.7    290 288     Recent Labs     07/05/21 0515 07/06/21 0211 07/07/21  0543   * 139 141   K 3.4* 3.8 3.6*    102 103   CO2 19* 23 25   GLUCOSE 217* 156* 243*   MG 2.0 2.0 1.8   BUN 70* 67* 50*   CREATININE 2.49* 2.42* 1.71*   LABGLOM 19* 20* 29*   GFRAA 23* 24* 36*         Assessment/   1. Acute kidney injury, nonoliguric on CKD 3 most likely secondary to prerenal azotemia, from poor oral intake use of ARB and diuretics and hemodynamic related to A. fib with RVR-renal function is slowly improving-serum creatinine 2.49 mg/dL    2. Type 2 diabetes    3. Essential hypertension-controlled    4. CKD stage III with baseline creatinine of 1.2 to 1.4 mg/dL most likely secondary to diabetic nephropathy    5. Urinary tract infection with pyuria    6. CT finding of suspected lower lobe pneumonia-on IV antibiotics       7. Hypokalemia      8. gap metabolic acidosis secondary to BRANDON-improving  Plan/   Change IVF to 0.45% saline at 50 ml /hr  Kidney function improving, getting close to her baseline, can DC IVF tomorrow if not discharged today. Replace kcl per sliding scale  Strict I's and O's  Continue Abx per primary team.  Avoid nephrotoxic drugs. Discharge planning in progress.     Sonu Candelario MD    Nephrologist

## 2021-07-08 LAB
ABSOLUTE EOS #: 0.35 K/UL (ref 0–0.4)
ABSOLUTE IMMATURE GRANULOCYTE: 0.93 K/UL (ref 0–0.3)
ABSOLUTE LYMPH #: 1.86 K/UL (ref 1–4.8)
ABSOLUTE MONO #: 0.81 K/UL (ref 0.2–0.8)
ANION GAP SERPL CALCULATED.3IONS-SCNC: 13 MMOL/L (ref 9–17)
BASOPHILS # BLD: 1 %
BASOPHILS ABSOLUTE: 0.12 K/UL (ref 0–0.2)
BUN BLDV-MCNC: 41 MG/DL (ref 8–23)
BUN/CREAT BLD: 28 (ref 9–20)
C-REACTIVE PROTEIN: 27.6 MG/L (ref 0–5)
CALCIUM SERPL-MCNC: 8.2 MG/DL (ref 8.6–10.4)
CAMPYLOBACTER PCR: NORMAL
CHLORIDE BLD-SCNC: 105 MMOL/L (ref 98–107)
CO2: 22 MMOL/L (ref 20–31)
CREAT SERPL-MCNC: 1.49 MG/DL (ref 0.5–0.9)
DIFFERENTIAL TYPE: ABNORMAL
E COLI ENTEROTOXIGENIC PCR: NORMAL
EOSINOPHILS RELATIVE PERCENT: 3 % (ref 1–4)
GFR AFRICAN AMERICAN: 42 ML/MIN
GFR NON-AFRICAN AMERICAN: 34 ML/MIN
GFR SERPL CREATININE-BSD FRML MDRD: ABNORMAL ML/MIN/{1.73_M2}
GFR SERPL CREATININE-BSD FRML MDRD: ABNORMAL ML/MIN/{1.73_M2}
GLUCOSE BLD-MCNC: 224 MG/DL (ref 65–105)
GLUCOSE BLD-MCNC: 227 MG/DL (ref 70–99)
GLUCOSE BLD-MCNC: 256 MG/DL (ref 65–105)
GLUCOSE BLD-MCNC: 308 MG/DL (ref 65–105)
GLUCOSE BLD-MCNC: 324 MG/DL (ref 65–105)
HCT VFR BLD CALC: 28.8 % (ref 36.3–47.1)
HEMOGLOBIN: 9.4 G/DL (ref 11.9–15.1)
IMMATURE GRANULOCYTES: 8 %
LYMPHOCYTES # BLD: 16 % (ref 24–44)
MAGNESIUM: 1.6 MG/DL (ref 1.6–2.6)
MCH RBC QN AUTO: 30.3 PG (ref 25.2–33.5)
MCHC RBC AUTO-ENTMCNC: 32.6 G/DL (ref 28.4–34.8)
MCV RBC AUTO: 92.9 FL (ref 82.6–102.9)
MONOCYTES # BLD: 7 % (ref 1–7)
NRBC AUTOMATED: 0.2 PER 100 WBC
PDW BLD-RTO: 13.3 % (ref 11.8–14.4)
PLATELET # BLD: 314 K/UL (ref 138–453)
PLATELET ESTIMATE: ABNORMAL
PLESIOMONAS SHIGELLOIDES PCR: NORMAL
PMV BLD AUTO: 11 FL (ref 8.1–13.5)
POTASSIUM SERPL-SCNC: 3.5 MMOL/L (ref 3.7–5.3)
RBC # BLD: 3.1 M/UL (ref 3.95–5.11)
RBC # BLD: ABNORMAL 10*6/UL
SALMONELLA PCR: NORMAL
SEG NEUTROPHILS: 65 % (ref 36–66)
SEGMENTED NEUTROPHILS ABSOLUTE COUNT: 7.53 K/UL (ref 1.8–7.7)
SHIGATOXIN GENE PCR: NORMAL
SHIGELLA SP PCR: NORMAL
SODIUM BLD-SCNC: 140 MMOL/L (ref 135–144)
SPECIMEN DESCRIPTION: NORMAL
VIBRIO PCR: NORMAL
WBC # BLD: 11.6 K/UL (ref 3.5–11.3)
WBC # BLD: ABNORMAL 10*3/UL
YERSINIA ENTEROCOLITICA PCR: NORMAL

## 2021-07-08 PROCEDURE — 6360000002 HC RX W HCPCS: Performed by: INTERNAL MEDICINE

## 2021-07-08 PROCEDURE — 6370000000 HC RX 637 (ALT 250 FOR IP): Performed by: FAMILY MEDICINE

## 2021-07-08 PROCEDURE — 6370000000 HC RX 637 (ALT 250 FOR IP): Performed by: NURSE PRACTITIONER

## 2021-07-08 PROCEDURE — 80048 BASIC METABOLIC PNL TOTAL CA: CPT

## 2021-07-08 PROCEDURE — 85025 COMPLETE CBC W/AUTO DIFF WBC: CPT

## 2021-07-08 PROCEDURE — 36415 COLL VENOUS BLD VENIPUNCTURE: CPT

## 2021-07-08 PROCEDURE — 2580000003 HC RX 258: Performed by: FAMILY MEDICINE

## 2021-07-08 PROCEDURE — 82947 ASSAY GLUCOSE BLOOD QUANT: CPT

## 2021-07-08 PROCEDURE — 83735 ASSAY OF MAGNESIUM: CPT

## 2021-07-08 PROCEDURE — 99232 SBSQ HOSP IP/OBS MODERATE 35: CPT | Performed by: INTERNAL MEDICINE

## 2021-07-08 PROCEDURE — 6370000000 HC RX 637 (ALT 250 FOR IP): Performed by: INTERNAL MEDICINE

## 2021-07-08 PROCEDURE — 6360000002 HC RX W HCPCS: Performed by: FAMILY MEDICINE

## 2021-07-08 PROCEDURE — 2060000000 HC ICU INTERMEDIATE R&B

## 2021-07-08 PROCEDURE — 86140 C-REACTIVE PROTEIN: CPT

## 2021-07-08 PROCEDURE — APPSS30 APP SPLIT SHARED TIME 16-30 MINUTES: Performed by: NURSE PRACTITIONER

## 2021-07-08 RX ORDER — POTASSIUM CHLORIDE 20 MEQ/1
40 TABLET, EXTENDED RELEASE ORAL PRN
Status: DISCONTINUED | OUTPATIENT
Start: 2021-07-08 | End: 2021-07-10 | Stop reason: HOSPADM

## 2021-07-08 RX ORDER — MAGNESIUM SULFATE 1 G/100ML
1000 INJECTION INTRAVENOUS PRN
Status: DISCONTINUED | OUTPATIENT
Start: 2021-07-08 | End: 2021-07-10 | Stop reason: HOSPADM

## 2021-07-08 RX ORDER — POTASSIUM CHLORIDE 7.45 MG/ML
10 INJECTION INTRAVENOUS PRN
Status: DISCONTINUED | OUTPATIENT
Start: 2021-07-08 | End: 2021-07-10 | Stop reason: HOSPADM

## 2021-07-08 RX ADMIN — DILTIAZEM HYDROCHLORIDE 60 MG: 60 TABLET, FILM COATED ORAL at 12:53

## 2021-07-08 RX ADMIN — PRAVASTATIN SODIUM 10 MG: 10 TABLET ORAL at 20:27

## 2021-07-08 RX ADMIN — METOPROLOL TARTRATE 75 MG: 50 TABLET, FILM COATED ORAL at 20:27

## 2021-07-08 RX ADMIN — INSULIN LISPRO 6 UNITS: 100 INJECTION, SOLUTION INTRAVENOUS; SUBCUTANEOUS at 17:42

## 2021-07-08 RX ADMIN — ALLOPURINOL 100 MG: 100 TABLET ORAL at 09:29

## 2021-07-08 RX ADMIN — SODIUM CHLORIDE, PRESERVATIVE FREE 10 ML: 5 INJECTION INTRAVENOUS at 09:32

## 2021-07-08 RX ADMIN — CEFTRIAXONE SODIUM 1000 MG: 1 INJECTION, POWDER, FOR SOLUTION INTRAMUSCULAR; INTRAVENOUS at 16:39

## 2021-07-08 RX ADMIN — MAGNESIUM SULFATE HEPTAHYDRATE 1000 MG: 1 INJECTION, SOLUTION INTRAVENOUS at 17:42

## 2021-07-08 RX ADMIN — INSULIN GLARGINE 30 UNITS: 100 INJECTION, SOLUTION SUBCUTANEOUS at 20:28

## 2021-07-08 RX ADMIN — DILTIAZEM HYDROCHLORIDE 60 MG: 60 TABLET, FILM COATED ORAL at 23:55

## 2021-07-08 RX ADMIN — PANTOPRAZOLE SODIUM 40 MG: 40 TABLET, DELAYED RELEASE ORAL at 06:09

## 2021-07-08 RX ADMIN — DILTIAZEM HYDROCHLORIDE 60 MG: 60 TABLET, FILM COATED ORAL at 06:09

## 2021-07-08 RX ADMIN — INSULIN LISPRO 4 UNITS: 100 INJECTION, SOLUTION INTRAVENOUS; SUBCUTANEOUS at 09:29

## 2021-07-08 RX ADMIN — AZITHROMYCIN MONOHYDRATE 500 MG: 250 TABLET ORAL at 20:27

## 2021-07-08 RX ADMIN — INSULIN LISPRO 4 UNITS: 100 INJECTION, SOLUTION INTRAVENOUS; SUBCUTANEOUS at 20:28

## 2021-07-08 RX ADMIN — POTASSIUM CHLORIDE 40 MEQ: 1500 TABLET, EXTENDED RELEASE ORAL at 16:45

## 2021-07-08 RX ADMIN — PROBIOTIC PRODUCT - TAB 1 TABLET: TAB at 09:29

## 2021-07-08 RX ADMIN — DILTIAZEM HYDROCHLORIDE 60 MG: 60 TABLET, FILM COATED ORAL at 17:42

## 2021-07-08 RX ADMIN — METOPROLOL TARTRATE 75 MG: 50 TABLET, FILM COATED ORAL at 09:29

## 2021-07-08 RX ADMIN — INSULIN LISPRO 8 UNITS: 100 INJECTION, SOLUTION INTRAVENOUS; SUBCUTANEOUS at 12:53

## 2021-07-08 RX ADMIN — MAGNESIUM SULFATE HEPTAHYDRATE 1000 MG: 1 INJECTION, SOLUTION INTRAVENOUS at 19:01

## 2021-07-08 ASSESSMENT — PAIN SCALES - GENERAL: PAINLEVEL_OUTOF10: 0

## 2021-07-08 NOTE — PROGRESS NOTES
[facility]    Gastroenterology Daily Progress Note      Patient:   Shaun Vazquez   :    1950   Facility:   [facility]  Date:     2021  Consultant:   GERONIMO Herndon CNP, CNP      SUBJECTIVE  66 y.o. female admitted 7/3/2021 with Pneumonia [J18.9] and seen for anemia with nausea and GE junction thickening per imaging. The pt was seen and examined. hgb 9.4 no nausea or abdominal pain denies dysphagia. Refusing colonoscopy but willing to do egd.          OBJECTIVE  Scheduled Meds:   allopurinol  100 mg Oral Daily    insulin glargine  30 Units Subcutaneous Nightly    pravastatin  10 mg Oral Nightly    lactobacillus  1 tablet Oral Daily    azithromycin  500 mg Oral Q24H    pantoprazole  40 mg Oral QAM AC    dilTIAZem  60 mg Oral 4 times per day    [Held by provider] apixaban  2.5 mg Oral BID    metoprolol tartrate  75 mg Oral BID    sodium chloride flush  5-40 mL Intravenous 2 times per day    cefTRIAXone (ROCEPHIN) IV  1,000 mg Intravenous Q24H    insulin lispro  0-12 Units Subcutaneous TID WC    insulin lispro  0-6 Units Subcutaneous Nightly       Vital Signs:  BP (!) 148/79   Pulse 79   Temp 98.4 °F (36.9 °C) (Oral)   Resp 16   Ht 5' (1.524 m)   Wt 229 lb (103.9 kg)   SpO2 94%   BMI 44.72 kg/m²      Physical Exam:   General Appearance: alert and oriented to person, place and time, well-developed and well-nourished, in no acute distress  Skin: warm and dry, no rash or erythema  Head: normocephalic and atraumatic  Eyes: pupils equal, round, and reactive to light, extraocular eye movements intact, conjunctivae normal  ENT: hearing grossly normal bilaterally  Neck: neck supple and non tender without mass, no thyromegaly or thyroid nodules, no cervical lymphadenopathy   Pulmonary/Chest: clear to auscultation bilaterally- no wheezes, rales or rhonchi, normal air movement, no respiratory distress  Cardiovascular: normal rate, regular rhythm, normal S1 and S2, no murmurs, rubs, clicks or gallops, distal pulses intact, no carotid bruits  Abdomen: soft, non-tender, obese  non-distended, normal bowel sounds, no masses or organomegaly  Extremities: no cyanosis, clubbing or edema  Musculoskeletal: normal range of motion, no joint swelling, deformity or tenderness  Neurologic: no cranial nerve deficit and muscle strength normal    Lab and Imaging Review     CBC  Recent Labs     07/06/21 0211 07/07/21  0543 07/08/21  0526   WBC 11.1 10.1 11.6*   HGB 9.9* 9.4* 9.4*   HCT 30.6* 29.0* 28.8*   MCV 91.1 92.7 92.9    288 314       BMP  Recent Labs     07/06/21  0211 07/07/21  0543 07/08/21  0526    141 140   K 3.8 3.6* 3.5*    103 105   CO2 23 25 22   BUN 67* 50* 41*   CREATININE 2.42* 1.71* 1.49*   GLUCOSE 156* 243* 227*   CALCIUM 8.7 8.2* 8.2*       ANEMIA STUDIES  Recent Labs     07/06/21  1431   LABIRON 30   TIBC 192*   FERRITIN 179*   [id number] >2000*   FOLATE 14.8     FINDINGS:ct abd 7/3/21   Lower Chest: Patchy consolidative changes seen in the left lower lobe.  Tiny   left-sided pleural effusion is seen       Organs:       Adrenal glands appear normal. No splenomegaly. No perisplenic fluid       There is a subtle lobular contour to the liver.  There are clips from prior   cholecystectomy       No peripancreatic fluid.  No peripancreatic inflammatory change       No stones or hydronephrosis on the right.  No stones or hydronephrosis on the   left       No peripancreatic fluid.  No peripancreatic inflammatory change.  Trace   left-sided pleural effusion is seen       GI/Bowel: No significant small bowel distention noted.  Mild stool load seen   in the colon.  Scattered colonic diverticula are seen.  Colon is incompletely   distended, accentuating its wall thickness.  No significant pericolonic   stranding       Pelvis: No free fluid in pelvis.  No pelvic adenopathy.  Bladder is   incompletely distended       Peritoneum/Retroperitoneum: Atherosclerotic change seen in aorta.  Tiny   retroperitoneal nodes are seen.       Bones/Soft Tissues: There is diastases of the rectus muscles.  Tiny   periumbilical hernia containing fat is seen           Impression   Left lower lobe pneumonia with small left-sided pleural effusion       Scattered areas of colonic wall thickening are seen, either due to the   partially contracted state of the colon or wall thickening from early colitis                 FINDINGS:ct chest 7/4/21   Mediastinum: Calcifications are seen in the thyroid.  A few hypodense nodules   are seen measuring 9 mm in size or less, for which no specific imaging   follow-up is recommended based on size       Small mediastinal and hilar nodes are noted.  Mild coronary artery   calcification is seen.  Aortic valve calcification is seen.  Trace   pericardial fluid is seen.  Small hiatal hernia seen. Milla Mimes is nonspecific   thickening at the GE junction       Lungs/pleura: Bandlike opacity seen in the right middle lobe.  Bandlike   opacity seen in the right lower lobe.  No focal consolidation is seen in the   right lung.  Mild traction bronchiectasis is seen in the right middle lobe.       Patchy consolidation is seen in the left lower lobe.  Small left-sided   pleural effusion is seen. Milla Mimes is adjacent left basilar opacity.       Upper Abdomen: Right adrenal gland is normal.  Left adrenal gland is normal.   Atherosclerotic change seen in abdominal aorta.  There is subtle lobular   contour to the liver.  Mild stool load is seen in the colon. .  A few   scattered colonic diverticula are seen.       Soft Tissues/Bones: Spurring is seen in the spine.  Spurring is seen in the   shoulder joints.           Impression   Left lower lobe pneumonia with small left-sided pleural effusion.  No focal   consolidation on the right.            ASSESSMENT/plan  1.  Anemia, ge junction thickening per imaging, pt refusing colonoscopy but willing to have egd done, clearance obtained  -hold the AC  -continue ppi and keep hgb >7      2.pneumonia-on abx per primary service          This plan was formulated in collaboration with Dr. Espinoza Delong. Electronically signed by: GERONIMO Pena CNP on 7/8/2021 at 12:28 PM     Attending Physician Statement  I have discussed the care of Adilia Zayas and   I have examined the patient myselft independently, and taken ros and hpi , including pertinent history and exam findings,  with the author of this note . I have reviewed the key elements of all parts of the encounter with the nurse practitioner/resident.     I agree with the assessment, plan and orders as documented by the above health care provider       Patient insisting on not doing the colonoscopy as an inpatient she will do it as an outpatient but she is agreeable for the EGD    Electronically signed by Jorge Streeter MD

## 2021-07-08 NOTE — PROGRESS NOTES
No covid test done prior to EGD d/t patient already received both vaccines. Copy of vaccine card placed in paper chart.

## 2021-07-08 NOTE — PROGRESS NOTES
Nephrology Progress Note    Subjective/   66y.o. year old female who we are seeing in consultation for acute kidney injury. Interval history:  Patient feels better. She has improving appetite. Plans for EGD tomorrow  Excellent urine output showing signs of renal recovery from ischemic ATN  Patient is hemodynamically stable with controlled heart rate. Scr 1.4 mg/dl  DARI negative, ANCA negative    History of Present Illness: This is a 66 y.o. female with past medical history chronic kidney disease 3 a with baseline creatinine of 1.2-1.4 mg/dL, type 2 diabetes, essential hypertension. Patient presented to the hospital with complaints of lethargy chills not feeling good poor oral intake and decrease in appetite for about 1 week nausea no vomiting. Patient did have few loose stools, patient also complained of cough and saw some streaks of blood twice  Patient also noted tachycardia,notedto be Afib. CT abdomen and pelvis showed left lower lobe pneumonia with small left-sided pleural effusion, scattered areas of colonic wall thickening. Pt denies any history of  prolonged NSAID use. Patient denies dysuria, gross hematuria, flank pain, nocturia, urgency, passing frothy urine or urinary incontinence. There has been no recent exposure to IV contrast.There is no history  of paraprotein disease. Pt denies any history of recurrent UTI or kidney stones. Medication review shows use of ARB's, hydrochlorothiazide, Metformin  Blood pressure stable no hypotension noted.         Objective/     Vitals:    07/08/21 0405 07/08/21 0843 07/08/21 1248 07/08/21 1548   BP: 139/80 (!) 148/79 115/69 (!) 140/81   Pulse: 87 79 83 93   Resp: 16 16 16 16   Temp: 98.2 °F (36.8 °C) 98.4 °F (36.9 °C) 98.4 °F (36.9 °C) 98.4 °F (36.9 °C)   TempSrc: Oral Oral Oral    SpO2: 95% 94% 95% 97%   Weight:       Height:         24HR INTAKE/OUTPUT:      Intake/Output Summary (Last 24 hours) at 7/8/2021 1549  Last data filed at 7/8/2021 7619  Gross per 24 hour   Intake 312.75 ml   Output 2500 ml   Net -2187.25 ml     No data found. Constitutional:  Alert, awake, no apparent distress  Cardiovascular:  S1, S2 without m/r/g  Respiratory: Crackles left lower lung base otherwise lungs are clear  Abdomen: +bs, soft, nt  Ext:  LE edema    Data/  Recent Labs     07/06/21  0211 07/07/21  0543 07/08/21  0526   WBC 11.1 10.1 11.6*   HGB 9.9* 9.4* 9.4*   HCT 30.6* 29.0* 28.8*   MCV 91.1 92.7 92.9    288 314     Recent Labs     07/06/21  0211 07/07/21  0543 07/08/21  0526    141 140   K 3.8 3.6* 3.5*    103 105   CO2 23 25 22   GLUCOSE 156* 243* 227*   MG 2.0 1.8 1.6   BUN 67* 50* 41*   CREATININE 2.42* 1.71* 1.49*   LABGLOM 20* 29* 34*   GFRAA 24* 36* 42*         Assessment/   1. Acute kidney injury, nonoliguric on CKD 3 most likely secondary to prerenal azotemia, from poor oral intake use of ARB and diuretics and hemodynamic related to A. fib with RVR-renal function is slowly improving-serum creatinine 1.49 mg/dL    2. Type 2 diabetes    3. Essential hypertension-controlled    4. CKD stage III with baseline creatinine of 1.2 to 1.4 mg/dL most likely secondary to diabetic nephropathy    5. Urinary tract infection with pyuria    6. CT finding of suspected lower lobe pneumonia-on IV antibiotics       7. Hypokalemia and Hypomagnesemia secondary to decreased intake and urinary loss  Plan/   Plans for EGD tomorrow-eliquis on hold  replace potassium and magnesium sliding scale  Strict I's and O's  Continue Abx per primary team.  Avoid nephrotoxic drugs. Discharge planning in progress. No objections to discharge tomorrow from renal standpoint.     Trenton Jimenez MD    Nephrologist

## 2021-07-08 NOTE — PROGRESS NOTES
Progress Note(Hospital)    [facility] PROGRESSIVE [facility]     Admition Status: Inpatient [101]     CC:Anorexia (No appetite for one week along with nausea ) and Fatigue (Pt reports generalized weakness for a couple of days )    HCC:  Patient currently doing pretty well today. They are planning for endoscopy by GI tomorrow. Patient is off of Eliquis but she will be started back on it once upper GI has been completed.           Current Facility-Administered Medications:     allopurinol (ZYLOPRIM) tablet 100 mg, 100 mg, Oral, Daily, Errol Hiro Narayan, DO, 100 mg at 07/08/21 0929    insulin glargine (LANTUS) injection vial 30 Units, 30 Units, Subcutaneous, Nightly, Errol Hedge Gracys, DO, 30 Units at 07/07/21 2100    pravastatin (PRAVACHOL) tablet 10 mg, 10 mg, Oral, Nightly, Errol Hedge Gracys, DO, 10 mg at 07/07/21 2100    lactobacillus (BACID) tablet 1 tablet, 1 tablet, Oral, Daily, Errol Narayan DO, 1 tablet at 07/08/21 0929    azithromycin (ZITHROMAX) tablet 500 mg, 500 mg, Oral, Q24H, Mt Narayan DO, 500 mg at 07/07/21 2100    pantoprazole (PROTONIX) tablet 40 mg, 40 mg, Oral, [facility], Jamar Pham, APRN - CNP, 40 mg at 07/08/21 0609    dilTIAZem (CARDIZEM) tablet 60 mg, 60 mg, Oral, 4 times per day, Laury Gaucher, MD, 60 mg at 07/08/21 1253    [Held by provider] apixaban (ELIQUIS) tablet 2.5 mg, 2.5 mg, Oral, BID, Fredy Zarate MD, 2.5 mg at 07/07/21 0857    metoprolol tartrate (LOPRESSOR) tablet 75 mg, 75 mg, Oral, BID, Fabio Costa DO, 75 mg at 07/08/21 0929    sodium chloride flush 0.9 % injection 5-40 mL, 5-40 mL, Intravenous, 2 times per day, Errol Hiro Narayan DO, 10 mL at 07/08/21 0932    sodium chloride flush 0.9 % injection 10 mL, 10 mL, Intravenous, PRN, Errol Hedge Neverauskas, DO    0.9 % sodium chloride infusion, 25 mL, Intravenous, PRN, Errol Hedge Neverauskas, DO    ondansetron (ZOFRAN-ODT) disintegrating tablet 4 mg, 4 mg, Oral, Q8H PRN **OR** ondansetron (ZOFRAN) injection 4 mg, 4 mg, Intravenous, Q6H PRN, Angelina Narayan, DO    polyethylene glycol (GLYCOLAX) packet 17 g, 17 g, Oral, Daily PRN, Angelina Narayan, DO    acetaminophen (TYLENOL) tablet 650 mg, 650 mg, Oral, Q6H PRN, 650 mg at 07/07/21 2255 **OR** acetaminophen (TYLENOL) suppository 650 mg, 650 mg, Rectal, Q6H PRN, Angelina Narayan, DO    cefTRIAXone (ROCEPHIN) 1000 mg IVPB in 50 mL D5W minibag, 1,000 mg, Intravenous, Q24H, Mt Narayan, DO, Stopped at 07/07/21 1758    insulin lispro (HUMALOG) injection vial 0-12 Units, 0-12 Units, Subcutaneous, TID WC, Angelina Narayan DO, 8 Units at 07/08/21 1253    insulin lispro (HUMALOG) injection vial 0-6 Units, 0-6 Units, Subcutaneous, Nightly, Angelina Narayan, DO, 4 Units at 07/07/21 2100    glucose (GLUTOSE) 40 % oral gel 15 g, 15 g, Oral, PRN, Angelina Narayan, DO    dextrose 50 % IV solution, 12.5 g, Intravenous, PRN, Angelina Narayan, DO    glucagon (rDNA) injection 1 mg, 1 mg, Intramuscular, PRN, Angelina Narayan, DO    dextrose 5 % solution, 100 mL/hr, Intravenous, PRN, Angelina Narayan, DO    metoprolol (LOPRESSOR) injection 5 mg, 5 mg, Intravenous, Q6H PRN, Fabio Costa DO    O:  /69   Pulse 83   Temp 98.4 °F (36.9 °C) (Oral)   Resp 16   Ht 5' (1.524 m)   Wt 229 lb (103.9 kg)   SpO2 95%   BMI 44.72 kg/m²     Intake/Output Summary (Last 24 hours) at 7/8/2021 1408  Last data filed at 7/8/2021 0418  Gross per 24 hour   Intake 312.75 ml   Output 2500 ml   Net -2187.25 ml       Physical Exam  Constitutional:       General: She is not in acute distress. Appearance: Normal appearance. She is well-developed. She is not ill-appearing. HENT:      Head: Normocephalic and atraumatic. Nose: Nose normal.      Mouth/Throat:      Mouth: Mucous membranes are moist.   Eyes:      Pupils: Pupils are equal, round, and reactive to light. Neck:      Vascular: No JVD. Cardiovascular:      Rate and Rhythm: Normal rate and regular rhythm. Heart sounds: No murmur heard. No friction rub. Pulmonary:      Effort: Pulmonary effort is normal. No respiratory distress. Breath sounds: Normal breath sounds. No stridor. Abdominal:      General: Bowel sounds are normal.      Palpations: Abdomen is soft. Genitourinary:     Comments: No Examined  Skin:     General: Skin is warm and dry. Neurological:      Mental Status: She is alert. Labs:      Lab Results   Component Value Date/Time    WBC 11.6 (H) 07/08/2021 05:26 AM    HGB 9.4 (L) 07/08/2021 05:26 AM    HCT 28.8 (L) 07/08/2021 05:26 AM     07/08/2021 05:26 AM    NEUTROABS 7.53 07/08/2021 05:26 AM     Lab Results   Component Value Date/Time     07/08/2021 05:26 AM    K 3.5 (L) 07/08/2021 05:26 AM     07/08/2021 05:26 AM    CREATININE 1.49 (H) 07/08/2021 05:26 AM    BUN 41 (H) 07/08/2021 05:26 AM    GLUCOSE 227 (H) 07/08/2021 05:26 AM     Lab Results   Component Value Date    PROBNP 13,599 07/03/2021    PROBNP 919 06/21/2019    PROBNP 1,105 06/20/2019     Lab Results   Component Value Date    CRP 27.6 07/08/2021    CRP 46.9 07/07/2021    CRP 85.3 07/06/2021     Lab Results   Component Value Date    INR 1.1 07/04/2021     No results for input(s): PROCAL in the last 72 hours.       Rad:    Echocardiogram 2D W M-Mode    Result Date: 7/6/2021  [facility] Transthoracic Echocardiography Report (TTE)  Patient Name Vandana Law Date of Study               07/06/2021               M   Date of      1950  Gender                      Female  Birth   Age          66 year(s)  Race                           Room Number  1004        Height:                     60 inch, 152.4 cm   Corporate ID [id number]    Weight:                     229 pounds, 103.9 kg  #   Patient Acct [de-identified]   BSA:          1.98 m^2      BMI:      44.72  # kg/m^2   MR #         [id number]     Sonographer                 [id number]   Accession #  [id number]  Interpreting Physician      Janice Maciel   Fellow                   Referring Nurse                           Practitioner   Interpreting             Referring Physician         Fabio Costa  Fellow  Type of Study   TTE procedure:2D Echocardiogram, M-Mode, Doppler, Color Doppler. Procedure Date Date: 07/06/2021 Start: 11:56 AM Study Location: [address] [place] [address] Technical Quality: Adequate visualization Indications:Atrial fibrillation. History / Tech. Comments: Procedure explained to patient. Patient Status: Inpatient Height: 60 inches Weight: 229.01 pounds BSA: 1.98 m^2 BMI: 44.72 kg/m^2 CONCLUSIONS Summary Left ventricle is normal in size Global left ventricular systolic function is normal Estimated ejection fraction is 65 % . Left atrium is moderately dilated. No significant valvular regurgitation or stenosis seen. No pericardial effusion seen. Normal aortic root dimension. Signature ----------------------------------------------------------------------------  Electronically signed by Maribel Mathew on 07/06/2021  02:33 PM ---------------------------------------------------------------------------- ----------------------------------------------------------------------------  Electronically signed by Eden Thomas(Interpreting physician) on 07/06/2021  10:52 PM ---------------------------------------------------------------------------- FINDINGS Left Atrium Left atrium is moderately dilated. Left Ventricle Left ventricle is normal in size Global left ventricular systolic function is normal Estimated ejection fraction is 65 % . Right Atrium Right atrium is normal in size. Right Ventricle Normal right ventricular size and function. Mitral Valve Mitral annular calcification is seen. Trivial mitral regurgitation.  Aortic Valve Normal aortic valve structure and function without stenosis or regurgitation. Tricuspid Valve Normal tricuspid valve structure and function. No tricuspid regurgitation was seen. Pulmonic Valve The pulmonic valve is normal in structure. Trivial pulmonic insufficiency. Pericardial Effusion No pericardial effusion seen. Miscellaneous Normal aortic root dimension. M-mode / 2D Measurements & Calculations:   LVIDd:5.4 cm(3.7 - 5.6 cm)       Diastolic KETDZX:568 ml  IYVDF:0.9 cm(2.2 - 4.0 cm)       Systolic LYDUMF:60.9 ml  YYXM:7.6 cm(0.6 - 1.1 cm)        Aortic Root:3 cm(2.0 - 3.7 cm)  LVPWd:1.1 cm(0.6 - 1.1 cm)       LA Dimension: 4.5 cm(1.9 - 4.0 cm)  Fractional Shortenin.74 %    LA volume/Index: 64.4 ml /33m^2  Calculated LVEF (%): 79.11 %   Mitral:                                    Aortic   Valve Area (P1/2-Time): 3.86 cm^2          Peak Velocity: 1.26 m/s  Peak E-Wave: 1.37 m/s                      Peak Gradient: 6.35 mmHg   Peak Gradient: 7.51 mmHg  Deceleration Time: 197 msec  P1/2t: 57 msec  Septal Wall E' velocity:0.06 m/s Lateral Wall E' velocity:0.09 m/s    CT ABDOMEN PELVIS WO CONTRAST Additional Contrast? None    Result Date: 7/3/2021  EXAMINATION: CT OF THE ABDOMEN AND PELVIS WITHOUT CONTRAST 7/3/2021 3:05 pm TECHNIQUE: CT of the abdomen and pelvis was performed without the administration of intravenous contrast. Multiplanar reformatted images are provided for review. Dose modulation, iterative reconstruction, and/or weight based adjustment of the mA/kV was utilized to reduce the radiation dose to as low as reasonably achievable. COMPARISON: None. HISTORY: ORDERING SYSTEM PROVIDED HISTORY: sepsis, unclear eitology persistent diarrhea/abd cramping TECHNOLOGIST PROVIDED HISTORY: sepsis, unclear eitology persistent diarrhea/abd cramping Decision Support Exception - unselect if not a suspected or confirmed emergency medical condition->Emergency Medical Condition (MA) Reason for Exam: Sepsis.  Acuity: Acute Type of Exam: Initial FINDINGS: Lower Chest: Patchy consolidative changes seen in the left lower lobe. Tiny left-sided pleural effusion is seen Organs: Adrenal glands appear normal. No splenomegaly. No perisplenic fluid There is a subtle lobular contour to the liver. There are clips from prior cholecystectomy No peripancreatic fluid. No peripancreatic inflammatory change No stones or hydronephrosis on the right. No stones or hydronephrosis on the left No peripancreatic fluid. No peripancreatic inflammatory change. Trace left-sided pleural effusion is seen GI/Bowel: No significant small bowel distention noted. Mild stool load seen in the colon. Scattered colonic diverticula are seen. Colon is incompletely distended, accentuating its wall thickness. No significant pericolonic stranding Pelvis: No free fluid in pelvis. No pelvic adenopathy. Bladder is incompletely distended Peritoneum/Retroperitoneum: Atherosclerotic change seen in aorta. Tiny retroperitoneal nodes are seen. Bones/Soft Tissues: There is diastases of the rectus muscles. Tiny periumbilical hernia containing fat is seen     Left lower lobe pneumonia with small left-sided pleural effusion Scattered areas of colonic wall thickening are seen, either due to the partially contracted state of the colon or wall thickening from early colitis     CT CHEST WO CONTRAST    Result Date: 7/4/2021  EXAMINATION: CT OF THE CHEST WITHOUT CONTRAST 7/4/2021 12:15 pm TECHNIQUE: CT of the chest was performed without the administration of intravenous contrast. Multiplanar reformatted images are provided for review. Dose modulation, iterative reconstruction, and/or weight based adjustment of the mA/kV was utilized to reduce the radiation dose to as low as reasonably achievable. COMPARISON: None. HISTORY: ORDERING SYSTEM PROVIDED HISTORY: Pneumonia TECHNOLOGIST PROVIDED HISTORY: NO IV CONTRAST Pneumonia Reason for Exam: Pneumonia seen on prior CT Scan.  Acuity: Acute Type of Exam: Initial FINDINGS: Mediastinum: Calcifications are seen in the thyroid. A few hypodense nodules are seen measuring 9 mm in size or less, for which no specific imaging follow-up is recommended based on size Small mediastinal and hilar nodes are noted. Mild coronary artery calcification is seen. Aortic valve calcification is seen. Trace pericardial fluid is seen. Small hiatal hernia seen. There is nonspecific thickening at the GE junction Lungs/pleura: Bandlike opacity seen in the right middle lobe. Bandlike opacity seen in the right lower lobe. No focal consolidation is seen in the right lung. Mild traction bronchiectasis is seen in the right middle lobe. Patchy consolidation is seen in the left lower lobe. Small left-sided pleural effusion is seen. There is adjacent left basilar opacity. Upper Abdomen: Right adrenal gland is normal.  Left adrenal gland is normal. Atherosclerotic change seen in abdominal aorta. There is subtle lobular contour to the liver. Mild stool load is seen in the colon. .  A few scattered colonic diverticula are seen. Soft Tissues/Bones: Spurring is seen in the spine. Spurring is seen in the shoulder joints. Left lower lobe pneumonia with small left-sided pleural effusion. No focal consolidation on the right. XR CHEST PORTABLE    Result Date: 7/3/2021  EXAMINATION: ONE XRAY VIEW OF THE CHEST 7/3/2021 2:26 pm COMPARISON: Chest radiograph performed 06/18/2019. HISTORY: ORDERING SYSTEM PROVIDED HISTORY: chest pain TECHNOLOGIST PROVIDED HISTORY: chest pain Reason for Exam: Pt states she's feeling ill x 6 days. Fatigue, chest pain. AP UPRIGHT PORTABLE Acuity: Acute Type of Exam: Subsequent/Follow-up FINDINGS: There is no acute consolidation or effusion. There is no pneumothorax. The mediastinal structures are unremarkable. The upper abdomen is unremarkable. The extrathoracic soft tissues are unremarkable. There is no acute osseous abnormality. No acute cardiopulmonary process.          A:  Active Hospital Problems    Diagnosis Date Noted    Abnormal CT scan, chest [R93.89]     Anemia, blood loss [D50.0]     Diarrhea [R19.7]     Nausea [R11.0]     Pneumonia [J18.9] 07/03/2021    Type 2 diabetes mellitus, without long-term current use of insulin (HCC) [E11.9] 06/17/2019    CKD (chronic kidney disease) stage 3, GFR 30-59 ml/min ([facility]) [N18.30] 03/21/2018           P:  1. Antibiotics are continuing. And will be discharged home with 5 more days of antibiotics. I think that coverage of the UTI will be my primary target this point in time. From a pulmonary standpoint she is doing quite well. 2.  Patient is following with her cardiologist plan will be for possible cardioversion. Will be done as an outpatient. Rufus Narayan DO  2:08 PM  7/8/2021    This note was created with the assistance of a speech-recognition program. Although the intention is to generate a document that actually reflects the content of the visit, no guarantees can be provided that every mistake has been identified and corrected by editing.

## 2021-07-08 NOTE — PLAN OF CARE
Problem: Falls - Risk of:  Goal: Will remain free from falls  Description: Will remain free from falls  7/8/2021 1024 by Cristobal Townsend RN  Outcome: Ongoing  Pt fall risk, fall band present, falling star, safety alarm activated and in use as needed. Hourly rounding performed. Pt encouraged to use call light. See [facility] fall risk assessment.

## 2021-07-08 NOTE — PROGRESS NOTES
[facility]   Progress Note                   Date:   7/8/2021  Patient name: Jono Carrillo  Date of admission:  7/3/2021  1:37 PM  MRN:   [id number]  YOB: 1950  PCP: Yuri Ware MD    Reason for Admission: Pneumonia and A.fib with RVR. Subjective:       Clinical Changes / Abnormalities: Seen & examined in room eating lunch. No acute CV issues/concerns overnight. Labs, vitals, tele, and testing reviewed. Remains Afib with rates 70-90's. GI work-up underway and plans for endoscopy tomorrow per patient      Medications:   Scheduled Meds:   allopurinol  100 mg Oral Daily    insulin glargine  30 Units Subcutaneous Nightly    pravastatin  10 mg Oral Nightly    lactobacillus  1 tablet Oral Daily    azithromycin  500 mg Oral Q24H    pantoprazole  40 mg Oral QAM AC    dilTIAZem  60 mg Oral 4 times per day    [Held by provider] apixaban  2.5 mg Oral BID    metoprolol tartrate  75 mg Oral BID    sodium chloride flush  5-40 mL Intravenous 2 times per day    cefTRIAXone (ROCEPHIN) IV  1,000 mg Intravenous Q24H    insulin lispro  0-12 Units Subcutaneous TID WC    insulin lispro  0-6 Units Subcutaneous Nightly     Continuous Infusions:   sodium chloride      dextrose       CBC:   Recent Labs     07/06/21  0211 07/07/21  0543 07/08/21  0526   WBC 11.1 10.1 11.6*   HGB 9.9* 9.4* 9.4*    288 314     BMP:    Recent Labs     07/06/21  0211 07/07/21  0543 07/08/21  0526    141 140   K 3.8 3.6* 3.5*    103 105   CO2 23 25 22   BUN 67* 50* 41*   CREATININE 2.42* 1.71* 1.49*   GLUCOSE 156* 243* 227*     INR:   No results for input(s): INR in the last 72 hours. Objective:   Vitals: BP (!) 148/79   Pulse 79   Temp 98.4 °F (36.9 °C) (Oral)   Resp 16   Ht 5' (1.524 m)   Wt 229 lb (103.9 kg)   SpO2 94%   BMI 44.72 kg/m²   General appearance: alert and cooperative with exam  HEENT: Head: Normocephalic, no lesions, without obvious abnormality.   Neck: No JVD, supple. Lungs: clear to auscultation bilaterally, slighty dim throughout, course LLL  Heart: Irregularly irregular rate and rhythm, S1, S2 normal, no murmur, click, rub or gallop  Abdomen: soft, non-tender; bowel sounds normal; no masses,  no organomegaly, obese  Extremities: extremities normal, atraumatic, no cyanosis or edema  Neurologic: Mental status: Alert, oriented, thought content appropriate    Patient Active Problem List:     HTN (hypertension)     Hypomagnesemia     CKD (chronic kidney disease) stage 3, GFR 30-59 ml/min ([facility])     Vitamin D deficiency     Reactive airway disease that is not asthma     Type 2 diabetes mellitus, without long-term current use of insulin ([facility])     Mixed hyperlipidemia     Nontoxic single thyroid nodule     Pneumonia     Abnormal CT scan, chest     Anemia     Diarrhea    Echocardiogram 7/7/2021:  Left ventricle is normal in size  Global left ventricular systolic function is normal  Estimated ejection fraction is 65 % . Left atrium is moderately dilated. No significant valvular regurgitation or stenosis seen. No pericardial effusion seen. Normal aortic root dimension. Assessment / Acute Cardiac Problems:     1. New onset A. fib with RVR currently on rate control and anticoagulation  2. Minimal troponin elevation consistent with type II MI due to BRANDON on CKD and pneumonia  3. Hypertension  4. Diabetes mellitus  5. Hyperlipidemia  6. Pneumonia  7. BRANDON on CKD  8. Preserved LV systolic function on echo as above with no significant valvular abnormalities    Plan of Treatment:     1. Continue rate control with CCB & BB and resume AC once GI testing completed. 2. We will need long-term anticoagulation with Eliquis on discharge  3. Will consider cardioversion as an outpatient after adequate anticoagulation. 4. Okay to proceed with endoscopy with low cardiac risk.       Electronically signed by GERONIMO Gannon CNP on 7/8/2021 at 11:41 [facility]  [phone]

## 2021-07-08 NOTE — CARE COORDINATION
Discharge planning    Patient chart reviewed. To have EGD today. New to eliquis 2.5 mg but now on hold. Pricing ran and was 47 dollars. Brought free month to [facility] on Tuesday for the 2.5 mg dosage. Patient lives alone has walk in shower seat cane an walker. Very independent and declines any home care services     Nephrology , cardiology and GI following    NEPHRO 7/7  Assessment/   1.  Acute kidney injury, nonoliguric on CKD 3 most likely secondary to prerenal azotemia, from poor oral intake use of ARB and diuretics and hemodynamic related to A. fib with RVR-renal function is slowly improving-serum creatinine 2.49 mg/dL      Plan/   Change IVF to 0.45% saline at 50 ml /hr  Kidney function improving, getting close to her baseline, can DC IVF tomorrow if not discharged today. Replace kcl per sliding scale    CARD 7/7  Plan of Treatment:      2. Continue rate control and anticoagulation  3. Continue beta-blockers and calcium channel blockers for rate control. 4. Currently on heparin drip for anticoagulation  5. We will need long-term anticoagulation with Eliquis on discharge  6. Will consider cardioversion as an outpatient after adequate anticoagulation. 7. Okay to proceed with endoscopy with low cardiac risk. May hold Eliquis as needed.

## 2021-07-08 NOTE — PLAN OF CARE
Problem: Falls - Risk of:  Goal: Will remain free from falls  Description: Will remain free from falls  Outcome: Ongoing  Note: Fall risk assessment completed. Patient instructed to use call light. Bed locked and in lowest position, side rails up 2/4, call light and bedside table within reach, clutter removed, and non-skid footwear on when pt out of bed. Hourly rounds will continue.       Problem: Falls - Risk of:  Goal: Absence of physical injury  Description: Absence of physical injury  Outcome: Ongoing     Problem: Pain:  Goal: Control of acute pain  Description: Control of acute pain  Outcome: Ongoing     Problem: Breathing Pattern - Ineffective:  Goal: Ability to achieve and maintain a regular respiratory rate will improve  Description: Ability to achieve and maintain a regular respiratory rate will improve  Outcome: Ongoing

## 2021-07-09 ENCOUNTER — ANESTHESIA (OUTPATIENT)
Dept: OPERATING ROOM | Age: 71
DRG: 193 | End: 2021-07-09
Payer: MEDICARE

## 2021-07-09 ENCOUNTER — ANESTHESIA EVENT (OUTPATIENT)
Dept: OPERATING ROOM | Age: 71
DRG: 193 | End: 2021-07-09
Payer: MEDICARE

## 2021-07-09 VITALS
OXYGEN SATURATION: 100 % | SYSTOLIC BLOOD PRESSURE: 122 MMHG | DIASTOLIC BLOOD PRESSURE: 59 MMHG | RESPIRATION RATE: 30 BRPM

## 2021-07-09 LAB
ABSOLUTE EOS #: 0.37 K/UL (ref 0–0.4)
ABSOLUTE IMMATURE GRANULOCYTE: 0.98 K/UL (ref 0–0.3)
ABSOLUTE LYMPH #: 2.09 K/UL (ref 1–4.8)
ABSOLUTE MONO #: 0.86 K/UL (ref 0.2–0.8)
ANION GAP SERPL CALCULATED.3IONS-SCNC: 13 MMOL/L (ref 9–17)
BASOPHILS # BLD: 1 %
BASOPHILS ABSOLUTE: 0.12 K/UL (ref 0–0.2)
BUN BLDV-MCNC: 35 MG/DL (ref 8–23)
BUN/CREAT BLD: 23 (ref 9–20)
C-REACTIVE PROTEIN: 18 MG/L (ref 0–5)
CALCIUM SERPL-MCNC: 8.8 MG/DL (ref 8.6–10.4)
CHLORIDE BLD-SCNC: 102 MMOL/L (ref 98–107)
CO2: 23 MMOL/L (ref 20–31)
CREAT SERPL-MCNC: 1.5 MG/DL (ref 0.5–0.9)
CULTURE: NORMAL
CULTURE: NORMAL
DIFFERENTIAL TYPE: ABNORMAL
EOSINOPHILS RELATIVE PERCENT: 3 % (ref 1–4)
GFR AFRICAN AMERICAN: 41 ML/MIN
GFR NON-AFRICAN AMERICAN: 34 ML/MIN
GFR SERPL CREATININE-BSD FRML MDRD: ABNORMAL ML/MIN/{1.73_M2}
GFR SERPL CREATININE-BSD FRML MDRD: ABNORMAL ML/MIN/{1.73_M2}
GLUCOSE BLD-MCNC: 203 MG/DL (ref 65–105)
GLUCOSE BLD-MCNC: 205 MG/DL (ref 65–105)
GLUCOSE BLD-MCNC: 246 MG/DL (ref 70–99)
GLUCOSE BLD-MCNC: 248 MG/DL (ref 65–105)
GLUCOSE BLD-MCNC: 284 MG/DL (ref 65–105)
HCT VFR BLD CALC: 30.9 % (ref 36.3–47.1)
HEMOGLOBIN: 9.8 G/DL (ref 11.9–15.1)
IMMATURE GRANULOCYTES: 8 %
LYMPHOCYTES # BLD: 17 % (ref 24–44)
Lab: NORMAL
Lab: NORMAL
MAGNESIUM: 2 MG/DL (ref 1.6–2.6)
MCH RBC QN AUTO: 29.7 PG (ref 25.2–33.5)
MCHC RBC AUTO-ENTMCNC: 31.7 G/DL (ref 28.4–34.8)
MCV RBC AUTO: 93.6 FL (ref 82.6–102.9)
MONOCYTES # BLD: 7 % (ref 1–7)
NRBC AUTOMATED: 0 PER 100 WBC
PDW BLD-RTO: 13.4 % (ref 11.8–14.4)
PLATELET # BLD: 323 K/UL (ref 138–453)
PLATELET ESTIMATE: ABNORMAL
PMV BLD AUTO: 10.4 FL (ref 8.1–13.5)
POTASSIUM SERPL-SCNC: 3.7 MMOL/L (ref 3.7–5.3)
PROCALCITONIN: 0.15 NG/ML
RBC # BLD: 3.3 M/UL (ref 3.95–5.11)
RBC # BLD: ABNORMAL 10*6/UL
SEG NEUTROPHILS: 64 % (ref 36–66)
SEGMENTED NEUTROPHILS ABSOLUTE COUNT: 7.88 K/UL (ref 1.8–7.7)
SODIUM BLD-SCNC: 138 MMOL/L (ref 135–144)
SPECIMEN DESCRIPTION: NORMAL
SPECIMEN DESCRIPTION: NORMAL
WBC # BLD: 12.3 K/UL (ref 3.5–11.3)
WBC # BLD: ABNORMAL 10*3/UL

## 2021-07-09 PROCEDURE — 0DB98ZX EXCISION OF DUODENUM, VIA NATURAL OR ARTIFICIAL OPENING ENDOSCOPIC, DIAGNOSTIC: ICD-10-PCS | Performed by: FAMILY MEDICINE

## 2021-07-09 PROCEDURE — 6370000000 HC RX 637 (ALT 250 FOR IP): Performed by: INTERNAL MEDICINE

## 2021-07-09 PROCEDURE — 7100000000 HC PACU RECOVERY - FIRST 15 MIN: Performed by: INTERNAL MEDICINE

## 2021-07-09 PROCEDURE — 6360000002 HC RX W HCPCS: Performed by: NURSE ANESTHETIST, CERTIFIED REGISTERED

## 2021-07-09 PROCEDURE — 36415 COLL VENOUS BLD VENIPUNCTURE: CPT

## 2021-07-09 PROCEDURE — 2060000000 HC ICU INTERMEDIATE R&B

## 2021-07-09 PROCEDURE — 43239 EGD BIOPSY SINGLE/MULTIPLE: CPT | Performed by: INTERNAL MEDICINE

## 2021-07-09 PROCEDURE — 2500000003 HC RX 250 WO HCPCS: Performed by: NURSE ANESTHETIST, CERTIFIED REGISTERED

## 2021-07-09 PROCEDURE — 6360000002 HC RX W HCPCS: Performed by: FAMILY MEDICINE

## 2021-07-09 PROCEDURE — 88305 TISSUE EXAM BY PATHOLOGIST: CPT

## 2021-07-09 PROCEDURE — 7100000001 HC PACU RECOVERY - ADDTL 15 MIN: Performed by: INTERNAL MEDICINE

## 2021-07-09 PROCEDURE — 85025 COMPLETE CBC W/AUTO DIFF WBC: CPT

## 2021-07-09 PROCEDURE — 0DB78ZX EXCISION OF STOMACH, PYLORUS, VIA NATURAL OR ARTIFICIAL OPENING ENDOSCOPIC, DIAGNOSTIC: ICD-10-PCS | Performed by: FAMILY MEDICINE

## 2021-07-09 PROCEDURE — 80048 BASIC METABOLIC PNL TOTAL CA: CPT

## 2021-07-09 PROCEDURE — 3700000000 HC ANESTHESIA ATTENDED CARE: Performed by: INTERNAL MEDICINE

## 2021-07-09 PROCEDURE — 88342 IMHCHEM/IMCYTCHM 1ST ANTB: CPT

## 2021-07-09 PROCEDURE — 2709999900 HC NON-CHARGEABLE SUPPLY: Performed by: INTERNAL MEDICINE

## 2021-07-09 PROCEDURE — 86140 C-REACTIVE PROTEIN: CPT

## 2021-07-09 PROCEDURE — 82947 ASSAY GLUCOSE BLOOD QUANT: CPT

## 2021-07-09 PROCEDURE — 6370000000 HC RX 637 (ALT 250 FOR IP): Performed by: NURSE PRACTITIONER

## 2021-07-09 PROCEDURE — 2580000003 HC RX 258: Performed by: NURSE ANESTHETIST, CERTIFIED REGISTERED

## 2021-07-09 PROCEDURE — 84145 PROCALCITONIN (PCT): CPT

## 2021-07-09 PROCEDURE — 3700000001 HC ADD 15 MINUTES (ANESTHESIA): Performed by: INTERNAL MEDICINE

## 2021-07-09 PROCEDURE — 3609012400 HC EGD TRANSORAL BIOPSY SINGLE/MULTIPLE: Performed by: INTERNAL MEDICINE

## 2021-07-09 PROCEDURE — 2580000003 HC RX 258: Performed by: FAMILY MEDICINE

## 2021-07-09 PROCEDURE — 83735 ASSAY OF MAGNESIUM: CPT

## 2021-07-09 RX ORDER — ONDANSETRON 2 MG/ML
4 INJECTION INTRAMUSCULAR; INTRAVENOUS
Status: DISCONTINUED | OUTPATIENT
Start: 2021-07-09 | End: 2021-07-09 | Stop reason: HOSPADM

## 2021-07-09 RX ORDER — LIDOCAINE HYDROCHLORIDE 20 MG/ML
INJECTION, SOLUTION EPIDURAL; INFILTRATION; INTRACAUDAL; PERINEURAL PRN
Status: DISCONTINUED | OUTPATIENT
Start: 2021-07-09 | End: 2021-07-09 | Stop reason: SDUPTHER

## 2021-07-09 RX ORDER — FENTANYL CITRATE 50 UG/ML
25 INJECTION, SOLUTION INTRAMUSCULAR; INTRAVENOUS EVERY 5 MIN PRN
Status: DISCONTINUED | OUTPATIENT
Start: 2021-07-09 | End: 2021-07-09 | Stop reason: HOSPADM

## 2021-07-09 RX ORDER — SODIUM CHLORIDE 9 MG/ML
INJECTION, SOLUTION INTRAVENOUS CONTINUOUS PRN
Status: DISCONTINUED | OUTPATIENT
Start: 2021-07-09 | End: 2021-07-09 | Stop reason: SDUPTHER

## 2021-07-09 RX ORDER — PANTOPRAZOLE SODIUM 40 MG/1
40 TABLET, DELAYED RELEASE ORAL
Qty: 30 TABLET | Refills: 3 | Status: CANCELLED | OUTPATIENT
Start: 2021-07-10

## 2021-07-09 RX ORDER — AZITHROMYCIN 250 MG/1
500 TABLET, FILM COATED ORAL EVERY 24 HOURS
Status: DISCONTINUED | OUTPATIENT
Start: 2021-07-09 | End: 2021-07-10 | Stop reason: HOSPADM

## 2021-07-09 RX ORDER — AZITHROMYCIN 500 MG/1
500 TABLET, FILM COATED ORAL EVERY 24 HOURS
Qty: 10 TABLET | Refills: 0 | Status: CANCELLED | OUTPATIENT
Start: 2021-07-09 | End: 2021-07-19

## 2021-07-09 RX ORDER — METOPROLOL TARTRATE 75 MG/1
75 TABLET, FILM COATED ORAL 2 TIMES DAILY
Qty: 60 TABLET | Refills: 3 | Status: CANCELLED | OUTPATIENT
Start: 2021-07-09

## 2021-07-09 RX ORDER — CEPHALEXIN 500 MG/1
500 CAPSULE ORAL 4 TIMES DAILY
Qty: 40 CAPSULE | Refills: 0 | Status: CANCELLED | OUTPATIENT
Start: 2021-07-09 | End: 2021-07-19

## 2021-07-09 RX ORDER — DILTIAZEM HYDROCHLORIDE 60 MG/1
60 TABLET, FILM COATED ORAL 4 TIMES DAILY
Qty: 120 TABLET | Refills: 3 | Status: CANCELLED | OUTPATIENT
Start: 2021-07-09 | End: 2021-08-08

## 2021-07-09 RX ORDER — PROPOFOL 10 MG/ML
INJECTION, EMULSION INTRAVENOUS PRN
Status: DISCONTINUED | OUTPATIENT
Start: 2021-07-09 | End: 2021-07-09 | Stop reason: SDUPTHER

## 2021-07-09 RX ADMIN — CEFTRIAXONE SODIUM 1000 MG: 1 INJECTION, POWDER, FOR SOLUTION INTRAMUSCULAR; INTRAVENOUS at 11:41

## 2021-07-09 RX ADMIN — PROPOFOL 50 MG: 10 INJECTION, EMULSION INTRAVENOUS at 15:47

## 2021-07-09 RX ADMIN — PROPOFOL 50 MG: 10 INJECTION, EMULSION INTRAVENOUS at 15:49

## 2021-07-09 RX ADMIN — METOPROLOL TARTRATE 75 MG: 50 TABLET, FILM COATED ORAL at 20:40

## 2021-07-09 RX ADMIN — LIDOCAINE HYDROCHLORIDE 100 MG: 20 INJECTION, SOLUTION EPIDURAL; INFILTRATION; INTRACAUDAL; PERINEURAL at 15:49

## 2021-07-09 RX ADMIN — DILTIAZEM HYDROCHLORIDE 60 MG: 60 TABLET, FILM COATED ORAL at 05:29

## 2021-07-09 RX ADMIN — PRAVASTATIN SODIUM 10 MG: 10 TABLET ORAL at 20:40

## 2021-07-09 RX ADMIN — SODIUM CHLORIDE, PRESERVATIVE FREE 10 ML: 5 INJECTION INTRAVENOUS at 08:40

## 2021-07-09 RX ADMIN — PANTOPRAZOLE SODIUM 40 MG: 40 TABLET, DELAYED RELEASE ORAL at 05:29

## 2021-07-09 RX ADMIN — INSULIN LISPRO 3 UNITS: 100 INJECTION, SOLUTION INTRAVENOUS; SUBCUTANEOUS at 20:41

## 2021-07-09 RX ADMIN — AZITHROMYCIN MONOHYDRATE 500 MG: 250 TABLET ORAL at 18:42

## 2021-07-09 RX ADMIN — INSULIN GLARGINE 30 UNITS: 100 INJECTION, SOLUTION SUBCUTANEOUS at 20:41

## 2021-07-09 RX ADMIN — SODIUM CHLORIDE: 9 INJECTION, SOLUTION INTRAVENOUS at 15:46

## 2021-07-09 RX ADMIN — DILTIAZEM HYDROCHLORIDE 60 MG: 60 TABLET, FILM COATED ORAL at 18:42

## 2021-07-09 ASSESSMENT — PULMONARY FUNCTION TESTS
PIF_VALUE: 1

## 2021-07-09 ASSESSMENT — PAIN - FUNCTIONAL ASSESSMENT
PAIN_FUNCTIONAL_ASSESSMENT: 0-10
PAIN_FUNCTIONAL_ASSESSMENT: 0-10

## 2021-07-09 ASSESSMENT — PAIN SCALES - GENERAL: PAINLEVEL_OUTOF10: 0

## 2021-07-09 NOTE — DISCHARGE INSTR - COC
Continuity of Care Form    Patient Name: Denzel Goodman   :  1950  MRN:  [id number]    [facility] date:  7/3/2021  Discharge date:  ***    Code Status Order: Full Code   Advance Directives:   Advance Care Flowsheet Documentation       Date/Time Healthcare Directive Type of Healthcare Directive Copy in [address] Agent's Name Healthcare Agent's Phone Number    [phone]  No, patient does not have an advance directive for healthcare treatment  --  --  --  --  --            Admitting Physician:  Rachel Henley DO  PCP: Zelalem Lopez MD    Discharging Nurse: [facility] Unit/Room#: 1004/1004-02  Discharging Unit Phone Number: ***    Emergency Contact:   Extended Emergency Contact Information  Primary Emergency Contact: Arley Singer [address] Phone: [phone]  Relation: Unknown  Secondary Emergency Contact: Migue Tyler, [address] [place] [address] Road Phone: [phone]  Relation: Other    Past Surgical History:  Past Surgical History:   Procedure Laterality Date    BUNIONECTOMY      CARPAL TUNNEL RELEASE Left 2018    CARPAL TUNNEL RELEASE Right 10/2018    CHOLECYSTECTOMY      FINGER TRIGGER RELEASE Right     ring finger    HERNIA REPAIR      HYSTERECTOMY      TONSILLECTOMY AND ADENOIDECTOMY         Immunization History:   Immunization History   Administered Date(s) Administered    Influenza, High Dose (Fluzone 65 yrs and older) 2018    Influenza, Intradermal, Preservative free 10/08/2019    Influenza, Triv, inactivated, subunit, adjuvanted, IM (Fluad 65 yrs and older) 10/08/2019    Pneumococcal Polysaccharide ([id number]) 10/08/2019       Active Problems:  Patient Active Problem List   Diagnosis Code    HTN (hypertension) I10    Hypomagnesemia E83.42    CKD (chronic kidney disease) stage 3, GFR 30-59 ml/min (HCC) N18.30    Vitamin D deficiency E55.9    Reactive airway disease that is not asthma J98.9    Type 2 diabetes mellitus, without RTQB:630753514:::1}    Routes of Feeding: {CHP DME Other Feedings:708776478:::0}  Liquids: {Slp liquid thickness:71464}  Daily Fluid Restriction: {CHP DME Yes amt example:931586795:::0}  Last Modified Barium Swallow with Video (Video Swallowing Test): {Done Not Done DACN:371906691:::8}    Treatments at the Time of Hospital Discharge:   Respiratory Treatments: ***  Oxygen Therapy:  {Therapy; copd oxygen:79299:::0}  Ventilator:    {[facility] Vent List:722728617:::0}    Rehab Therapies: {THERAPEUTIC INTERVENTION:0560113337}  Weight Bearing Status/Restrictions: {[facility] Weight Bearin:::0}  Other Medical Equipment (for information only, NOT a DME order):  {EQUIPMENT:129805947}  Other Treatments: ***    Patient's personal belongings (please select all that are sent with patient):  {CHP DME Belongings:089246902:::0}    RN SIGNATURE:  {Esignature:068210851:::0}    CASE MANAGEMENT/SOCIAL WORK SECTION    Inpatient Status Date: ***    Readmission Risk Assessment Score:  Readmission Risk              Risk of Unplanned Readmission:  17           Discharging to Facility/ Agency   Name:   Address:  Phone:  Fax:    Dialysis Facility (if applicable)   Name:  Address:  Dialysis Schedule:  Phone:  Fax:    / signature: {Esignature:719160153:::0}    PHYSICIAN SECTION    Prognosis: Good    Condition at Discharge: Stable    Rehab Potential (if transferring to Rehab): Good    Recommended Labs or Other Treatments After Discharge:     Physician Certification: I certify the above information and transfer of Naveed Nunez  is necessary for the continuing treatment of the diagnosis listed and that she requires Home Care for greater 30 days.      Update Admission H&P: No change in H&P    PHYSICIAN SIGNATURE:  Electronically signed by James Patino DO on 21 at 10:26 AM EDT

## 2021-07-09 NOTE — PROGRESS NOTES
Dr. Tato Woods rounded and updated on patient. Okay with resuming Eliquis and discharge tomorrow. See orders.

## 2021-07-09 NOTE — PLAN OF CARE
Problem: Breathing Pattern - Ineffective:  Goal: Ability to achieve and maintain a regular respiratory rate will improve  Description: Ability to achieve and maintain a regular respiratory rate will improve  Outcome: Ongoing   Patient denies any breathing difficulties. Oxygen WNL's on room air. Lung sounds clear. Will monitor.

## 2021-07-09 NOTE — PROGRESS NOTES
Nephrology Progress Note    Subjective/   66y.o. year old female who we are seeing in consultation for acute kidney injury. Interval history:  Patient feels better. She has improving appetite. Plans for EGD today  Excellent urine output showing signs of renal recovery from ischemic ATN  Patient is hemodynamically stable with controlled heart rate. Scr 1.5 mg/dl  DARI negative, ANCA negative    History of Present Illness: This is a 66 y.o. female with past medical history chronic kidney disease 3 a with baseline creatinine of 1.2-1.4 mg/dL, type 2 diabetes, essential hypertension. Patient presented to the hospital with complaints of lethargy chills not feeling good poor oral intake and decrease in appetite for about 1 week nausea no vomiting. Patient did have few loose stools, patient also complained of cough and saw some streaks of blood twice  Patient also noted tachycardia,notedto be Afib. CT abdomen and pelvis showed left lower lobe pneumonia with small left-sided pleural effusion, scattered areas of colonic wall thickening. Pt denies any history of  prolonged NSAID use. Patient denies dysuria, gross hematuria, flank pain, nocturia, urgency, passing frothy urine or urinary incontinence. There has been no recent exposure to IV contrast.There is no history  of paraprotein disease. Pt denies any history of recurrent UTI or kidney stones. Medication review shows use of ARB's, hydrochlorothiazide, Metformin  Blood pressure stable no hypotension noted. Objective/     Vitals:    07/08/21 1920 07/08/21 2353 07/09/21 0418 07/09/21 0759   BP: (!) 145/79 (!) 159/88 (!) 149/76 128/66   Pulse: 94 90 89 95   Resp: 20 18 16 16   Temp: 98.2 °F (36.8 °C) 97.3 °F (36.3 °C) 98.4 °F (36.9 °C) 98.2 °F (36.8 °C)   TempSrc: Oral Oral Oral    SpO2: 97% 97% 95% 97%   Weight:       Height:         24HR INTAKE/OUTPUT:    No intake or output data in the 24 hours ending 07/09/21 1129  No data found.     Constitutional: Alert, awake, no apparent distress  Cardiovascular:  S1, S2 without m/r/g  Respiratory: Crackles left lower lung base otherwise lungs are clear  Abdomen: +bs, soft, nt  Ext:  LE edema    Data/  Recent Labs     07/07/21  0543 07/08/21 0526 07/09/21  0404   WBC 10.1 11.6* 12.3*   HGB 9.4* 9.4* 9.8*   HCT 29.0* 28.8* 30.9*   MCV 92.7 92.9 93.6    314 323     Recent Labs     07/07/21  0543 07/08/21  0526 07/09/21  0404    140 138   K 3.6* 3.5* 3.7    105 102   CO2 25 22 23   GLUCOSE 243* 227* 246*   MG 1.8 1.6 2.0   BUN 50* 41* 35*   CREATININE 1.71* 1.49* 1.50*   LABGLOM 29* 34* 34*   GFRAA 36* 42* 41*         Assessment/   1. Acute kidney injury, nonoliguric on CKD 3 most likely secondary to prerenal azotemia, from poor oral intake use of ARB and diuretics and hemodynamic related to A. fib with RVR-renal function is slowly improving-serum creatinine 1.49 mg/dL    2. Type 2 diabetes    3. Essential hypertension-controlled    4. CKD stage III with baseline creatinine of 1.2 to 1.4 mg/dL most likely secondary to diabetic nephropathy    5. Urinary tract infection with pyuria    6. CT finding of suspected lower lobe pneumonia-on IV antibiotics       7. Hypokalemia and Hypomagnesemia secondary to decreased intake and urinary loss- improved. Plan/   Plans for EGD today-eliquis on hold    Strict I's and O's  Continue Abx per primary team.  Avoid nephrotoxic drugs. Discharge planning in progress. No objections to discharge from renal standpoint once cleared by other MDs.     Patricia Hodges MD    Nephrologist

## 2021-07-09 NOTE — PROGRESS NOTES
Progress Note(Hospital)    [facility] PROGRESSIVE CARE     Admition Status: Inpatient [101]     CC:Anorexia (No appetite for one week along with nausea ) and Fatigue (Pt reports generalized weakness for a couple of days )    HCC:  Destini doing pretty well has no real complaints no cough no shortness of breath no burning urination. We did go over her labs her white count has gone up and I am not too sure what the cause of it is. She does have a left lower lobe pneumonia she does have a UTI. Cultures have been negative.           Current Facility-Administered Medications:     azithromycin (ZITHROMAX) tablet 500 mg, 500 mg, Oral, Q24H, Mt Narayan DO    cefTRIAXone (ROCEPHIN) 1000 mg IVPB in 50 mL D5W minibag, 1,000 mg, Intravenous, Q24H, Mt Narayan DO    potassium chloride (KLOR-CON M) extended release tablet 40 mEq, 40 mEq, Oral, PRN, 40 mEq at 07/08/21 1645 **OR** potassium bicarb-citric acid (EFFER-K) effervescent tablet 40 mEq, 40 mEq, Oral, PRN **OR** potassium chloride 10 mEq/100 mL IVPB (Peripheral Line), 10 mEq, Intravenous, PRN, Dietrich Gowers Da Rocha-Afodu, MD    magnesium sulfate 1000 mg in dextrose 5% 100 mL IVPB, 1,000 mg, Intravenous, PRN, Kimber Rashid MD, Stopped at 07/08/21 2006    allopurinol (ZYLOPRIM) tablet 100 mg, 100 mg, Oral, Daily, Fatou Narayan, DO, 100 mg at 07/08/21 0929    insulin glargine (LANTUS) injection vial 30 Units, 30 Units, Subcutaneous, Nightly, Fatou Ari Stuartkas, DO, 30 Units at 07/08/21 2028    pravastatin (PRAVACHOL) tablet 10 mg, 10 mg, Oral, Nightly, Fatou Euless Narcisokas, DO, 10 mg at 07/08/21 2027    lactobacillus (BACID) tablet 1 tablet, 1 tablet, Oral, Daily, Fatoujackie Duboses, DO, 1 tablet at 07/08/21 0929    pantoprazole (PROTONIX) tablet 40 mg, 40 mg, Oral, QAM AC, Margaret Hirsch, APRN - CNP, 40 mg at 07/09/21 0529    dilTIAZem (CARDIZEM) tablet 60 mg, 60 mg, Oral, 4 times per day, Annmarie Guevara MD, 60 mg at 07/09/21 0529    [Held by provider] apixaban (ELIQUIS) tablet 2.5 mg, 2.5 mg, Oral, BID, Fredy Zarate MD, 2.5 mg at 07/07/21 0857    metoprolol tartrate (LOPRESSOR) tablet 75 mg, 75 mg, Oral, BID, Fabio Costa, DO, 75 mg at 07/08/21 2027    sodium chloride flush 0.9 % injection 5-40 mL, 5-40 mL, Intravenous, 2 times per day, Vero Narayan, DO, 10 mL at 07/09/21 0840    sodium chloride flush 0.9 % injection 10 mL, 10 mL, Intravenous, PRN, Vero Narayan DO    0.9 % sodium chloride infusion, 25 mL, Intravenous, PRN, Vero Narayan, DO    ondansetron (ZOFRAN-ODT) disintegrating tablet 4 mg, 4 mg, Oral, Q8H PRN **OR** ondansetron (ZOFRAN) injection 4 mg, 4 mg, Intravenous, Q6H PRN, Vero Narayan, DO    polyethylene glycol (GLYCOLAX) packet 17 g, 17 g, Oral, Daily PRN, Vero Narayan DO    acetaminophen (TYLENOL) tablet 650 mg, 650 mg, Oral, Q6H PRN, 650 mg at 07/07/21 2255 **OR** acetaminophen (TYLENOL) suppository 650 mg, 650 mg, Rectal, Q6H PRN, Vero Narayan, DO    insulin lispro (HUMALOG) injection vial 0-12 Units, 0-12 Units, Subcutaneous, TID WC, Mt Narayan DO, 6 Units at 07/08/21 1742    insulin lispro (HUMALOG) injection vial 0-6 Units, 0-6 Units, Subcutaneous, Nightly, Vero Narayan DO, 4 Units at 07/08/21 2028    glucose (GLUTOSE) 40 % oral gel 15 g, 15 g, Oral, PRN, Vero Narayan, DO    dextrose 50 % IV solution, 12.5 g, Intravenous, PRN, Vero Narayan DO    glucagon (rDNA) injection 1 mg, 1 mg, Intramuscular, PRN, Vero Narayan DO    dextrose 5 % solution, 100 mL/hr, Intravenous, PRN, Vero Narayan DO    metoprolol (LOPRESSOR) injection 5 mg, 5 mg, Intravenous, Q6H PRN, Fabio Costa DO    O:  /66   Pulse 95   Temp 98.2 °F (36.8 °C)   Resp 16   Ht 5' (1.524 m)   Wt 229 lb (103.9 kg)   SpO2 97%   BMI 44.72 kg/m²   No intake or output data in the 24 hours ending 07/09/21 1020    Physical Exam  Constitutional:       General: She is not in acute distress. Appearance: She is well-developed. She is obese. She is not ill-appearing. HENT:      Head: Normocephalic and atraumatic. Mouth/Throat:      Mouth: Mucous membranes are moist.   Eyes:      Extraocular Movements: Extraocular movements intact. Pupils: Pupils are equal, round, and reactive to light. Neck:      Vascular: No JVD. Cardiovascular:      Rate and Rhythm: Normal rate and regular rhythm. Heart sounds: No murmur heard. No friction rub. Pulmonary:      Effort: Pulmonary effort is normal. No respiratory distress. Breath sounds: Normal breath sounds. No stridor. Abdominal:      General: Bowel sounds are normal.      Palpations: Abdomen is soft. Genitourinary:     Comments: No Examined  Skin:     General: Skin is warm and dry. Neurological:      Mental Status: She is alert. Psychiatric:         Mood and Affect: Mood normal.         Behavior: Behavior normal.         Thought Content: Thought content normal.         Judgment: Judgment normal.         Labs:      Lab Results   Component Value Date/Time    WBC 12.3 (H) 07/09/2021 04:04 AM    HGB 9.8 (L) 07/09/2021 04:04 AM    HCT 30.9 (L) 07/09/2021 04:04 AM     07/09/2021 04:04 AM    NEUTROABS 7.88 (H) 07/09/2021 04:04 AM     Lab Results   Component Value Date/Time     07/09/2021 04:04 AM    K 3.7 07/09/2021 04:04 AM     07/09/2021 04:04 AM    CREATININE 1.50 (H) 07/09/2021 04:04 AM    BUN 35 (H) 07/09/2021 04:04 AM    GLUCOSE 246 (H) 07/09/2021 04:04 AM     Lab Results   Component Value Date    PROBNP 13,599 07/03/2021    PROBNP 919 06/21/2019    PROBNP 1,105 06/20/2019     Lab Results   Component Value Date    CRP 27.6 07/08/2021    CRP 46.9 07/07/2021    CRP 85.3 07/06/2021     Lab Results   Component Value Date    INR 1.1 07/04/2021     No results for input(s): PROCAL in the last 72 hours.       Rad:    Echocardiogram 2D W M-Mode    Result Date: 7/6/2021  [facility] Transthoracic Echocardiography Report (TTE)  Patient Name Ese Chu Date of Study               07/06/2021               M   Date of      1950  Gender                      Female  Birth   Age          66 year(s)  Race                           Room Number  1004        Height:                     60 inch, 152.4 cm   Corporate ID [id number]    Weight:                     229 pounds, 103.9 kg  #   Patient Carmelina [de-identified]   BSA:          1.98 m^2      BMI:      44.72  #                                                              kg/m^2   MR #         [id number]     Sonographer                 [email or web address]   Accession #  [id number]  Interpreting Physician      Luba Carroll   Fellow                   Referring Nurse                           Practitioner   Interpreting             Referring Physician         [address]  Fellow  Type of Study   TTE procedure:2D Echocardiogram, M-Mode, Doppler, Color Doppler. Procedure Date Date: 07/06/2021 Start: 11:56 AM Study Location: [address] [place] [address] Technical Quality: Adequate visualization Indications:Atrial fibrillation. History / Tech. Comments: Procedure explained to patient. Patient Status: Inpatient Height: 60 inches Weight: 229.01 pounds BSA: 1.98 m^2 BMI: 44.72 kg/m^2 CONCLUSIONS Summary Left ventricle is normal in size Global left ventricular systolic function is normal Estimated ejection fraction is 65 % . Left atrium is moderately dilated. No significant valvular regurgitation or stenosis seen. No pericardial effusion seen. Normal aortic root dimension.  Signature ----------------------------------------------------------------------------  Electronically signed by Pema Singh) on 07/06/2021  02:33 PM ---------------------------------------------------------------------------- ----------------------------------------------------------------------------  Electronically signed by Eden Thomas(Interpreting physician) on 2021  10:52 PM ---------------------------------------------------------------------------- FINDINGS Left Atrium Left atrium is moderately dilated. Left Ventricle Left ventricle is normal in size Global left ventricular systolic function is normal Estimated ejection fraction is 65 % . Right Atrium Right atrium is normal in size. Right Ventricle Normal right ventricular size and function. Mitral Valve Mitral annular calcification is seen. Trivial mitral regurgitation. Aortic Valve Normal aortic valve structure and function without stenosis or regurgitation. Tricuspid Valve Normal tricuspid valve structure and function. No tricuspid regurgitation was seen. Pulmonic Valve The pulmonic valve is normal in structure. Trivial pulmonic insufficiency. Pericardial Effusion No pericardial effusion seen. Miscellaneous Normal aortic root dimension. M-mode / 2D Measurements & Calculations:   LVIDd:5.4 cm(3.7 - 5.6 cm)       Diastolic SOPZNT:850 ml  XOLNJ:6.9 cm(2.2 - 4.0 cm)       Systolic HMCFWF:90.8 ml  YACB:9.4 cm(0.6 - 1.1 cm)        Aortic Root:3 cm(2.0 - 3.7 cm)  LVPWd:1.1 cm(0.6 - 1.1 cm)       LA Dimension: 4.5 cm(1.9 - 4.0 cm)  Fractional Shortenin.74 %    LA volume/Index: 64.4 ml /33m^2  Calculated LVEF (%): 79.11 %   Mitral:                                    Aortic   Valve Area (P1/2-Time): 3.86 cm^2          Peak Velocity: 1.26 m/s  Peak E-Wave: 1.37 m/s                      Peak Gradient: 6.35 mmHg   Peak Gradient: 7.51 mmHg  Deceleration Time: 197 msec  P1/2t: 57 msec  Septal Wall E' velocity:0.06 m/s Lateral Wall E' velocity:0.09 m/s    CT ABDOMEN PELVIS WO CONTRAST Additional Contrast? None    Result Date: 7/3/2021  EXAMINATION: CT OF THE ABDOMEN AND PELVIS WITHOUT CONTRAST 7/3/2021 3:05 pm TECHNIQUE: CT of the abdomen and pelvis was performed without the administration of intravenous contrast. Multiplanar reformatted images are provided for review.  Dose modulation, iterative reconstruction, and/or weight based adjustment of the mA/kV was utilized to reduce the radiation dose to as low as reasonably achievable. COMPARISON: None. HISTORY: ORDERING SYSTEM PROVIDED HISTORY: sepsis, unclear eitology persistent diarrhea/abd cramping TECHNOLOGIST PROVIDED HISTORY: sepsis, unclear eitology persistent diarrhea/abd cramping Decision Support Exception - unselect if not a suspected or confirmed emergency medical condition->Emergency Medical Condition (MA) Reason for Exam: Sepsis. Acuity: Acute Type of Exam: Initial FINDINGS: Lower Chest: Patchy consolidative changes seen in the left lower lobe. Tiny left-sided pleural effusion is seen Organs: Adrenal glands appear normal. No splenomegaly. No perisplenic fluid There is a subtle lobular contour to the liver. There are clips from prior cholecystectomy No peripancreatic fluid. No peripancreatic inflammatory change No stones or hydronephrosis on the right. No stones or hydronephrosis on the left No peripancreatic fluid. No peripancreatic inflammatory change. Trace left-sided pleural effusion is seen GI/Bowel: No significant small bowel distention noted. Mild stool load seen in the colon. Scattered colonic diverticula are seen. Colon is incompletely distended, accentuating its wall thickness. No significant pericolonic stranding Pelvis: No free fluid in pelvis. No pelvic adenopathy. Bladder is incompletely distended Peritoneum/Retroperitoneum: Atherosclerotic change seen in aorta. Tiny retroperitoneal nodes are seen. Bones/Soft Tissues: There is diastases of the rectus muscles.   Tiny periumbilical hernia containing fat is seen     Left lower lobe pneumonia with small left-sided pleural effusion Scattered areas of colonic wall thickening are seen, either due to the partially contracted state of the colon or wall thickening from early colitis     CT CHEST WO CONTRAST    Result Date: 7/4/2021  EXAMINATION: CT OF THE CHEST WITHOUT CONTRAST 7/4/2021 12:15 pm TECHNIQUE: CT of the chest was performed without the administration of intravenous contrast. Multiplanar reformatted images are provided for review. Dose modulation, iterative reconstruction, and/or weight based adjustment of the mA/kV was utilized to reduce the radiation dose to as low as reasonably achievable. COMPARISON: None. HISTORY: ORDERING SYSTEM PROVIDED HISTORY: Pneumonia TECHNOLOGIST PROVIDED HISTORY: NO IV CONTRAST Pneumonia Reason for Exam: Pneumonia seen on prior CT Scan. Acuity: Acute Type of Exam: Initial FINDINGS: Mediastinum: Calcifications are seen in the thyroid. A few hypodense nodules are seen measuring 9 mm in size or less, for which no specific imaging follow-up is recommended based on size Small mediastinal and hilar nodes are noted. Mild coronary artery calcification is seen. Aortic valve calcification is seen. Trace pericardial fluid is seen. Small hiatal hernia seen. There is nonspecific thickening at the GE junction Lungs/pleura: Bandlike opacity seen in the right middle lobe. Bandlike opacity seen in the right lower lobe. No focal consolidation is seen in the right lung. Mild traction bronchiectasis is seen in the right middle lobe. Patchy consolidation is seen in the left lower lobe. Small left-sided pleural effusion is seen. There is adjacent left basilar opacity. Upper Abdomen: Right adrenal gland is normal.  Left adrenal gland is normal. Atherosclerotic change seen in abdominal aorta. There is subtle lobular contour to the liver. Mild stool load is seen in the colon. .  A few scattered colonic diverticula are seen. Soft Tissues/Bones: Spurring is seen in the spine. Spurring is seen in the shoulder joints. Left lower lobe pneumonia with small left-sided pleural effusion. No focal consolidation on the right.      XR CHEST PORTABLE    Result Date: 7/3/2021  EXAMINATION: ONE XRAY VIEW OF THE CHEST 7/3/2021 2:26 pm COMPARISON: Chest radiograph performed 06/18/2019. HISTORY: ORDERING SYSTEM PROVIDED HISTORY: chest pain TECHNOLOGIST PROVIDED HISTORY: chest pain Reason for Exam: Pt states she's feeling ill x 6 days. Fatigue, chest pain. AP UPRIGHT PORTABLE Acuity: Acute Type of Exam: Subsequent/Follow-up FINDINGS: There is no acute consolidation or effusion. There is no pneumothorax. The mediastinal structures are unremarkable. The upper abdomen is unremarkable. The extrathoracic soft tissues are unremarkable. There is no acute osseous abnormality. No acute cardiopulmonary process. A:  Active Hospital Problems    Diagnosis Date Noted    Abnormal CT scan, chest [R93.89]     Anemia, blood loss [D50.0]     Diarrhea [R19.7]     Nausea [R11.0]     Pneumonia [J18.9] 07/03/2021    Type 2 diabetes mellitus, without long-term current use of insulin ([facility]) [E11.9] 06/17/2019    CKD (chronic kidney disease) stage 3, GFR 30-59 ml/min ([facility]) [N18.30] 03/21/2018           P:  1. This patient's pneumonia we will continue with the treatment of Rocephin and Zithromax. My biggest concern is is that her white count has gone up. Recheck a procalcitonin level to make sure that that has come down to a reasonable level. As far as her UTI goes I am going to go ahead and obviously continue IV antibiotics. Could possibly conceivably go home today just depending on what my procalcitonin levels can show. Had to return pneumonia I would say that this is more bacterial than it is viral because of the elevated procalcitonin level. 2.  Await EGD results. Rufus Narayan DO  10:20 AM  7/9/2021    This note was created with the assistance of a speech-recognition program. Although the intention is to generate a document that actually reflects the content of the visit, no guarantees can be provided that every mistake has been identified and corrected by editing.

## 2021-07-09 NOTE — ANESTHESIA POSTPROCEDURE EVALUATION
Department of Anesthesiology  Postprocedure Note    Patient: Rashel Erickson  MRN: [id number]  YOB: 1950  Date of evaluation: 7/9/2021  Time:  4:15 PM     Procedure Summary     Date: 07/09/21 Room / Location: [place] [address] / [facility] - INPATIENT    Anesthesia Start: 5776 Anesthesia Stop: 5696    Procedure: EGD BIOPSY (N/A Mouth) Diagnosis: (ANEMIA)    Surgeons: Miguel Reese MD Responsible Provider: Chula Cantu MD    Anesthesia Type: TIVA ASA Status: 4          Anesthesia Type: TIVA    Valorie Phase I: Valorie Score: 10    Valorie Phase II:      Last vitals: Reviewed and per EMR flowsheets.        Anesthesia Post Evaluation    Patient location during evaluation: PACU  Patient participation: complete - patient participated  Level of consciousness: awake  Airway patency: patent  Nausea & Vomiting: no nausea  Complications: no  Cardiovascular status: blood pressure returned to baseline  Respiratory status: acceptable  Hydration status: euvolemic

## 2021-07-09 NOTE — ANESTHESIA PRE PROCEDURE
Department of Anesthesiology  Preprocedure Note       Name:  Jose Mccoy   Age:  66 y.o.  :  1950                                          MRN:  [id number]         Date:  2021      Surgeon: Toña Acosta):  Myla Nuno MD    Procedure: Procedure(s):  EGD DIAGNOSTIC ONLY    Medications prior to admission:   Prior to Admission medications    Medication Sig Start Date End Date Taking?  Authorizing Provider   hydroCHLOROthiazide (HYDRODIURIL) 50 MG tablet Take 50 mg by mouth daily   Yes Historical Provider, MD   metoprolol tartrate (LOPRESSOR) 50 MG tablet Take 50 mg by mouth 2 times daily   Yes Historical Provider, MD   valsartan (DIOVAN) 320 MG tablet TAKE 1 TABLET BY MOUTH EVERY DAY 21  Yes Jenine Gowers, MD   allopurinol (ZYLOPRIM) 100 MG tablet TAKE 1 TABLET BY MOUTH EVERY DAY 20  Yes Historical Provider, MD   Multiple Vitamins-Minerals (THERAPEUTIC MULTIVITAMIN-MINERALS) tablet Take 1 tablet by mouth daily   Yes Historical Provider, MD   Biotin (BIOTIN 5000) 5 MG CAPS Take 1 capsule by mouth daily    Yes Historical Provider, MD   Insulin Glargine (BASAGLAR KWIKPEN SC) Inject 30 Units into the skin nightly   Yes Historical Provider, MD   metFORMIN (GLUCOPHAGE-XR) 500 MG extended release tablet TAKE 2 TABLETS BY MOUTH TWICE A DAY 19  Yes Historical Provider, MD   amLODIPine (NORVASC) 10 MG tablet Take 10 mg by mouth daily    Yes Historical Provider, MD   tiZANidine (ZANAFLEX) 4 MG tablet TAKE 1 TABLET BY MOUTH, AT BEDTIME DAILY 19  Yes Historical Provider, MD   Probiotic Product (ACIDOPHILUS PROBIOTIC) CAPS capsule Take 1 capsule by mouth daily   Yes Historical Provider, MD   aspirin 81 MG EC tablet Take 1 tablet by mouth daily 19  Yes Mt Narayan, DO   glimepiride (AMARYL) 4 MG tablet Take 1 tablet by mouth 2 times daily (with meals)  Patient taking differently: Take 8 mg by mouth every morning  19  Yes Mt Narayan, DO   pravastatin (PRAVACHOL) 10 MG tablet Take 1 tablet by mouth nightly 6/21/19  Yes Mt Narayan DO   calcium carbonate (OSCAL) 500 MG TABS tablet Take 1,200 mg by mouth daily Take 2 tabs daily    Yes Historical Provider, MD   NONFORMULARY Circulation and vein support    Historical Provider, MD   Blood Glucose Monitoring Suppl (ONE TOUCH ULTRA 2) W/DEVICE KIT USE BID UTD 7/21/16   Historical Provider, MD       Current medications:    Current Facility-Administered Medications   Medication Dose Route Frequency Provider Last Rate Last Admin    azithromycin (ZITHROMAX) tablet 500 mg  500 mg Oral Q24H Mt Narayan DO        cefTRIAXone (ROCEPHIN) 1000 mg IVPB in 50 mL D5W minibag  1,000 mg Intravenous Q24H Mt Narayan DO   Stopped at 07/09/21 1322    potassium chloride (KLOR-CON M) extended release tablet 40 mEq  40 mEq Oral PRN Angie Marie MD   40 mEq at 07/08/21 1645    Or    potassium bicarb-citric acid (EFFER-K) effervescent tablet 40 mEq  40 mEq Oral PRN Angie Marie MD        Or   Stevo Calloway potassium chloride 10 mEq/100 mL IVPB (Peripheral Line)  10 mEq Intravenous PRN Angie Marie MD        magnesium sulfate 1000 mg in dextrose 5% 100 mL IVPB  1,000 mg Intravenous PRN Vishal Spence MD   Stopped at 07/08/21 2006    allopurinol (ZYLOPRIM) tablet 100 mg  100 mg Oral Daily Mt Narayan DO   100 mg at 07/08/21 0929    insulin glargine (LANTUS) injection vial 30 Units  30 Units Subcutaneous Nightly Earnest Gordon Narayan DO   30 Units at 07/08/21 2028    pravastatin (PRAVACHOL) tablet 10 mg  10 mg Oral Nightly Mt Narayan DO   10 mg at 07/08/21 2027    lactobacillus (BACID) tablet 1 tablet  1 tablet Oral Daily Earnest Gordon Narayan DO   1 tablet at 07/08/21 0929    pantoprazole (PROTONIX) tablet 40 mg  40 mg Oral QAM AC GERONIMO Mckenna CNP   40 mg at 07/09/21 0529    dilTIAZem (CARDIZEM) tablet 60 mg  60 mg Oral 4 times per day Charu Polo MD Problem List:    Patient Active Problem List   Diagnosis Code    HTN (hypertension) I10    Hypomagnesemia E83.42    CKD (chronic kidney disease) stage 3, GFR 30-59 ml/min ([facility]) N18.30    Vitamin D deficiency E55.9    Reactive airway disease that is not asthma J98.9    Type 2 diabetes mellitus, without long-term current use of insulin ([facility]) E11.9    Mixed hyperlipidemia E78.2    Nontoxic single thyroid nodule E04.1    Pneumonia J18.9    Abnormal CT scan, chest R93.89    Anemia, blood loss D50.0    Diarrhea R19.7    Nausea R11.0       Past Medical History:        Diagnosis Date    Anemia     Arrhythmia     Arthritis     Asthma     CKD (chronic kidney disease) stage 1, GFR 90 ml/min or greater     DM type 2 (diabetes mellitus, type 2) ([facility])     HTN (hypertension)     Hypomagnesemia        Past Surgical History:        Procedure Laterality Date    BUNIONECTOMY      CARPAL TUNNEL RELEASE Left 03/2018    CARPAL TUNNEL RELEASE Right 10/2018    CHOLECYSTECTOMY      FINGER TRIGGER RELEASE Right     ring finger    HERNIA REPAIR      HYSTERECTOMY      TONSILLECTOMY AND ADENOIDECTOMY         Social History:    Social History     Tobacco Use    Smoking status: Never Smoker    Smokeless tobacco: Never Used   Substance Use Topics    Alcohol use:  No                                Counseling given: Not Answered      Vital Signs (Current):   Vitals:    07/08/21 2353 07/09/21 0418 07/09/21 0759 07/09/21 1201   BP: (!) 159/88 (!) 149/76 128/66 118/70   Pulse: 90 89 95 99   Resp: 18 16 16 16   Temp: 97.3 °F (36.3 °C) 98.4 °F (36.9 °C) 98.2 °F (36.8 °C) 98.4 °F (36.9 °C)   TempSrc: Oral Oral     SpO2: 97% 95% 97% 98%   Weight:       Height:                                                  BP Readings from Last 3 Encounters:   07/09/21 118/70   06/17/21 (!) 128/58   02/04/21 (!) 142/78       NPO Status:                                                                                 BMI:   Wt Readings from Last 3 Encounters:   07/03/21 229 lb (103.9 kg)   06/17/21 229 lb 3.2 oz (104 kg)   02/04/21 236 lb 9.6 oz (107.3 kg)     Body mass index is 44.72 kg/m². CBC:   Lab Results   Component Value Date    WBC 12.3 07/09/2021    RBC 3.30 07/09/2021    HGB 9.8 07/09/2021    HCT 30.9 07/09/2021    MCV 93.6 07/09/2021    RDW 13.4 07/09/2021     07/09/2021       CMP:   Lab Results   Component Value Date     07/09/2021    K 3.7 07/09/2021     07/09/2021    CO2 23 07/09/2021    BUN 35 07/09/2021    CREATININE 1.50 07/09/2021    GFRAA 41 07/09/2021    LABGLOM 34 07/09/2021    GLUCOSE 246 07/09/2021    PROT 5.9 07/04/2021    CALCIUM 8.8 07/09/2021    BILITOT 0.38 07/03/2021    ALKPHOS 78 07/03/2021    AST 37 07/03/2021    ALT 30 07/03/2021       POC Tests:   Recent Labs     07/09/21  1117   POCGLU 248*       Coags:   Lab Results   Component Value Date    PROTIME 14.4 07/04/2021    INR 1.1 07/04/2021    APTT 31.0 07/03/2021       HCG (If Applicable): No results found for: PREGTESTUR, PREGSERUM, HCG, HCGQUANT     ABGs: No results found for: PHART, PO2ART, TGV4MYC, GMN9GDH, BEART, U9IXGUYW     Type & Screen (If Applicable):  No results found for: LABABO, LABRH    Drug/Infectious Status (If Applicable):  No results found for: HIV, HEPCAB    COVID-19 Screening (If Applicable): No results found for: COVID19        Anesthesia Evaluation   no history of anesthetic complications:   Airway: Mallampati: II  TM distance: >3 FB     Mouth opening: > = 3 FB Dental:          Pulmonary:normal exam    (+) asthma:                            Cardiovascular:    (+) hypertension:, past MI (type 2):, dysrhythmias: atrial fibrillation, hyperlipidemia    (-)  angina      Rhythm: irregular  Rate: normal                 ROS comment: 7/21 CONCLUSIONS     Summary  Left ventricle is normal in size  Global left ventricular systolic function is normal  Estimated ejection fraction is 65 % . Left atrium is moderately dilated.   No significant valvular regurgitation or stenosis seen. No pericardial effusion seen. Normal aortic root dimension. Neuro/Psych:               GI/Hepatic/Renal:   (+) renal disease: CRI,           Endo/Other:    (+) Diabetes, . Abdominal:             Vascular: Other Findings:             Anesthesia Plan      TIVA     ASA 4     (Cardiac cleared    Assessment / Acute Cardiac Problems:     1. New onset A. fib with RVR currently on rate control and anticoagulation  2. Minimal troponin elevation consistent with type II MI due to BRANDON on CKD and pneumonia  3. Hypertension  4. Diabetes mellitus  5. Hyperlipidemia  6. Pneumonia  7. BRANDON on CKD  8. Preserved LV systolic function on echo as above with no significant valvular abnormalities     Plan of Treatment:     1. Continue rate control with CCB & BB and resume AC once GI testing completed. 2. We will need long-term anticoagulation with Eliquis on discharge  3. Will consider cardioversion as an outpatient after adequate anticoagulation. 4. Okay to proceed with endoscopy with low cardiac risk.    )  Induction: intravenous. MIPS: Prophylactic antiemetics administered. Anesthetic plan and risks discussed with patient. Plan discussed with CRNA.     Attending anesthesiologist reviewed and agrees with Laura Franklin MD   7/9/2021

## 2021-07-09 NOTE — OP NOTE
Operative Note  PROCEDURE NOTE    DATE OF PROCEDURE: 7/9/2021     SURGEON: Sol Tellez MD  Facility: [facility]  ASSISTANT: None  Anesthesia: mac   PREOPERATIVE DIAGNOSIS:     Abnormal CT scan of the abdomen with thickening of the GE junction  A previous CAT scan which showed thickening of the colon on CAT scan of the abdomen on the third   some diarrhea  Significant anemia  Hemoccult positive stool    Diagnosis:  Severe gastritis with erosion in the antrum biopsies were taken to rule out H. Pylori    Edema and erythema of the duodenal especially in the bulb consistent with duodenitis biopsies were taken    No abnormality of the GE junction to concur with the CT findings    POSTOPERATIVE DIAGNOSIS: As described below    OPERATION: Upper GI endoscopy with Biopsy    ANESTHESIA: Moderate Sedation     ESTIMATED BLOOD LOSS: Less than 50 ml    COMPLICATIONS: None. SPECIMENS:  Was Obtained:     Above     HISTORY: The patient is a 66y.o. year old female with history of above preop diagnosis. I recommended esophagogastroduodenoscopy with possible biopsy and I explained the risk, benefits, expected outcome, and alternatives to the procedure. Risks included but are not limited to bleeding, infection, respiratory distress, hypotension, and perforation of the esophagus, stomach, or duodenum. Patient understands and is in agreement. The patient was counseled at length about the risks of gretta Covid-19 during their perioperative period and any recovery window from their procedure. The patient was made aware that gretta Covid-19  may worsen their prognosis for recovering from their procedure  and lend to a higher morbidity and/or mortality risk. All material risks, benefits, and reasonable alternatives including postponing the procedure were discussed. The patient does wish to proceed with the procedure at this time. PROCEDURE: The patient was given IV conscious sedation.   The patient's SPO2 remained above 90% throughout the procedure. The gastroscope was inserted orally and advanced under direct vision through the esophagus, through the stomach, through the pylorus, and into the descending duodenum. Post sedation note : The patient's SPO2 remained above 90% throughout the procedure. the vital signs remained stable , and no immediate complication form the procedure noted, patient will be ready for d/c when criteria is met . Findings:    Retropharyngeal area was grossly normal appearing  Severe gastritis with erosion in the antrum biopsies were taken to rule out H. Pylori    Edema and erythema of the duodenal especially in the bulb consistent with duodenitis biopsies were taken    No abnormality of the GE junction to concur with the CT findings    The scope was removed and the patient tolerated the procedure well. Recommendations/Plan:   1. F/U Biopsies  2. PPI  3. Treat H. pylori if positive  4. Okay to discharge and follow as an outpatient  5.  We will try to talk to the patient as an outpatient see if she would be agreed to do colonoscopy    Electronically signed by Octaviano Eastman MD  on 7/9/2021 at 3:56 PM

## 2021-07-09 NOTE — PROGRESS NOTES
[facility]   Progress Note                   Date:   7/9/2021  Patient name: Heather Scott  Date of admission:  7/3/2021  1:37 PM  MRN:   [id number]  YOB: 1950  PCP: Og Sanchez MD    Reason for Admission: Pneumonia and A.fib with RVR. Subjective:       Clinical Changes / Abnormalities: Patient seen and examined sitting up in chair in room with RN present. Denies chest pain or SOB. S/p EGD this afternoon. AFib on tele  HR low 100s cl683d. Medications:   Scheduled Meds:   [MAR Hold] azithromycin  500 mg Oral Q24H    [MAR Hold] cefTRIAXone (ROCEPHIN) IV  1,000 mg Intravenous Q24H    [MAR Hold] allopurinol  100 mg Oral Daily    [MAR Hold] insulin glargine  30 Units Subcutaneous Nightly    [MAR Hold] pravastatin  10 mg Oral Nightly    [MAR Hold] lactobacillus  1 tablet Oral Daily    [MAR Hold] pantoprazole  40 mg Oral QAM AC    [MAR Hold] dilTIAZem  60 mg Oral 4 times per day    [Held by provider] apixaban  2.5 mg Oral BID    [MAR Hold] metoprolol tartrate  75 mg Oral BID    [MAR Hold] sodium chloride flush  5-40 mL Intravenous 2 times per day    [facility] Hold] insulin lispro  0-12 Units Subcutaneous TID WC    [MAR Hold] insulin lispro  0-6 Units Subcutaneous Nightly     Continuous Infusions:   [MAR Hold] sodium chloride      [MAR Hold] dextrose       CBC:   Recent Labs     07/07/21  0543 07/08/21  0526 07/09/21  0404   WBC 10.1 11.6* 12.3*   HGB 9.4* 9.4* 9.8*    314 323     BMP:    Recent Labs     07/07/21  0543 07/08/21  0526 07/09/21  0404    140 138   K 3.6* 3.5* 3.7    105 102   CO2 25 22 23   BUN 50* 41* 35*   CREATININE 1.71* 1.49* 1.50*   GLUCOSE 243* 227* 246*     INR:   No results for input(s): INR in the last 72 hours.     Objective:   Vitals: BP (!) 124/92   Pulse 108   Temp 98.2 °F (36.8 °C)   Resp 16   Ht 5' (1.524 m)   Wt 229 lb (103.9 kg)   SpO2 100%   BMI 44.72 kg/m²   General appearance: alert and cooperative with exam  HEENT: Head: Normocephalic, no lesions, without obvious abnormality. Neck: No JVD, supple. Lungs: clear to auscultation bilaterally, slighty dim throughout, course LLL  Heart: Irregularly irregular rate and rhythm, S1, S2 normal, no murmur, click, rub or gallop  Abdomen: soft, non-tender; bowel sounds normal; no masses,  no organomegaly, obese  Extremities: extremities normal, atraumatic, no cyanosis or edema  Neurologic: Mental status: Alert, oriented, thought content appropriate    Patient Active Problem List:     HTN (hypertension)     Hypomagnesemia     CKD (chronic kidney disease) stage 3, GFR 30-59 ml/min ([facility])     Vitamin D deficiency     Reactive airway disease that is not asthma     Type 2 diabetes mellitus, without long-term current use of insulin ([facility])     Mixed hyperlipidemia     Nontoxic single thyroid nodule     Pneumonia     Abnormal CT scan, chest     Anemia     Diarrhea    Echocardiogram 7/7/2021:  Left ventricle is normal in size  Global left ventricular systolic function is normal  Estimated ejection fraction is 65 % . Left atrium is moderately dilated. No significant valvular regurgitation or stenosis seen. No pericardial effusion seen. Normal aortic root dimension. Assessment / Acute Cardiac Problems:     1. New onset A. fib with RVR currently on rate control and anticoagulation  2. Minimal troponin elevation consistent with type II MI due to BRANDON on CKD and pneumonia  3. Hypertension  4. Diabetes mellitus  5. Hyperlipidemia  6. Pneumonia  7. BRANDON on CKD  8. Preserved LV systolic function on echo as above with no significant valvular abnormalities    Plan of Treatment:     1. AFib with RVR. Resume CCB & BB currently held for EGD. Resume AC once GI testing completed. 2. We will need long-term anticoagulation with Eliquis on discharge  3. Will consider cardioversion as an outpatient after adequate anticoagulation. 4. Follow up with [facility] in 2 weeks post discharge.      Electronically signed by GERONIMO Umaña NP on 7/9/2021 at 5:05 PM      [facility]  [phone]

## 2021-07-10 VITALS
HEART RATE: 89 BPM | TEMPERATURE: 98.1 F | SYSTOLIC BLOOD PRESSURE: 139 MMHG | OXYGEN SATURATION: 97 % | WEIGHT: 229 LBS | DIASTOLIC BLOOD PRESSURE: 79 MMHG | BODY MASS INDEX: 44.96 KG/M2 | RESPIRATION RATE: 18 BRPM | HEIGHT: 60 IN

## 2021-07-10 LAB
ABSOLUTE EOS #: 0.26 K/UL (ref 0–0.44)
ABSOLUTE IMMATURE GRANULOCYTE: 0.49 K/UL (ref 0–0.3)
ABSOLUTE LYMPH #: 1.96 K/UL (ref 1.1–3.7)
ABSOLUTE MONO #: 0.85 K/UL (ref 0.1–1.2)
ANION GAP SERPL CALCULATED.3IONS-SCNC: 13 MMOL/L (ref 9–17)
BASOPHILS # BLD: 1 % (ref 0–2)
BASOPHILS ABSOLUTE: 0.07 K/UL (ref 0–0.2)
BUN BLDV-MCNC: 28 MG/DL (ref 8–23)
BUN/CREAT BLD: 20 (ref 9–20)
C-REACTIVE PROTEIN: 12.3 MG/L (ref 0–5)
CALCIUM SERPL-MCNC: 8.9 MG/DL (ref 8.6–10.4)
CHLORIDE BLD-SCNC: 103 MMOL/L (ref 98–107)
CO2: 23 MMOL/L (ref 20–31)
CREAT SERPL-MCNC: 1.38 MG/DL (ref 0.5–0.9)
DIFFERENTIAL TYPE: ABNORMAL
EOSINOPHILS RELATIVE PERCENT: 2 % (ref 1–4)
GFR AFRICAN AMERICAN: 46 ML/MIN
GFR NON-AFRICAN AMERICAN: 38 ML/MIN
GFR SERPL CREATININE-BSD FRML MDRD: ABNORMAL ML/MIN/{1.73_M2}
GFR SERPL CREATININE-BSD FRML MDRD: ABNORMAL ML/MIN/{1.73_M2}
GLUCOSE BLD-MCNC: 146 MG/DL (ref 65–105)
GLUCOSE BLD-MCNC: 186 MG/DL (ref 70–99)
HCT VFR BLD CALC: 30.3 % (ref 36.3–47.1)
HEMOGLOBIN: 9.5 G/DL (ref 11.9–15.1)
IMMATURE GRANULOCYTES: 5 %
LYMPHOCYTES # BLD: 18 % (ref 24–43)
MAGNESIUM: 1.7 MG/DL (ref 1.6–2.6)
MCH RBC QN AUTO: 29.6 PG (ref 25.2–33.5)
MCHC RBC AUTO-ENTMCNC: 31.4 G/DL (ref 28.4–34.8)
MCV RBC AUTO: 94.4 FL (ref 82.6–102.9)
MONOCYTES # BLD: 8 % (ref 3–12)
NRBC AUTOMATED: 0 PER 100 WBC
PDW BLD-RTO: 13.5 % (ref 11.8–14.4)
PLATELET # BLD: 297 K/UL (ref 138–453)
PLATELET ESTIMATE: ABNORMAL
PMV BLD AUTO: 10.6 FL (ref 8.1–13.5)
POTASSIUM SERPL-SCNC: 4.1 MMOL/L (ref 3.7–5.3)
RBC # BLD: 3.21 M/UL (ref 3.95–5.11)
RBC # BLD: ABNORMAL 10*6/UL
SEG NEUTROPHILS: 66 % (ref 36–65)
SEGMENTED NEUTROPHILS ABSOLUTE COUNT: 7.33 K/UL (ref 1.5–8.1)
SODIUM BLD-SCNC: 139 MMOL/L (ref 135–144)
WBC # BLD: 11 K/UL (ref 3.5–11.3)
WBC # BLD: ABNORMAL 10*3/UL

## 2021-07-10 PROCEDURE — 85025 COMPLETE CBC W/AUTO DIFF WBC: CPT

## 2021-07-10 PROCEDURE — 6370000000 HC RX 637 (ALT 250 FOR IP): Performed by: INTERNAL MEDICINE

## 2021-07-10 PROCEDURE — 6360000002 HC RX W HCPCS: Performed by: INTERNAL MEDICINE

## 2021-07-10 PROCEDURE — 2580000003 HC RX 258: Performed by: INTERNAL MEDICINE

## 2021-07-10 PROCEDURE — 86140 C-REACTIVE PROTEIN: CPT

## 2021-07-10 PROCEDURE — 83735 ASSAY OF MAGNESIUM: CPT

## 2021-07-10 PROCEDURE — 82947 ASSAY GLUCOSE BLOOD QUANT: CPT

## 2021-07-10 PROCEDURE — 36415 COLL VENOUS BLD VENIPUNCTURE: CPT

## 2021-07-10 PROCEDURE — 80048 BASIC METABOLIC PNL TOTAL CA: CPT

## 2021-07-10 RX ORDER — AZITHROMYCIN 500 MG/1
500 TABLET, FILM COATED ORAL EVERY 24 HOURS
Qty: 3 TABLET | Refills: 0 | Status: SHIPPED | OUTPATIENT
Start: 2021-07-11 | End: 2021-07-14

## 2021-07-10 RX ORDER — CEFUROXIME AXETIL 500 MG/1
500 TABLET ORAL 2 TIMES DAILY
Qty: 14 TABLET | Refills: 0 | Status: SHIPPED | OUTPATIENT
Start: 2021-07-10 | End: 2021-07-17

## 2021-07-10 RX ORDER — DILTIAZEM HYDROCHLORIDE 60 MG/1
60 TABLET, FILM COATED ORAL 4 TIMES DAILY
Qty: 120 TABLET | Refills: 3 | Status: SHIPPED | OUTPATIENT
Start: 2021-07-10 | End: 2021-10-05

## 2021-07-10 RX ORDER — INSULIN GLARGINE 100 [IU]/ML
35 INJECTION, SOLUTION SUBCUTANEOUS NIGHTLY
Qty: 5 PEN | Refills: 3 | Status: SHIPPED | OUTPATIENT
Start: 2021-07-10

## 2021-07-10 RX ORDER — PANTOPRAZOLE SODIUM 40 MG/1
40 TABLET, DELAYED RELEASE ORAL
Qty: 30 TABLET | Refills: 3 | Status: SHIPPED | OUTPATIENT
Start: 2021-07-11 | End: 2021-10-05

## 2021-07-10 RX ORDER — METOPROLOL TARTRATE 75 MG/1
75 TABLET, FILM COATED ORAL 2 TIMES DAILY
Qty: 60 TABLET | Refills: 3 | Status: SHIPPED | OUTPATIENT
Start: 2021-07-10

## 2021-07-10 RX ADMIN — METOPROLOL TARTRATE 75 MG: 50 TABLET, FILM COATED ORAL at 09:55

## 2021-07-10 RX ADMIN — DILTIAZEM HYDROCHLORIDE 60 MG: 60 TABLET, FILM COATED ORAL at 05:40

## 2021-07-10 RX ADMIN — APIXABAN 2.5 MG: 2.5 TABLET, FILM COATED ORAL at 09:55

## 2021-07-10 RX ADMIN — ACETAMINOPHEN 650 MG: 325 TABLET ORAL at 00:02

## 2021-07-10 RX ADMIN — CEFTRIAXONE SODIUM 1000 MG: 1 INJECTION, POWDER, FOR SOLUTION INTRAMUSCULAR; INTRAVENOUS at 09:57

## 2021-07-10 RX ADMIN — INSULIN LISPRO 2 UNITS: 100 INJECTION, SOLUTION INTRAVENOUS; SUBCUTANEOUS at 09:56

## 2021-07-10 RX ADMIN — AZITHROMYCIN MONOHYDRATE 500 MG: 250 TABLET ORAL at 09:59

## 2021-07-10 RX ADMIN — DILTIAZEM HYDROCHLORIDE 60 MG: 60 TABLET, FILM COATED ORAL at 00:02

## 2021-07-10 RX ADMIN — SODIUM CHLORIDE, PRESERVATIVE FREE 10 ML: 5 INJECTION INTRAVENOUS at 09:57

## 2021-07-10 RX ADMIN — PROBIOTIC PRODUCT - TAB 1 TABLET: TAB at 09:56

## 2021-07-10 RX ADMIN — PANTOPRAZOLE SODIUM 40 MG: 40 TABLET, DELAYED RELEASE ORAL at 05:40

## 2021-07-10 RX ADMIN — ALLOPURINOL 100 MG: 100 TABLET ORAL at 09:55

## 2021-07-10 ASSESSMENT — PAIN SCALES - GENERAL
PAINLEVEL_OUTOF10: 0
PAINLEVEL_OUTOF10: 4
PAINLEVEL_OUTOF10: 0

## 2021-07-10 NOTE — DISCHARGE SUMMARY
Hospital Discharge Summary     Patient Identification  Jose Mccoy is a 66 y.o. female. :  1950    Facility: [facility] Status:Inpatient [101]     Admit Date:  7/3/2021  Discharge date and time: No discharge date for patient encounter. Attending Provider: Ancelmo Ramos DO         Admission Diagnoses: Pneumonia [J18.9]    Discharge Diagnoses: <principal problem not specified>  Active Hospital Problems    Diagnosis Date Noted    Abnormal CT scan, chest [R93.89]     Anemia, blood loss [D50.0]     Diarrhea [R19.7]     Nausea [R11.0]     Pneumonia [J18.9] 2021    Type 2 diabetes mellitus, without long-term current use of insulin ([facility] [address] [E11.9] 2019    CKD (chronic kidney disease) stage 3, GFR 30-59 ml/min ([facility] [address] [N18.30] 2018       Admission  Status:fair    Discharge Status: good    Indication for Admission: See H&P    Hospital Course: Per Progress note section. Reason For Admit: Pneumonia and UTI as well as A. fib    Treatments During Hospital Stay: This is a 19-year-old  female who was admitted because of UTI as well as pneumonia of the left lower lobe. She was septic at the time and had A. fib. She was anticoagulated started on antibiotics. She also had acute kidney injury and was seen by nephrology. Was also seen by cardiology. She did well throughout her stay was incidentally found to have esophageal inflammation and underwent endoscopy per GI.  GI did find gastritis H. pylori samples are pending. Patient was treated with community-acquired pneumonia protocol did well throughout her course. Cardiology was able to rate control this patient and anticoagulate her with apixaban. Patient did well and eventually was discharged home follow-up with cardiology and nephrology and with PCP. Plans aAfter Distcharge: 1. We will continue antibiotics with Ceftin for 7 days and azithromycin for 3 more days.   Ceftin can be used to treat UTI and she is sensitive to oral regimen. 2.  We will continue anticoagulation we found out it will be about $40 a month for this patient to be on apixaban. 3.  All her medications were sent to [facility] with confirmation.   4.  Patient should follow-up with us second week of August.    Consults: cardiology, GI and nephrology    Significant Diagnostic Studies: See results section    Lab Results   Component Value Date/Time    WBC 11.0 07/10/2021 04:09 AM    HGB 9.5 (L) 07/10/2021 04:09 AM    HCT 30.3 (L) 07/10/2021 04:09 AM     07/10/2021 04:09 AM    NEUTROABS 7.33 07/10/2021 04:09 AM     Lab Results   Component Value Date/Time     07/10/2021 04:09 AM    K 4.1 07/10/2021 04:09 AM     07/10/2021 04:09 AM    CREATININE 1.38 (H) 07/10/2021 04:09 AM    BUN 28 (H) 07/10/2021 04:09 AM    GLUCOSE 186 (H) 07/10/2021 04:09 AM     Lab Results   Component Value Date    PROBNP 13,599 07/03/2021    PROBNP 919 06/21/2019    PROBNP 1,105 06/20/2019       Echocardiogram 2D W M-Mode    Result Date: 7/6/2021  [facility] Transthoracic Echocardiography Report (TTE)  Patient Name Juliann Bowers Date of Study               07/06/2021               M   Date of      1950  Gender                      Female  Birth   Age          66 year(s)  Race                           Room Number  1004        Height:                     60 inch, 152.4 cm   Corporate ID [id number]    Weight:                     229 pounds, 103.9 kg  #   Patient Acct [de-identified]   BSA:          1.98 m^2      BMI:      44.72  #                                                              kg/m^2   MR #         [id number]     Oral Sikhism                 [id number]   Accession #  [id number]  Interpreting Physician      Carlos Arboleda   Fellow                   Referring Nurse                           Practitioner   Interpreting             Referring Physician         [address]  Fellow  Type of Study   TTE procedure:2D Echocardiogram, M-Mode, Doppler, Color Doppler. Procedure Date Date: 07/06/2021 Start: 11:56 AM Study Location: [facility] Technical Quality: Adequate visualization Indications:Atrial fibrillation. History / Tech. Comments: Procedure explained to patient. Patient Status: Inpatient Height: 60 inches Weight: 229.01 pounds BSA: 1.98 m^2 BMI: 44.72 kg/m^2 CONCLUSIONS Summary Left ventricle is normal in size Global left ventricular systolic function is normal Estimated ejection fraction is 65 % . Left atrium is moderately dilated. No significant valvular regurgitation or stenosis seen. No pericardial effusion seen. Normal aortic root dimension. Signature ----------------------------------------------------------------------------  Electronically signed by Mena Sarah on 07/06/2021  02:33 PM ---------------------------------------------------------------------------- ----------------------------------------------------------------------------  Electronically signed by Priyanka ThomasInterpreting physician) on 07/06/2021  10:52 PM ---------------------------------------------------------------------------- FINDINGS Left Atrium Left atrium is moderately dilated. Left Ventricle Left ventricle is normal in size Global left ventricular systolic function is normal Estimated ejection fraction is 65 % . Right Atrium Right atrium is normal in size. Right Ventricle Normal right ventricular size and function. Mitral Valve Mitral annular calcification is seen. Trivial mitral regurgitation. Aortic Valve Normal aortic valve structure and function without stenosis or regurgitation. Tricuspid Valve Normal tricuspid valve structure and function. No tricuspid regurgitation was seen. Pulmonic Valve The pulmonic valve is normal in structure. Trivial pulmonic insufficiency. Pericardial Effusion No pericardial effusion seen. Miscellaneous Normal aortic root dimension.  M-mode / 2D Measurements & Calculations:   LVIDd:5.4 cm(3.7 - 5.6 cm)       Diastolic BFDXZO:243 ml  BUDER:0.3 cm(2.2 - 4.0 cm)       Systolic TPQOBP:57.0 ml  UMAU:6.5 cm(0.6 - 1.1 cm)        Aortic Root:3 cm(2.0 - 3.7 cm)  LVPWd:1.1 cm(0.6 - 1.1 cm)       LA Dimension: 4.5 cm(1.9 - 4.0 cm)  Fractional Shortenin.74 %    LA volume/Index: 64.4 ml /33m^2  Calculated LVEF (%): 79.11 %   Mitral:                                    Aortic   Valve Area (P1/2-Time): 3.86 cm^2          Peak Velocity: 1.26 m/s  Peak E-Wave: 1.37 m/s                      Peak Gradient: 6.35 mmHg   Peak Gradient: 7.51 mmHg  Deceleration Time: 197 msec  P1/2t: 57 msec  Septal Wall E' velocity:0.06 m/s Lateral Wall E' velocity:0.09 m/s    CT ABDOMEN PELVIS WO CONTRAST Additional Contrast? None    Result Date: 7/3/2021  EXAMINATION: CT OF THE ABDOMEN AND PELVIS WITHOUT CONTRAST 7/3/2021 3:05 pm TECHNIQUE: CT of the abdomen and pelvis was performed without the administration of intravenous contrast. Multiplanar reformatted images are provided for review. Dose modulation, iterative reconstruction, and/or weight based adjustment of the mA/kV was utilized to reduce the radiation dose to as low as reasonably achievable. COMPARISON: None. HISTORY: ORDERING SYSTEM PROVIDED HISTORY: sepsis, unclear eitology persistent diarrhea/abd cramping TECHNOLOGIST PROVIDED HISTORY: sepsis, unclear eitology persistent diarrhea/abd cramping Decision Support Exception - unselect if not a suspected or confirmed emergency medical condition->Emergency Medical Condition (MA) Reason for Exam: Sepsis. Acuity: Acute Type of Exam: Initial FINDINGS: Lower Chest: Patchy consolidative changes seen in the left lower lobe. Tiny left-sided pleural effusion is seen Organs: Adrenal glands appear normal. No splenomegaly. No perisplenic fluid There is a subtle lobular contour to the liver. There are clips from prior cholecystectomy No peripancreatic fluid. No peripancreatic inflammatory change No stones or hydronephrosis on the right.   No stones or hydronephrosis on the left No peripancreatic fluid. No peripancreatic inflammatory change. Trace left-sided pleural effusion is seen GI/Bowel: No significant small bowel distention noted. Mild stool load seen in the colon. Scattered colonic diverticula are seen. Colon is incompletely distended, accentuating its wall thickness. No significant pericolonic stranding Pelvis: No free fluid in pelvis. No pelvic adenopathy. Bladder is incompletely distended Peritoneum/Retroperitoneum: Atherosclerotic change seen in aorta. Tiny retroperitoneal nodes are seen. Bones/Soft Tissues: There is diastases of the rectus muscles. Tiny periumbilical hernia containing fat is seen     Left lower lobe pneumonia with small left-sided pleural effusion Scattered areas of colonic wall thickening are seen, either due to the partially contracted state of the colon or wall thickening from early colitis     CT CHEST WO CONTRAST    Result Date: 7/4/2021  EXAMINATION: CT OF THE CHEST WITHOUT CONTRAST 7/4/2021 12:15 pm TECHNIQUE: CT of the chest was performed without the administration of intravenous contrast. Multiplanar reformatted images are provided for review. Dose modulation, iterative reconstruction, and/or weight based adjustment of the mA/kV was utilized to reduce the radiation dose to as low as reasonably achievable. COMPARISON: None. HISTORY: ORDERING SYSTEM PROVIDED HISTORY: Pneumonia TECHNOLOGIST PROVIDED HISTORY: NO IV CONTRAST Pneumonia Reason for Exam: Pneumonia seen on prior CT Scan. Acuity: Acute Type of Exam: Initial FINDINGS: Mediastinum: Calcifications are seen in the thyroid. A few hypodense nodules are seen measuring 9 mm in size or less, for which no specific imaging follow-up is recommended based on size Small mediastinal and hilar nodes are noted. Mild coronary artery calcification is seen. Aortic valve calcification is seen. Trace pericardial fluid is seen. Small hiatal hernia seen.   There is nonspecific thickening at the GE junction Lungs/pleura: Bandlike opacity seen in the right middle lobe. Bandlike opacity seen in the right lower lobe. No focal consolidation is seen in the right lung. Mild traction bronchiectasis is seen in the right middle lobe. Patchy consolidation is seen in the left lower lobe. Small left-sided pleural effusion is seen. There is adjacent left basilar opacity. Upper Abdomen: Right adrenal gland is normal.  Left adrenal gland is normal. Atherosclerotic change seen in abdominal aorta. There is subtle lobular contour to the liver. Mild stool load is seen in the colon. .  A few scattered colonic diverticula are seen. Soft Tissues/Bones: Spurring is seen in the spine. Spurring is seen in the shoulder joints. Left lower lobe pneumonia with small left-sided pleural effusion. No focal consolidation on the right. XR CHEST PORTABLE    Result Date: 7/3/2021  EXAMINATION: ONE XRAY VIEW OF THE CHEST 7/3/2021 2:26 pm COMPARISON: Chest radiograph performed 06/18/2019. HISTORY: ORDERING SYSTEM PROVIDED HISTORY: chest pain TECHNOLOGIST PROVIDED HISTORY: chest pain Reason for Exam: Pt states she's feeling ill x 6 days. Fatigue, chest pain. AP UPRIGHT PORTABLE Acuity: Acute Type of Exam: Subsequent/Follow-up FINDINGS: There is no acute consolidation or effusion. There is no pneumothorax. The mediastinal structures are unremarkable. The upper abdomen is unremarkable. The extrathoracic soft tissues are unremarkable. There is no acute osseous abnormality. No acute cardiopulmonary process.        Treatments: antibiotics: ceftriaxone and azithromycin, cardiac meds: metoprolol and diltiazem and anticoagulation: Apixaban    Discharge Exam:  Physical Exam  BP (!) 160/72   Pulse 97   Temp 97.6 °F (36.4 °C) (Oral)   Resp 18   Ht 5' (1.524 m)   Wt 229 lb (103.9 kg)   SpO2 97%   BMI 44.72 kg/m²     General Appearance:    Alert, cooperative, no distress, appears stated age   Head: insulin glargine (BASAGLAR KWIKPEN) 100 UNIT/ML injection pen  Inject 35 Units into the skin nightly             metoprolol tartrate 75 MG TABS  Take 75 mg by mouth 2 times daily             pantoprazole (PROTONIX) 40 MG tablet  Take 1 tablet by mouth every morning (before breakfast)             pravastatin (PRAVACHOL) 10 MG tablet  Take 1 tablet by mouth nightly             Probiotic Product (ACIDOPHILUS PROBIOTIC) CAPS capsule  Take 1 capsule by mouth daily                  Medication List      START taking these medications    apixaban 2.5 MG Tabs tablet  Commonly known as: ELIQUIS  Take 1 tablet by mouth 2 times daily     azithromycin 500 MG tablet  Commonly known as: ZITHROMAX  Take 1 tablet by mouth every 24 hours for 3 days  Start taking on: July 11, 2021     cefUROXime 500 MG tablet  Commonly known as: CEFTIN  Take 1 tablet by mouth 2 times daily for 7 days     dilTIAZem 60 MG tablet  Commonly known as: CARDIZEM  Take 1 tablet by mouth 4 times daily     pantoprazole 40 MG tablet  Commonly known as: PROTONIX  Take 1 tablet by mouth every morning (before breakfast)  Start taking on: July 11, 2021        CHANGE how you take these medications    Basaglar KwikPen 100 UNIT/ML injection pen  Generic drug: insulin glargine  Inject 35 Units into the skin nightly  What changed:   · medication strength  · how much to take     Metoprolol Tartrate 75 MG Tabs  Take 75 mg by mouth 2 times daily  What changed:   · medication strength  · how much to take        CONTINUE taking these medications    acidophilus probiotic Caps capsule     allopurinol 100 MG tablet  Commonly known as: ZYLOPRIM     ONE TOUCH ULTRA 2 w/Device Kit     pravastatin 10 MG tablet  Commonly known as: PRAVACHOL  Take 1 tablet by mouth nightly        STOP taking these medications    amLODIPine 10 MG tablet  Commonly known as: NORVASC     aspirin 81 MG EC tablet     Biotin 5000 5 MG Caps  Generic drug: Biotin     calcium carbonate 500 MG Tabs tablet  Commonly known as: OSCAL     glimepiride 4 MG tablet  Commonly known as: AMARYL     hydroCHLOROthiazide 50 MG tablet  Commonly known as: HYDRODIURIL     metFORMIN 500 MG extended release tablet  Commonly known as: GLUCOPHAGE-XR     NONFORMULARY     therapeutic multivitamin-minerals tablet     tiZANidine 4 MG tablet  Commonly known as: ZANAFLEX     valsartan 320 MG tablet  Commonly known as: DIOVAN           Where to Get Your Medications      These medications were sent to [address] [place] [address] 18, [address]  [id number]    Hours: 24-hours Phone: [phone]   · apixaban 2.5 MG Tabs tablet  · azithromycin 500 MG tablet  · Basaglar KwikPen 100 UNIT/ML injection pen  · cefUROXime 500 MG tablet  · dilTIAZem 60 MG tablet  · Metoprolol Tartrate 75 MG Tabs  · pantoprazole 40 MG tablet           Activity: activity as tolerated    Diet: diabetic diet    Wound Care: none needed      Follow-up with Margaret Narayan DO in 4 weeks. Margaret Narayan DO  No discharge date for patient encounter. This note was created with the assistance of a speech-recognition program. Although the intention is to generate a document that actually reflects the content of the visit, no guarantees can be provided that every mistake has been identified and corrected by editing.

## 2021-07-10 NOTE — PROGRESS NOTES
Nephrology Progress Note    Reason for consultation: Management of acute kidney injury. Consulting physician: Benson Gee skulls, DO    Interval history: Patient was seen and examined today she feels well with no chest pain, nausea, vomiting or abdominal pain. She underwent transoral esophagogram gastroduodenoscopy on 7/9/2021 with findings of severe gastritis and biopsies were sent. She is nonoliguric. History of Present Illness: This is a 66 y.o. female with past medical history chronic kidney disease 3 a with baseline creatinine of 1.2-1.4 mg/dL, type 2 diabetes, essential hypertension. Patient presented to the hospital with complaints of lethargy chills not feeling good poor oral intake and decrease in appetite for about 1 week nausea no vomiting. Patient did have few loose stools, patient also complained of cough and saw some streaks of blood twice  Patient also noted tachycardia,notedto be Afib. CT abdomen and pelvis showed left lower lobe pneumonia with small left-sided pleural effusion, scattered areas of colonic wall thickening. Pt denies any history of  prolonged NSAID use. Patient denies dysuria, gross hematuria, flank pain, nocturia, urgency, passing frothy urine or urinary incontinence. There has been no recent exposure to IV contrast.There is no history  of paraprotein disease. Pt denies any history of recurrent UTI or kidney stones. Medication review shows use of ARB's, hydrochlorothiazide, Metformin  Blood pressure stable no hypotension noted.     Objective/     Vitals:    07/09/21 1642 07/09/21 2042 07/10/21 0005 07/10/21 0434   BP: (!) 124/92 (!) 152/82 137/87 (!) 149/91   Pulse: 108 113 100 89   Resp: 16 18 18 18   Temp: 98.2 °F (36.8 °C) 98.4 °F (36.9 °C) 98.4 °F (36.9 °C) 98.2 °F (36.8 °C)   TempSrc:  Oral Oral Oral   SpO2: 100% 97% 96% 97%   Weight:       Height:         24HR INTAKE/OUTPUT:      Intake/Output Summary (Last 24 hours) at 7/10/2021 0559  Last data filed at 7/9/2021 1604  Gross per 24 hour   Intake 680 ml   Output --   Net 680 ml     Constitutional:  Alert, awake, no apparent distress  Cardiovascular: S1, S2 with irregularly irregular rhythm; no pericardial rub or gallop. Respiratory: Crackles left lower lung base otherwise lungs are clear  Abdomen: Full but soft with normal bowel sounds. Ext:  LE edema    Data/  Recent Labs     07/08/21 0526 07/09/21 0404 07/10/21  0409   WBC 11.6* 12.3* 11.0   HGB 9.4* 9.8* 9.5*   HCT 28.8* 30.9* 30.3*   MCV 92.9 93.6 94.4    323 297     Recent Labs     07/08/21  0526 07/09/21  0404 07/10/21  0409    138 139   K 3.5* 3.7 4.1    102 103   CO2 22 23 23   GLUCOSE 227* 246* 186*   MG 1.6 2.0 1.7   BUN 41* 35* 28*   CREATININE 1.49* 1.50* 1.38*   LABGLOM 34* 34* 38*   GFRAA 42* 41* 46*     Assessment/plan:    1. Acute kidney injury superimposed on chronic kidney disease stage III [baseline serum creatinine 1.2 to 1.4 mg/dL] - most consistent with prerenal azotemia. Patient is nonoliguric and renal function is improving. 2.  Systemic hypertension - blood pressure is adequately controlled. 3.  Gastritis - Management per GI service. 4.  Left lower lobe pneumonia - clinically improving. Prognosis is guarded. No renal objection to patient being discharged tody.     Tulio Clemente MD FACP  Attending nephrologist

## 2021-07-10 NOTE — PROGRESS NOTES
Pt taken out per wheelchair. Patient discharged via private auto with family member   Discharge paperwork and instructions given to patient. Patient  acknowledges understanding. Scripts e-scripted to patients pharmacy, [facility] on [address] for [employer], Zithromax, ceftin, cardizem and protonix   New medications and side effects explained. Directions to make follow up appointment given  Discharge checklist completed   No IGNACIO needed. Any questions answered.      Telemetry monitor returned

## 2021-07-12 ENCOUNTER — CARE COORDINATION (OUTPATIENT)
Dept: CASE MANAGEMENT | Age: 71
End: 2021-07-12

## 2021-07-12 NOTE — CARE COORDINATION
[facility] Transitions Initial Follow Up Call- BPCI-A patient (pneumonia- )     Call within 2 business days of discharge: Yes    Patient: Shaun Vazquez Patient : 1950   MRN: [id number]  Reason for Admission: LLL pneumonia, UTI with hematuria, BRANDON, pleural effusion   Discharge Date: 7/10/21 RARS: Readmission Risk Score: 16      Last Discharge [address] [id number]       Complaint Diagnosis Description Type Department Provider    7/3/21 Anorexia; Fatigue Pneumonia of left lung due to infectious organism, unspecified part of lung . .. ED to Hosp-Admission (Discharged) (ADMITTED) SILVIA Narayan, ; Alem Louise . .. Spoke with: Maurice Bourgeois     Call to pt who states she is doing pretty good. States short of breath at times with exertion but better with rest  States eating and drinking better  States occasional loose stool (after taking abx- confirms she is taking probiotic, and will eat yogurt if this continues)   Denies nausea, fever, cough   States Dr Kev Ortiz' office is closed through  so she will be scheduled when open. States she has a call into Dr Clare Christianson (neph) office as she has seen him in the past and would like to continue with him   States she has appt with Dr Chacha Wilson scheduled but not until 10/3 (writer messaged office requesting appt sooner for hospital follow up)    Transitions of Care Initial Call    Was this an external facility discharge? No Discharge Facility: [facility]     Challenges to be reviewed by the provider   Additional needs identified to be addressed with provider: No  none             Method of communication with provider : none      Advance Care Planning:   Does patient have an Advance Directive: not on file; education provided. Was this a readmission?  No  Patient stated reason for admission: pneumonia, UTI, weakness, no appetite   Patients top risk factors for readmission: lack of knowledge about disease and medical condition-pneumonia, UTI with [facility]    Care Transition Nurse (CTN) contacted the patient by telephone to perform post hospital discharge assessment. Verified name and  with patient as identifiers. Provided introduction to self, and explanation of the CTN role. CTN reviewed discharge instructions, medical action plan and red flags with patient who verbalized understanding. Patient given an opportunity to ask questions and does not have any further questions or concerns at this time. Were discharge instructions available to patient? Yes. Reviewed appropriate site of care based on symptoms and resources available to patient including: PCP, Specialist and When to call 911. The patient agrees to contact the PCP office for questions related to their healthcare. Medication reconciliation was performed with patient, who verbalizes understanding of administration of home medications. Advised obtaining a 90-day supply of all daily and as-needed medications. Covid Risk Education     Educated patient about risk for severe COVID-19 due to risk factors according to CDC guidelines. CTN reviewed discharge instructions, medical action plan and red flag symptoms with the patient who verbalized understanding. Discussed COVID vaccination status: Yes and has been vaccinated. Education provided on COVID-19 vaccination as appropriate. Discussed exposure protocols and quarantine with CDC Guidelines. Patient was given an opportunity to verbalize any questions and concerns and agrees to contact CTN or health care provider for questions related to their healthcare. Reviewed and educated patient on any new and changed medications related to discharge diagnosis. Was patient discharged with a pulse oximeter? No Discussed and confirmed pulse oximeter discharge instructions and when to notify provider or seek emergency care. CTN provided contact information. Plan for follow-up call in 5-7 days based on severity of symptoms and risk factors.   Plan for next call: symptom management-breathing, appetite and follow up appointment-see if GI appt moved up and neph appt scheduled           Non-face-to-face services provided:  Scheduled appointment with PCP-office closed through 7/30- will be scheduled after  Scheduled appointment with Specialist-confirms appt with GI 10/13 (\"nothing sooner\")- writer messaged office to move this up, states she has a call to her nephrologists office- waiting for call back   Obtained and reviewed discharge summary and/or continuity of care documents  Assessment and support for treatment adherence and medication management-confirms new and changed meds    Care Transitions 24 Hour Call    Do you have any ongoing symptoms?: No  Do you have a copy of your discharge instructions?: Yes  Do you have all of your prescriptions and are they filled?: Yes  Have you been contacted by a [address]?: No  Have you scheduled your follow up appointment?: Yes  How are you going to get to your appointment?: Car - family or friend to transport  Were you discharged with any Home Care or Post Acute Services: No  Do you feel like you have everything you need to keep you well at home?: Yes  Care Transitions Interventions         Follow Up  Future Appointments   Date Time Provider Rm Carmichael   10/13/2021  1:30 PM Lynn Alaniz MD [address] [facility]       Paulino Ahumada, RN

## 2021-07-13 LAB — SURGICAL PATHOLOGY REPORT: NORMAL

## 2021-07-13 NOTE — PROGRESS NOTES
GI CLINIC FOLLOW UP    INTERVAL HISTORY:   No referring provider defined for this encounter. Chief Complaint   Patient presents with    Follow-up     Patient is f/u on hospital visit where she had EGD for abnormal CT. HISTORY OF PRESENT ILLNESS: Darius Reyes is a 66 y.o. female , referred for evaluation of anemia, status post cholecystectomy diarrhea, GI bleed  Seen in the Hospital    on [facility], they want to do an EGD and colonoscopy on her in the hospital  Refused colon in hospital   egd was done results are as below with biopsies showing no H. pylori no malignancy  She is feeling a lot better  She is taking PPI with Protonix  Denied any black stool blood in the stool nausea vomiting fever or chills  Her iron studies B32 and folic acid were all normal during the hospital admission  She does have a minor diarrhea, according to what she eats she said is not very disturbing she knows what to avoid she needs to avoid fatty meals. Her liver enzymes are normal  She had a CAT scan when she was in the hospital which showed thickening of the distal esophagus but the EGD did not show any abnormality there, and she did have thickening of the colon but again she refused to have colonoscopy      Findings:     Retropharyngeal area was grossly normal appearing  Severe gastritis with erosion in the antrum biopsies were taken to rule out H. Pylori     Edema and erythema of the duodenal especially in the bulb consistent with duodenitis biopsies were taken     No abnormality of the GE junction to concur with the CT findings     The scope was removed and the patient tolerated the procedure well.      Recommendations/Plan:   1. F/U Biopsies  2. PPI  3. Treat H. pylori if positive  4. Okay to discharge and follow as an outpatient  5.  We will try to talk to the patient as an outpatient see if she would be agreed to do colonoscopy     Electronically signed by Grupo Klein MD  on 7/9/2021 at 3:56 PM   -- Diagnosis --   1.  STOMACH, BIOPSIES:- MILD CHRONIC ACTIVE GASTRITIS.- NEGATIVE FOR   HELICOBACTER PYLORI INFECTION AND INTESTINAL METAPLASIA. 2.  DUODENUM, BIOPSY:   - MILD, FOCAL ACTIVE DUODENITIS        Past Medical,Family, and Social History reviewed and does contribute to the patient presentingcondition. Patient's PMH/PSH,SH,PSYCH Hx, MEDs, ALLERGIES, and ROS were all reviewed and updated in the appropriate sections.     PAST MEDICAL HISTORY:  Past Medical History:   Diagnosis Date    Anemia     Arrhythmia     Arthritis     Asthma     CKD (chronic kidney disease) stage 1, GFR 90 ml/min or greater     DM type 2 (diabetes mellitus, type 2) ([facility])     HTN (hypertension)     Hypomagnesemia        Past Surgical History:   Procedure Laterality Date    BUNIONECTOMY      CARPAL TUNNEL RELEASE Left 03/2018    CARPAL TUNNEL RELEASE Right 10/2018    CHOLECYSTECTOMY      FINGER TRIGGER RELEASE Right     ring finger    HERNIA REPAIR      HYSTERECTOMY      TONSILLECTOMY AND ADENOIDECTOMY      UPPER GASTROINTESTINAL ENDOSCOPY N/A 7/9/2021    EGD BIOPSY performed by Marcelle Shaw MD at [facility]:    Current Outpatient Medications:     insulin glargine (BASAGLAR KWIKPEN) 100 UNIT/ML injection pen, Inject 35 Units into the skin nightly, Disp: 5 pen, Rfl: 3    cefUROXime (CEFTIN) 500 MG tablet, Take 1 tablet by mouth 2 times daily for 7 days, Disp: 14 tablet, Rfl: 0    apixaban (ELIQUIS) 2.5 MG TABS tablet, Take 1 tablet by mouth 2 times daily, Disp: 60 tablet, Rfl: 3    metoprolol tartrate 75 MG TABS, Take 75 mg by mouth 2 times daily, Disp: 60 tablet, Rfl: 3    dilTIAZem (CARDIZEM) 60 MG tablet, Take 1 tablet by mouth 4 times daily, Disp: 120 tablet, Rfl: 3    pantoprazole (PROTONIX) 40 MG tablet, Take 1 tablet by mouth every morning (before breakfast), Disp: 30 tablet, Rfl: 3    allopurinol (ZYLOPRIM) 100 MG tablet, TAKE 1 TABLET BY MOUTH EVERY DAY, Disp: , Rfl:    Probiotic Product (ACIDOPHILUS PROBIOTIC) CAPS capsule, Take 1 capsule by mouth daily, Disp: , Rfl:     pravastatin (PRAVACHOL) 10 MG tablet, Take 1 tablet by mouth nightly, Disp: 30 tablet, Rfl: 3    Blood Glucose Monitoring Suppl (ONE TOUCH ULTRA 2) W/DEVICE KIT, USE BID UTD, Disp: , Rfl: 0    ALLERGIES:   Allergies   Allergen Reactions    Iodine     Shellfish-Derived Products     Victoza [Liraglutide]      Abdominal pain       FAMILY HISTORY:       Problem Relation Age of Onset    Heart Disease Mother     Cancer Father         bladder         SOCIAL HISTORY:   Social History     Socioeconomic History    Marital status: Single     Spouse name: Not on file    Number of children: Not on file    Years of education: Not on file    Highest education level: Not on file   Occupational History    Not on file   Tobacco Use    Smoking status: Never Smoker    Smokeless tobacco: Never Used   Vaping Use    Vaping Use: Never used   Substance and Sexual Activity    Alcohol use: No    Drug use: No    Sexual activity: Not on file   Other Topics Concern    Not on file   Social History Narrative    Not on file     Social Determinants of Health     Financial Resource Strain:     Difficulty of Paying Living Expenses:    Food Insecurity:     Worried About Running Out of Food in the Last Year:     Ran Out of Food in the Last Year:    Transportation Needs:     Lack of Transportation (Medical):      Lack of Transportation (Non-Medical):    Physical Activity:     Days of Exercise per Week:     Minutes of Exercise per Session:    Stress:     Feeling of Stress :    Social Connections:     Frequency of Communication with Friends and Family:     Frequency of Social Gatherings with Friends and Family:     Attends Orthodox Services:     Active Member of Clubs or Organizations:     Attends Club or Organization Meetings:     Marital Status:    Intimate Partner Violence:     Fear of Current or Ex-Partner:     Emotionally Abused:     Physically Abused:     Sexually Abused:        REVIEW OF SYSTEMS: A 12-point review of systemswas obtained and pertinent positives and negatives were enumerated above in the history of present illness. All other reviewed systems / symptoms were negative. Review of Systems   Constitutional: Positive for fatigue. Negative for appetite change and unexpected weight change. HENT: Negative for trouble swallowing. Respiratory: Positive for shortness of breath (with excertion). Negative for cough, choking and wheezing. Cardiovascular: Negative for chest pain, palpitations and leg swelling. Gastrointestinal: Positive for diarrhea (sometimes). Negative for abdominal distention, abdominal pain, anal bleeding, blood in stool, constipation, nausea, rectal pain and vomiting. Genitourinary: Negative for difficulty urinating. Allergic/Immunologic: Negative for environmental allergies and food allergies. Neurological: Negative for dizziness, weakness, light-headedness, numbness and headaches. Hematological: Bruises/bleeds easily. Psychiatric/Behavioral: Negative for sleep disturbance. The patient is not nervous/anxious.             LABORATORY DATA: Reviewed  Lab Results   Component Value Date    WBC 11.0 07/10/2021    HGB 9.5 (L) 07/10/2021    HCT 30.3 (L) 07/10/2021    MCV 94.4 07/10/2021     07/10/2021     07/10/2021    K 4.1 07/10/2021     07/10/2021    CO2 23 07/10/2021    BUN 28 (H) 07/10/2021    CREATININE 1.38 (H) 07/10/2021    LABALBU 3.7 07/03/2021    BILITOT 0.38 07/03/2021    ALKPHOS 78 07/03/2021    AST 37 (H) 07/03/2021    ALT 30 07/03/2021    INR 1.1 07/04/2021         Lab Results   Component Value Date    RBC 3.21 (L) 07/10/2021    HGB 9.5 (L) 07/10/2021    MCV 94.4 07/10/2021    MCH 29.6 07/10/2021    MCHC 31.4 07/10/2021    RDW 13.5 07/10/2021    MPV 10.6 07/10/2021    BASOPCT 1 07/10/2021    LYMPHSABS 1.96 07/10/2021    MONOSABS 0.85 07/10/2021 NEUTROABS 7.33 07/10/2021    EOSABS 0.26 07/10/2021    BASOSABS 0.07 07/10/2021         DIAGNOSTIC TESTING:     Echocardiogram 2D W M-Mode    Result Date: 7/6/2021  [facility] Transthoracic Echocardiography Report (TTE)  Patient Name Rylee Avery Date of Study               07/06/2021               M   Date of      1950  Gender                      Female  Birth   Age          66 year(s)  Race                           Room Number  1004        Height:                     60 inch, 152.4 cm   Corporate ID [id number]    Weight:                     229 pounds, 103.9 kg  #   Patient Abbie[place]side [de-identified]   BSA:          1.98 m^2      BMI:      44.72  #                                                              kg/m^2   MR #         [id number]     Sonographer                 RM HOUSER   Accession #  [id number]  Interpreting Physician      Miles gN   Fellow                   Referring Nurse                           Practitioner   Interpreting             Referring Physician         [address]  Fellow  Type of Study   TTE procedure:2D Echocardiogram, M-Mode, Doppler, Color Doppler. Procedure Date Date: 07/06/2021 Start: 11:56 AM Study Location: [address] [place] [address] Technical Quality: Adequate visualization Indications:Atrial fibrillation. History / Tech. Comments: Procedure explained to patient. Patient Status: Inpatient Height: 60 inches Weight: 229.01 pounds BSA: 1.98 m^2 BMI: 44.72 kg/m^2 CONCLUSIONS Summary Left ventricle is normal in size Global left ventricular systolic function is normal Estimated ejection fraction is 65 % . Left atrium is moderately dilated. No significant valvular regurgitation or stenosis seen. No pericardial effusion seen. Normal aortic root dimension.  Signature ----------------------------------------------------------------------------  Electronically signed by Jie Goodson) on 07/06/2021  02:33 PM ---------------------------------------------------------------------------- ----------------------------------------------------------------------------  Electronically signed by Eden Thomas(Interpreting physician) on 2021  10:52 PM ---------------------------------------------------------------------------- FINDINGS Left Atrium Left atrium is moderately dilated. Left Ventricle Left ventricle is normal in size Global left ventricular systolic function is normal Estimated ejection fraction is 65 % . Right Atrium Right atrium is normal in size. Right Ventricle Normal right ventricular size and function. Mitral Valve Mitral annular calcification is seen. Trivial mitral regurgitation. Aortic Valve Normal aortic valve structure and function without stenosis or regurgitation. Tricuspid Valve Normal tricuspid valve structure and function. No tricuspid regurgitation was seen. Pulmonic Valve The pulmonic valve is normal in structure. Trivial pulmonic insufficiency. Pericardial Effusion No pericardial effusion seen. Miscellaneous Normal aortic root dimension.  M-mode / 2D Measurements & Calculations:   LVIDd:5.4 cm(3.7 - 5.6 cm)       Diastolic SLQVNL:744 ml  CUPZT:0.0 cm(2.2 - 4.0 cm)       Systolic YENHSV:62.8 ml  IJYE:1.7 cm(0.6 - 1.1 cm)        Aortic Root:3 cm(2.0 - 3.7 cm)  LVPWd:1.1 cm(0.6 - 1.1 cm)       LA Dimension: 4.5 cm(1.9 - 4.0 cm)  Fractional Shortenin.74 %    LA volume/Index: 64.4 ml /33m^2  Calculated LVEF (%): 79.11 %   Mitral:                                    Aortic   Valve Area (P1/2-Time): 3.86 cm^2          Peak Velocity: 1.26 m/s  Peak E-Wave: 1.37 m/s                      Peak Gradient: 6.35 mmHg   Peak Gradient: 7.51 mmHg  Deceleration Time: 197 msec  P1/2t: 57 msec  Septal Wall E' velocity:0.06 m/s Lateral Wall E' velocity:0.09 m/s    CT ABDOMEN PELVIS WO CONTRAST Additional Contrast? None    Result Date: 7/3/2021  EXAMINATION: CT OF THE ABDOMEN AND PELVIS WITHOUT CONTRAST 7/3/2021 3:05 pm TECHNIQUE: CT of the abdomen and pelvis was performed without the administration of intravenous contrast. Multiplanar reformatted images are provided for review. Dose modulation, iterative reconstruction, and/or weight based adjustment of the mA/kV was utilized to reduce the radiation dose to as low as reasonably achievable. COMPARISON: None. HISTORY: ORDERING SYSTEM PROVIDED HISTORY: sepsis, unclear eitology persistent diarrhea/abd cramping TECHNOLOGIST PROVIDED HISTORY: sepsis, unclear eitology persistent diarrhea/abd cramping Decision Support Exception - unselect if not a suspected or confirmed emergency medical condition->Emergency Medical Condition (MA) Reason for Exam: Sepsis. Acuity: Acute Type of Exam: Initial FINDINGS: Lower Chest: Patchy consolidative changes seen in the left lower lobe. Tiny left-sided pleural effusion is seen Organs: Adrenal glands appear normal. No splenomegaly. No perisplenic fluid There is a subtle lobular contour to the liver. There are clips from prior cholecystectomy No peripancreatic fluid. No peripancreatic inflammatory change No stones or hydronephrosis on the right. No stones or hydronephrosis on the left No peripancreatic fluid. No peripancreatic inflammatory change. Trace left-sided pleural effusion is seen GI/Bowel: No significant small bowel distention noted. Mild stool load seen in the colon. Scattered colonic diverticula are seen. Colon is incompletely distended, accentuating its wall thickness. No significant pericolonic stranding Pelvis: No free fluid in pelvis. No pelvic adenopathy. Bladder is incompletely distended Peritoneum/Retroperitoneum: Atherosclerotic change seen in aorta. Tiny retroperitoneal nodes are seen. Bones/Soft Tissues: There is diastases of the rectus muscles.   Tiny periumbilical hernia containing fat is seen     Left lower lobe pneumonia with small left-sided pleural effusion Scattered areas of colonic wall thickening are seen, either due to the partially contracted state of the colon or wall thickening from early colitis     CT CHEST WO CONTRAST    Result Date: 7/4/2021  EXAMINATION: CT OF THE CHEST WITHOUT CONTRAST 7/4/2021 12:15 pm TECHNIQUE: CT of the chest was performed without the administration of intravenous contrast. Multiplanar reformatted images are provided for review. Dose modulation, iterative reconstruction, and/or weight based adjustment of the mA/kV was utilized to reduce the radiation dose to as low as reasonably achievable. COMPARISON: None. HISTORY: ORDERING SYSTEM PROVIDED HISTORY: Pneumonia TECHNOLOGIST PROVIDED HISTORY: NO IV CONTRAST Pneumonia Reason for Exam: Pneumonia seen on prior CT Scan. Acuity: Acute Type of Exam: Initial FINDINGS: Mediastinum: Calcifications are seen in the thyroid. A few hypodense nodules are seen measuring 9 mm in size or less, for which no specific imaging follow-up is recommended based on size Small mediastinal and hilar nodes are noted. Mild coronary artery calcification is seen. Aortic valve calcification is seen. Trace pericardial fluid is seen. Small hiatal hernia seen. There is nonspecific thickening at the GE junction Lungs/pleura: Bandlike opacity seen in the right middle lobe. Bandlike opacity seen in the right lower lobe. No focal consolidation is seen in the right lung. Mild traction bronchiectasis is seen in the right middle lobe. Patchy consolidation is seen in the left lower lobe. Small left-sided pleural effusion is seen. There is adjacent left basilar opacity. Upper Abdomen: Right adrenal gland is normal.  Left adrenal gland is normal. Atherosclerotic change seen in abdominal aorta. There is subtle lobular contour to the liver. Mild stool load is seen in the colon. .  A few scattered colonic diverticula are seen. Soft Tissues/Bones: Spurring is seen in the spine. Spurring is seen in the shoulder joints.      Left lower lobe pneumonia with small left-sided pleural effusion. No focal consolidation on the right. XR CHEST PORTABLE    Result Date: 7/3/2021  EXAMINATION: ONE XRAY VIEW OF THE CHEST 7/3/2021 2:26 pm COMPARISON: Chest radiograph performed 06/18/2019. HISTORY: ORDERING SYSTEM PROVIDED HISTORY: chest pain TECHNOLOGIST PROVIDED HISTORY: chest pain Reason for Exam: Pt states she's feeling ill x 6 days. Fatigue, chest pain. AP UPRIGHT PORTABLE Acuity: Acute Type of Exam: Subsequent/Follow-up FINDINGS: There is no acute consolidation or effusion. There is no pneumothorax. The mediastinal structures are unremarkable. The upper abdomen is unremarkable. The extrathoracic soft tissues are unremarkable. There is no acute osseous abnormality. No acute cardiopulmonary process. PHYSICAL EXAMINATION: Vital signs reviewed per the nursing documentation. /64   Pulse 65   Temp 97.9 °F (36.6 °C)   Wt 225 lb (102.1 kg)   BMI 43.94 kg/m²   Body mass index is 43.94 kg/m². Physical Exam  Vitals and nursing note reviewed. Constitutional:       General: She is not in acute distress. Appearance: She is well-developed. She is not diaphoretic. HENT:      Head: Normocephalic. Mouth/Throat:      Pharynx: No oropharyngeal exudate. Eyes:      General: No scleral icterus. Pupils: Pupils are equal, round, and reactive to light. Neck:      Thyroid: No thyromegaly. Vascular: No JVD. Trachea: No tracheal deviation. Cardiovascular:      Rate and Rhythm: Normal rate and regular rhythm. Heart sounds: Normal heart sounds. No murmur heard. Pulmonary:      Effort: Pulmonary effort is normal. No respiratory distress. Breath sounds: Normal breath sounds. No wheezing. Abdominal:      General: Bowel sounds are normal. There is no distension. Palpations: Abdomen is soft. Tenderness: There is no abdominal tenderness. There is no guarding or rebound.       Comments: No ascites   Musculoskeletal:         General: Normal range of motion. Cervical back: Normal range of motion and neck supple. Skin:     General: Skin is warm. Coloration: Skin is not pale. Findings: No erythema or rash. Comments: She is not diaphoretic   Neurological:      Mental Status: She is alert and oriented to person, place, and time. Deep Tendon Reflexes: Reflexes are normal and symmetric. Psychiatric:         Behavior: Behavior normal.         Thought Content: Thought content normal.         Judgment: Judgment normal.           IMPRESSION: Ms. León Rm is a 66 y.o. female with      Diagnosis Orders   1. Anemia, blood loss  COLONOSCOPY W/ OR W/O BIOPSY   2. Abnormal CT scan, chest  COLONOSCOPY W/ OR W/O BIOPSY   3. Diarrhea, unspecified type     4. S/P david     Although the patient diarrhea most likely is status post cholecystectomy syndrome as she says it depends about her diet especially fatty meals when she has it. But yet with the abnormality seen on the CAT scan and with her anemia I told her she needs a colonoscopy Long discussion was made with the patient she is still reluctant she said she will think about it and schedule it when she slows down as she has been on a lot of medication and issues with her heart    Diet/life style/natural hx /complication of the dx were all explained in details   Past medical, past surgical, social history, psychiatric history, medications or allergies, all reviewed and  updated    Thank you for allowing me to participate in the care of Ms. León Rm. For any further questions please do not hesitate to contact me. I have reviewed and agree with the ROS entered by the MA/RN. Note is dictated utilizing voice recognition software. Unfortunately this leads to occasional typographical errors. Please contact our office if you have any questions.       Sol Tellez MD  [facility]  O: #[phone]

## 2021-07-14 ENCOUNTER — TELEPHONE (OUTPATIENT)
Dept: GASTROENTEROLOGY | Age: 71
End: 2021-07-14

## 2021-07-14 ENCOUNTER — OFFICE VISIT (OUTPATIENT)
Dept: GASTROENTEROLOGY | Age: 71
End: 2021-07-14
Payer: MEDICARE

## 2021-07-14 VITALS
BODY MASS INDEX: 43.94 KG/M2 | HEART RATE: 65 BPM | TEMPERATURE: 97.9 F | DIASTOLIC BLOOD PRESSURE: 64 MMHG | WEIGHT: 225 LBS | SYSTOLIC BLOOD PRESSURE: 137 MMHG

## 2021-07-14 DIAGNOSIS — R93.89 ABNORMAL CT SCAN, CHEST: ICD-10-CM

## 2021-07-14 DIAGNOSIS — D50.0 ANEMIA, BLOOD LOSS: Primary | ICD-10-CM

## 2021-07-14 DIAGNOSIS — Z90.49 S/P CHOLE: ICD-10-CM

## 2021-07-14 DIAGNOSIS — R19.7 DIARRHEA, UNSPECIFIED TYPE: ICD-10-CM

## 2021-07-14 LAB — GLUCOSE BLD-MCNC: 213 MG/DL (ref 65–105)

## 2021-07-14 PROCEDURE — G8417 CALC BMI ABV UP PARAM F/U: HCPCS | Performed by: INTERNAL MEDICINE

## 2021-07-14 PROCEDURE — 1111F DSCHRG MED/CURRENT MED MERGE: CPT | Performed by: INTERNAL MEDICINE

## 2021-07-14 PROCEDURE — 4040F PNEUMOC VAC/ADMIN/RCVD: CPT | Performed by: INTERNAL MEDICINE

## 2021-07-14 PROCEDURE — 1036F TOBACCO NON-USER: CPT | Performed by: INTERNAL MEDICINE

## 2021-07-14 PROCEDURE — G8400 PT W/DXA NO RESULTS DOC: HCPCS | Performed by: INTERNAL MEDICINE

## 2021-07-14 PROCEDURE — 3017F COLORECTAL CA SCREEN DOC REV: CPT | Performed by: INTERNAL MEDICINE

## 2021-07-14 PROCEDURE — 1123F ACP DISCUSS/DSCN MKR DOCD: CPT | Performed by: INTERNAL MEDICINE

## 2021-07-14 PROCEDURE — 99214 OFFICE O/P EST MOD 30 MIN: CPT | Performed by: INTERNAL MEDICINE

## 2021-07-14 PROCEDURE — 1090F PRES/ABSN URINE INCON ASSESS: CPT | Performed by: INTERNAL MEDICINE

## 2021-07-14 PROCEDURE — G8427 DOCREV CUR MEDS BY ELIG CLIN: HCPCS | Performed by: INTERNAL MEDICINE

## 2021-07-14 ASSESSMENT — ENCOUNTER SYMPTOMS
COUGH: 0
RECTAL PAIN: 0
ABDOMINAL PAIN: 0
TROUBLE SWALLOWING: 0
BLOOD IN STOOL: 0
SHORTNESS OF BREATH: 1
CONSTIPATION: 0
WHEEZING: 0
ANAL BLEEDING: 0
VOMITING: 0
DIARRHEA: 1
ABDOMINAL DISTENTION: 0
CHOKING: 0
NAUSEA: 0

## 2021-07-14 NOTE — TELEPHONE ENCOUNTER
Pt to call back to schedule colon proc ordered at [address] on 7/14/21. She states she has a lot going on right now and wants to wait to sched.

## 2021-07-20 ENCOUNTER — CARE COORDINATION (OUTPATIENT)
Dept: CASE MANAGEMENT | Age: 71
End: 2021-07-20

## 2021-07-20 NOTE — CARE COORDINATION
[facility] Trans[facility] Follow Up Call    2021    Patient: Adilia Zayas  Patient : 1950   MRN: <[id number]>  Reason for Admission:   Discharge Date: 7/10/21 RARS: Readmission Risk Score: 16         Spoke with: Veronica Solomon, patient    Contacted patient for BPCI-A follow up. Lubna Wells stated that she is doing pretty good. Reports she continues to become short of breath with exertion. No respiratory issues at rest.  BP and HR have been fine. Reports her HR has been leveling off and running between 70-80 bpm.  No c/o chest pain/discomfort, pressure, tightness. Reports she may have occasional episodes of loose stools. Stated she does not have a gallbladder and doctor informed her that can sometimes happen because she does not have a gallbladder. She is eating and drinking fluids w/o issues. She has upcoming appointments with cardiology and nephrology. She does not have an appointment scheduled with PCP because the office is closed. Was told by her doctor to call the office during the second week in August.  No needs or concerns at this time. Will continue to follow.       Follow Up  Future Appointments   Date Time Provider Rm Carmichael   2021  1:15 PM Ilir Gill MD [facility]       Susan Patel, RN

## 2021-07-27 ENCOUNTER — CARE COORDINATION (OUTPATIENT)
Dept: CASE MANAGEMENT | Age: 71
End: 2021-07-27

## 2021-07-27 NOTE — CARE COORDINATION
[facility] Transitions Follow Up Call    2021    Patient: Dariel Pro  Patient : 1950   MRN: [id number]  Reason for Admission:   Discharge Date: 7/10/21 RARS: Readmission Risk Score: 16         Spoke with: Cornelio Best, patient    Contacted patient for BPCI-A follow up. Bryon Joe stated that she is doing okay. Reports she becomes short of breath with \"some\" exertion. Stated she recovers quickly. She continues to tire easily but stated she is doing chores and able to do her grocery shopping. She is monitoring her BP and HR. She stated her HR has leveled off and continues to run between 70-80 bpm.  She reports the highest systolic BP has been in the 150's. She has 3 appointments scheduled for next week. Stated she will be seeing the cardiologist, endocrinologist, nephrologist.  She is aware of when to contact her provider. She confirmed she is taking all of her medications as prescribed. No questions or concerns at this time. Will continue to follow. Next follow up scheduled for the week after her appointments. Care Transitions Subsequent and Final Call    Subsequent and Final Calls  Do you have any ongoing symptoms?: Yes  Patient-reported symptoms: Shortness of Breath  Have your medications changed?: No  Do you have any questions related to your medications?: No  Do you currently have any active services?: No  Do you have any needs or concerns that I can assist you with?: No  Care Transitions Interventions  Other Interventions:            Follow Up  Future Appointments   Date Time Provider Rm Carmichael   2021  1:15 PM MD [facility]       Jimmie Garza RN

## 2021-08-02 ENCOUNTER — HOSPITAL ENCOUNTER (OUTPATIENT)
Age: 71
Setting detail: SPECIMEN
Discharge: HOME | End: 2021-08-02
Payer: MEDICARE

## 2021-08-02 DIAGNOSIS — N18.30 BENIGN HYPERTENSION WITH CKD (CHRONIC KIDNEY DISEASE) STAGE III (HCC): ICD-10-CM

## 2021-08-02 DIAGNOSIS — N18.32 STAGE 3B CHRONIC KIDNEY DISEASE (HCC): ICD-10-CM

## 2021-08-02 DIAGNOSIS — E13.22 SECONDARY DIABETES MELLITUS WITH STAGE 3 CHRONIC KIDNEY DISEASE (HCC): ICD-10-CM

## 2021-08-02 DIAGNOSIS — R80.9 MICROALBUMINURIA: ICD-10-CM

## 2021-08-02 DIAGNOSIS — D63.1 ANEMIA IN STAGE 3B CHRONIC KIDNEY DISEASE (HCC): ICD-10-CM

## 2021-08-02 DIAGNOSIS — E83.39 HYPERPHOSPHATEMIA: ICD-10-CM

## 2021-08-02 DIAGNOSIS — I12.9 BENIGN HYPERTENSION WITH CKD (CHRONIC KIDNEY DISEASE) STAGE III (HCC): ICD-10-CM

## 2021-08-02 DIAGNOSIS — E83.41 HYPERMAGNESEMIA: ICD-10-CM

## 2021-08-02 DIAGNOSIS — N18.32 ANEMIA IN STAGE 3B CHRONIC KIDNEY DISEASE (HCC): ICD-10-CM

## 2021-08-02 DIAGNOSIS — N18.30 SECONDARY DIABETES MELLITUS WITH STAGE 3 CHRONIC KIDNEY DISEASE (HCC): ICD-10-CM

## 2021-08-02 DIAGNOSIS — E87.5 HYPERKALEMIA: ICD-10-CM

## 2021-08-02 LAB
-: ABNORMAL
ABSOLUTE EOS #: 0.23 K/UL (ref 0–0.44)
ABSOLUTE IMMATURE GRANULOCYTE: 0.03 K/UL (ref 0–0.3)
ABSOLUTE LYMPH #: 1.69 K/UL (ref 1.1–3.7)
ABSOLUTE MONO #: 0.59 K/UL (ref 0.1–1.2)
AMORPHOUS: ABNORMAL
ANION GAP SERPL CALCULATED.3IONS-SCNC: 14 MMOL/L (ref 9–17)
ANION GAP SERPL CALCULATED.3IONS-SCNC: 14 MMOL/L (ref 9–17)
BACTERIA: ABNORMAL
BASOPHILS # BLD: 1 % (ref 0–2)
BASOPHILS ABSOLUTE: 0.06 K/UL (ref 0–0.2)
BILIRUBIN URINE: NEGATIVE
BUN BLDV-MCNC: 26 MG/DL (ref 8–23)
BUN BLDV-MCNC: 26 MG/DL (ref 8–23)
BUN/CREAT BLD: ABNORMAL (ref 9–20)
CALCIUM SERPL-MCNC: 8.8 MG/DL (ref 8.6–10.4)
CALCIUM SERPL-MCNC: 8.8 MG/DL (ref 8.6–10.4)
CASTS UA: ABNORMAL /LPF (ref 0–8)
CHLORIDE BLD-SCNC: 102 MMOL/L (ref 98–107)
CHLORIDE BLD-SCNC: 102 MMOL/L (ref 98–107)
CO2: 20 MMOL/L (ref 20–31)
CO2: 20 MMOL/L (ref 20–31)
COLOR: YELLOW
CREAT SERPL-MCNC: 1.56 MG/DL (ref 0.5–0.9)
CREAT SERPL-MCNC: 1.56 MG/DL (ref 0.5–0.9)
CREATININE URINE: 24.3 MG/DL (ref 28–217)
CRYSTALS, UA: ABNORMAL /HPF
DIFFERENTIAL TYPE: ABNORMAL
EOSINOPHILS RELATIVE PERCENT: 3 % (ref 1–4)
EPITHELIAL CELLS UA: ABNORMAL /HPF (ref 0–5)
GFR AFRICAN AMERICAN: 40 ML/MIN
GFR AFRICAN AMERICAN: 40 ML/MIN
GFR NON-AFRICAN AMERICAN: 33 ML/MIN
GFR NON-AFRICAN AMERICAN: 33 ML/MIN
GFR SERPL CREATININE-BSD FRML MDRD: ABNORMAL ML/MIN/{1.73_M2}
GLUCOSE BLD-MCNC: 359 MG/DL (ref 70–99)
GLUCOSE URINE: ABNORMAL
HCT VFR BLD CALC: 35.5 % (ref 36.3–47.1)
HEMOGLOBIN: 11.2 G/DL (ref 11.9–15.1)
IMMATURE GRANULOCYTES: 0 %
KETONES, URINE: NEGATIVE
LEUKOCYTE ESTERASE, URINE: NEGATIVE
LYMPHOCYTES # BLD: 23 % (ref 24–43)
MAGNESIUM: 1.8 MG/DL (ref 1.6–2.6)
MCH RBC QN AUTO: 28.9 PG (ref 25.2–33.5)
MCHC RBC AUTO-ENTMCNC: 31.5 G/DL (ref 28.4–34.8)
MCV RBC AUTO: 91.7 FL (ref 82.6–102.9)
MICROALBUMIN/CREAT 24H UR: 77 MG/L
MICROALBUMIN/CREAT UR-RTO: 317 MCG/MG CREAT
MONOCYTES # BLD: 8 % (ref 3–12)
MUCUS: ABNORMAL
NITRITE, URINE: NEGATIVE
NRBC AUTOMATED: 0 PER 100 WBC
OTHER OBSERVATIONS UA: ABNORMAL
PDW BLD-RTO: 13.3 % (ref 11.8–14.4)
PH UA: 5.5 (ref 5–8)
PHOSPHORUS: 4.2 MG/DL (ref 2.6–4.5)
PLATELET # BLD: 185 K/UL (ref 138–453)
PLATELET ESTIMATE: ABNORMAL
PMV BLD AUTO: 12.9 FL (ref 8.1–13.5)
POTASSIUM SERPL-SCNC: 4.7 MMOL/L (ref 3.7–5.3)
POTASSIUM SERPL-SCNC: 4.7 MMOL/L (ref 3.7–5.3)
PROTEIN UA: ABNORMAL
RBC # BLD: 3.87 M/UL (ref 3.95–5.11)
RBC # BLD: ABNORMAL 10*6/UL
RBC UA: ABNORMAL /HPF (ref 0–4)
RENAL EPITHELIAL, UA: ABNORMAL /HPF
SEG NEUTROPHILS: 65 % (ref 36–65)
SEGMENTED NEUTROPHILS ABSOLUTE COUNT: 4.73 K/UL (ref 1.5–8.1)
SODIUM BLD-SCNC: 136 MMOL/L (ref 135–144)
SODIUM BLD-SCNC: 136 MMOL/L (ref 135–144)
SPECIFIC GRAVITY UA: 1.01 (ref 1–1.03)
TRICHOMONAS: ABNORMAL
TURBIDITY: CLEAR
URINE HGB: NEGATIVE
UROBILINOGEN, URINE: NORMAL
VITAMIN D 25-HYDROXY: 22.8 NG/ML (ref 30–100)
WBC # BLD: 7.3 K/UL (ref 3.5–11.3)
WBC # BLD: ABNORMAL 10*3/UL
WBC UA: ABNORMAL /HPF (ref 0–5)
YEAST: ABNORMAL

## 2021-08-06 ENCOUNTER — CARE COORDINATION (OUTPATIENT)
Dept: CASE MANAGEMENT | Age: 71
End: 2021-08-06

## 2021-08-19 ENCOUNTER — CARE COORDINATION (OUTPATIENT)
Dept: CASE MANAGEMENT | Age: 71
End: 2021-08-19

## 2021-09-01 ENCOUNTER — CARE COORDINATION (OUTPATIENT)
Dept: CASE MANAGEMENT | Age: 71
End: 2021-09-01

## 2021-09-03 ENCOUNTER — CARE COORDINATION (OUTPATIENT)
Dept: CASE MANAGEMENT | Age: 71
End: 2021-09-03

## 2021-09-07 ENCOUNTER — CARE COORDINATION (OUTPATIENT)
Dept: CARE COORDINATION | Age: 71
End: 2021-09-07

## 2021-09-07 NOTE — CARE COORDINATION
Adilia Zayas  September 7, 2021    Initial Referral Reason: Diabetes     Patient Care Team:  Tomas Ying MD as PCP - Luana Mancera MD as PCP - [facility] Provider  Susan Patel RN as Care Transitions Nurse  Jorge Streeter MD as Consulting Physician (Gastroenterology)  Ge Flores RD, LD as Dietitian (Dietitian)    Past Medical History:    Current Outpatient Medications   Medication Sig Dispense Refill    metFORMIN (GLUCOPHAGE-XR) 500 MG extended release tablet       ONETOUCH ULTRA strip USE AS DIRECTED TWICE DAILY      B-D ULTRAFINE III SHORT PEN 31G X 8 MM MISC AS DIRECTED 1 TIME PER DAY 90 DAYS      tiZANidine (ZANAFLEX) 4 MG tablet       glimepiride (AMARYL) 4 MG tablet Take 4 mg by mouth 2 times daily      insulin glargine (BASAGLAR KWIKPEN) 100 UNIT/ML injection pen Inject 35 Units into the skin nightly 5 pen 3    metoprolol tartrate 75 MG TABS Take 75 mg by mouth 2 times daily 60 tablet 3    dilTIAZem (CARDIZEM) 60 MG tablet Take 1 tablet by mouth 4 times daily 120 tablet 3    pantoprazole (PROTONIX) 40 MG tablet Take 1 tablet by mouth every morning (before breakfast) 30 tablet 3    allopurinol (ZYLOPRIM) 100 MG tablet TAKE 1 TABLET BY MOUTH EVERY DAY      Probiotic Product (ACIDOPHILUS PROBIOTIC) CAPS capsule Take 1 capsule by mouth daily      pravastatin (PRAVACHOL) 10 MG tablet Take 1 tablet by mouth nightly 30 tablet 3    Blood Glucose Monitoring Suppl (ONE TOUCH ULTRA 2) W/DEVICE KIT USE BID UTD  0     No current facility-administered medications for this visit.        Biochemical Data, Medical Tests and Procedures:    No results found for: LABA1C  No results found for: EAG    No results found for: CHOL  No results found for: TRIG  Lab Results   Component Value Date    HDL 76 04/07/2021     No results found for: [address]  No results found for: LABVLDL  Lab Results   Component Value Date    CHOLHDLRATIO 2.3 04/07/2021       Lab Results   Component Value Date    WBC 7.3 08/02/2021 HGB 11.2 (L) 08/02/2021    HCT 35.5 (L) 08/02/2021    MCV 91.7 08/02/2021     08/02/2021       Lab Results   Component Value Date    CREATININE 1.56 (H) 08/02/2021    BUN 26 (H) 08/02/2021     08/02/2021    K 4.7 08/02/2021     08/02/2021    CO2 20 08/02/2021         Anthropometric Measurements:    Height: 61 inches (154.9 cm)   Weight: 224 lb (101.7 kg)   BMI: 42.36 (obesity class III)    IBW: 105 lb (47.7 kg) +-10%  %IBW: 213.3%  AdBW: 153 lb (69.5 kg)     Physical Exam Findings:  Deferred    Nutrition Interview: RD called pt, explained reason for call and role in care. Pt states appetite is \"fine\", typically eats 2-3 meals/day. See food recall below. RD explained the importance of having a consistent carbohydrate intake throughout the day to help keep blood sugars steady, avoiding spikes and dips. Reviewed which foods contain carbohydrates and which foods do not contain carbohydrates. Explained carbohydrates are the main source of fuel for our bodies and the importance of choosing healthy sources such as whole grains, fruits and vegetables. Explained carbohydrates in food raise blood glucose and the importance of having balanced meals/snacks to help keep blood sugar steady. Discussed how eating a carbohydrate alone such as a banana versus a banana with peanut butter will affect blood sugar differently. Explained the components of a balanced meal using the MyPlate CGLRBFVFI-8/8 plate fruits and/or vegetables, 1/4 plate protein and 1/4 plate starchy carbohydrates with 8 oz glass of low fat milk if desired. RD used pt's breakfast example to explain which components are complete and which components are incomplete. Discussed ways to make the meal more balanced- adding a piece of fruit and using whole wheat bread. RD reiterated the importance of eating consistently throughout the day- discussed focusing on eating a balanced snack at lunch daily.  Explained the components of a balanced snack include a serving of protein with a serving of carbohydrates. Pt verbalizes understanding. Reviewed the importance of taking medicine as directed and checking BS daily. Pt states this AM  mg/dL. Pt states BS usually runs between 100-120 mg/dL. Pt explains the other night she went out to dinner and the following morning, BS was 200 mg/dL. RD reviewed watching portion sizes when eating out. RD reviewed the appropriate times to check BS and the BS goals. Pt has no nutrition related questions or concerns at this time. RD offered to mail educational handouts to pt to reinforce concepts discussed during phone conversation, pt accepted. RD verified address. 24-Hour Diet Recall  Breakfast  Consumed: piece of toast with eggs or portion of cottage cheese with fruit     Lunch  Consumed: pt explains she does not always eat lunch but if she does then it is usually a piece of fruit with cheese     Dinner  Consumed: a salad with protein and veggie; pt states she does not eat a starchy CHO in evening     Beverages: water and iced tea    Frequency of meals away from home: not very often at all    Blood sugar checks: pt states this AM  mg/dL    Nutrition Diagnosis:  #1 Problem Altered nutrition-related lab values       Etiology related to uncontrolled type 2 diabetes       Signs/Symptoms as evidenced by pt report of BS \"up and down\" and POC Glucose Fingerstick 146 mg/dL on 7/10/21    Nutrition Intervention:     Estimated Needs  diabetic diet providing 1400 kcals to promote wt maintenance (Rosibel Diallo based on AdBW for obesity class III). Estimated daily CHO Needs: 193 g (based on 45-65% total calorie intake)  Estimated daily Protein Needs: 56-70 g (based on 0.8-1.0 g/kg based on AdBW for obesity class III)  Estimated daily Fluid Needs: 64 oz. #1 Nutrition Information: Provided patient with Managing Blood Sugar, Checking Blood Sugar, Type 2 Diabetes Nutrition Therapy handouts.  For reinforcement of concepts discussed during nutrition interview. #2 Nutrition Counseling: Used open-ended questions to assess patients perceived susceptibility and severity of disease state. Discussed potential impact of health threat on patient's lifestyle. Used open-ended questions to assess patient's perception regarding benefits of and barriers to implementation of nutrition therapy. #3 Nutrition Education: Clearly defined the benefits of nutrition therapy. Summarized and affirmed positive aspects of current nutrition patterns. Provided education regarding value of adherence to diabetic diet. Discussed ways to establish applying concepts of alternatives and choices regarding implementation of diet. Explored ideas for small, incremental goals to initiate behavior change. Monitoring and Evaluation:    Indicator/Goal Criteria   #1 Eat balanced meals consistently throughout the day. #1 Focus on making meals more balanced using the MyPlate [id number]-2/5 plate fruits and/or vegetables, 1/4 plate protein and 1/4 plate starchy carbohydrates with 8 oz glass of low fat milk if desired. #2 Monitor BS daily and keep a log. #2 Check BS daily- before meals or 1-2 hours after the start of a meal.     Follow Up: RD will call pt in 2 weeks to follow up and make sure pt received handouts in mail. RD will answer any nutrition related questions at this time.      [address] [facility], [facility] [address]

## 2021-09-13 ENCOUNTER — CARE COORDINATION (OUTPATIENT)
Dept: CASE MANAGEMENT | Age: 71
End: 2021-09-13

## 2021-09-21 ENCOUNTER — CARE COORDINATION (OUTPATIENT)
Dept: CARE COORDINATION | Age: 71
End: 2021-09-21

## 2021-09-21 NOTE — CARE COORDINATION
Eveline Reach  9/21/2021    Registered Dietitian Progress Note for Care Coordination    Assessment: Sheryle Pepper is a 66 y.o. female. RD referred for DM. RD spoke with patient for initial nutrition assessment on 9/7. RD called to follow up with pt today 9/21. Pt states she received the educational handouts in the mail- found the information helpful. . RD discussed previous goals with pt. Pt is trying to eat balanced meals/snacks consistently throughout the day. Pt is eating breakfast and dinner and trying to eat a balanced snack for lunch instead of skipping the meal. RD reviewed the components of a balanced snack include a serving of protein and a serving of CHO. Pt states last night for dinner she ate beef stew with 1/4 cup of brown rice. Pt is checking BS daily- per pt yesterday AM BS 89 mg/dL and today  mg/dL. Pt has no nutrition related questions or concerns at this time. RD offered to follow up with patient in a few weeks, pt prefers to call RD in the future as needed. Barriers to meeting goals: overwhelmed by complexity of regimen and stress    Action:  Reiterated the importance of eating balanced meals/snacks consistently and not skipping meals. Reiterated the importance of checking BS daily and taking medicine as directed. See assessment note aobve. Nutrition Monitoring and Evaluation  Indicator/Goal Criteria Progress   #1 Eat balanced meals consistently throughout the day. #1 Focus on making meals more balanced using the MyPlate BKBDZMIRZ-5/6 plate fruits and/or vegetables, 1/4 plate protein and 1/4 plate starchy carbohydrates with 8 oz glass of low fat milk if desired. #1 Pt is eating breakfast and dinner. Pt is trying to eat a small snack at lunch. #2  Monitor BS daily and keep a log #2 Check BS daily- before meals or 1-2 hours after the start of a meal. #2 Pt is checking BS daily- per pt yesterday AM BS 89 mg/dL and today  mg/dL.       Plan of Care:  RD encouraged pt to keep working toward goals set. RD explained to pt this is final follow up call and provided contact information to pt. Encouraged pt to call RD in future with any nutrition related questions or concerns. Follow Up:    Final follow up call today 9/21/21. RD will continue to follow/assist with patient return call.         [address] [facility], [address] [place] [address]

## 2021-09-24 ENCOUNTER — CARE COORDINATION (OUTPATIENT)
Dept: CASE MANAGEMENT | Age: 71
End: 2021-09-24

## 2021-09-24 NOTE — CARE COORDINATION
[facility] Transitions Follow Up Call    2021    Patient: Lashanda Hanna  Patient : 1950   MRN: [id number]  Reason for Admission:   Discharge Date: 7/10/21 RARS: Readmission Risk Score: 16         Spoke with: Javier Mesa, patient    Contacted patient for BPCI-A follow up. Spoke very briefly with patient. Racheal Mistry stated that she has been doing fine. No longer having any signs/symptoms. She stated no problems with eating or drinking fluids. Reports she will be due for second rounds of follow up appointments soon. No questions or concerns at this time. Will continue to follow. Care Transitions Subsequent and Final Call    Subsequent and Final Calls  Do you have any ongoing symptoms?: No  Have your medications changed?: No  Do you have any questions related to your medications?: No  Do you have any needs or concerns that I can assist you with?: No  Identified Barriers: None  Care Transitions Interventions  Other Interventions:            Follow Up  Future Appointments   Date Time Provider Rm Carmichael   10/5/2021  1:15 PM MD ANGELITA Bhagat [facility]       Chay Talavera RN

## 2021-09-28 ENCOUNTER — TELEPHONE (OUTPATIENT)
Dept: GASTROENTEROLOGY | Age: 71
End: 2021-09-28

## 2021-09-28 RX ORDER — POLYETHYLENE GLYCOL 3350, SODIUM SULFATE ANHYDROUS, SODIUM BICARBONATE, SODIUM CHLORIDE, POTASSIUM CHLORIDE 236; 22.74; 6.74; 5.86; 2.97 G/4L; G/4L; G/4L; G/4L; G/4L
4 POWDER, FOR SOLUTION ORAL ONCE
Qty: 4000 ML | Refills: 0 | Status: SHIPPED | OUTPATIENT
Start: 2021-09-28 | End: 2021-09-28

## 2021-09-28 NOTE — TELEPHONE ENCOUNTER
Pt called office to schedule colon that was ordered at [facility] 7/14/21. Pt now scheduled for [facility] [place] colon Fri 12/3/21 @ 815am golytely-cm- vaccinated. Reviewed bowel prep instructions over the phone and mailed Ge Méndez copy to pt's home address. Eliquis clearance sent dr Torres Minaya and PCP dr Hima Medina.

## 2021-09-29 ENCOUNTER — HOSPITAL ENCOUNTER (OUTPATIENT)
Age: 71
Setting detail: SPECIMEN
Discharge: HOME | End: 2021-09-29
Payer: MEDICARE

## 2021-09-29 DIAGNOSIS — D63.1 ANEMIA IN STAGE 3B CHRONIC KIDNEY DISEASE (HCC): ICD-10-CM

## 2021-09-29 DIAGNOSIS — R73.09 ELEVATED GLUCOSE: ICD-10-CM

## 2021-09-29 DIAGNOSIS — N18.32 STAGE 3B CHRONIC KIDNEY DISEASE (HCC): ICD-10-CM

## 2021-09-29 DIAGNOSIS — R80.9 MICROALBUMINURIA: ICD-10-CM

## 2021-09-29 DIAGNOSIS — N18.30 SECONDARY DIABETES MELLITUS WITH STAGE 3 CHRONIC KIDNEY DISEASE (HCC): ICD-10-CM

## 2021-09-29 DIAGNOSIS — N18.30 BENIGN HYPERTENSION WITH CKD (CHRONIC KIDNEY DISEASE) STAGE III (HCC): ICD-10-CM

## 2021-09-29 DIAGNOSIS — R79.9 ELEVATED BUN: ICD-10-CM

## 2021-09-29 DIAGNOSIS — N18.32 ANEMIA IN STAGE 3B CHRONIC KIDNEY DISEASE (HCC): ICD-10-CM

## 2021-09-29 DIAGNOSIS — E56.9 VITAMIN DEFICIENCY: ICD-10-CM

## 2021-09-29 DIAGNOSIS — E13.22 SECONDARY DIABETES MELLITUS WITH STAGE 3 CHRONIC KIDNEY DISEASE (HCC): ICD-10-CM

## 2021-09-29 DIAGNOSIS — I12.9 BENIGN HYPERTENSION WITH CKD (CHRONIC KIDNEY DISEASE) STAGE III (HCC): ICD-10-CM

## 2021-09-29 DIAGNOSIS — R80.9 PROTEINURIA, UNSPECIFIED TYPE: ICD-10-CM

## 2021-09-29 LAB
-: ABNORMAL
ABSOLUTE EOS #: 0.22 K/UL (ref 0–0.44)
ABSOLUTE IMMATURE GRANULOCYTE: <0.03 K/UL (ref 0–0.3)
ABSOLUTE LYMPH #: 1.45 K/UL (ref 1.1–3.7)
ABSOLUTE MONO #: 0.62 K/UL (ref 0.1–1.2)
AMORPHOUS: ABNORMAL
ANION GAP SERPL CALCULATED.3IONS-SCNC: 15 MMOL/L (ref 9–17)
BACTERIA: ABNORMAL
BASOPHILS # BLD: 1 % (ref 0–2)
BASOPHILS ABSOLUTE: 0.05 K/UL (ref 0–0.2)
BILIRUBIN URINE: NEGATIVE
BUN BLDV-MCNC: 38 MG/DL (ref 8–23)
CALCIUM SERPL-MCNC: 8.9 MG/DL (ref 8.6–10.4)
CASTS UA: ABNORMAL /LPF (ref 0–8)
CHLORIDE BLD-SCNC: 109 MMOL/L (ref 98–107)
CO2: 16 MMOL/L (ref 20–31)
COLOR: YELLOW
CREAT SERPL-MCNC: 1.34 MG/DL (ref 0.5–0.9)
CREATININE URINE: 44.1 MG/DL (ref 28–217)
CRYSTALS, UA: ABNORMAL /HPF
DIFFERENTIAL TYPE: ABNORMAL
EOSINOPHILS RELATIVE PERCENT: 3 % (ref 1–4)
EPITHELIAL CELLS UA: ABNORMAL /HPF (ref 0–5)
GFR AFRICAN AMERICAN: 47 ML/MIN
GFR NON-AFRICAN AMERICAN: 39 ML/MIN
GFR SERPL CREATININE-BSD FRML MDRD: ABNORMAL ML/MIN/{1.73_M2}
GFR SERPL CREATININE-BSD FRML MDRD: ABNORMAL ML/MIN/{1.73_M2}
GLUCOSE URINE: NEGATIVE
HCT VFR BLD CALC: 38.8 % (ref 36.3–47.1)
HEMOGLOBIN: 12.3 G/DL (ref 11.9–15.1)
IMMATURE GRANULOCYTES: 0 %
KETONES, URINE: NEGATIVE
LEUKOCYTE ESTERASE, URINE: NEGATIVE
LYMPHOCYTES # BLD: 21 % (ref 24–43)
MAGNESIUM: 2 MG/DL (ref 1.6–2.6)
MCH RBC QN AUTO: 28.6 PG (ref 25.2–33.5)
MCHC RBC AUTO-ENTMCNC: 31.7 G/DL (ref 28.4–34.8)
MCV RBC AUTO: 90.2 FL (ref 82.6–102.9)
MICROALBUMIN/CREAT 24H UR: 65 MG/L
MICROALBUMIN/CREAT UR-RTO: 147 MCG/MG CREAT
MONOCYTES # BLD: 9 % (ref 3–12)
MUCUS: ABNORMAL
NITRITE, URINE: NEGATIVE
NRBC AUTOMATED: 0 PER 100 WBC
OTHER OBSERVATIONS UA: ABNORMAL
PDW BLD-RTO: 13.6 % (ref 11.8–14.4)
PH UA: 5.5 (ref 5–8)
PHOSPHORUS: 4.4 MG/DL (ref 2.6–4.5)
PLATELET # BLD: ABNORMAL K/UL (ref 138–453)
PLATELET ESTIMATE: ABNORMAL
PLATELET, FLUORESCENCE: 154 K/UL (ref 138–453)
PLATELET, IMMATURE FRACTION: 8.9 % (ref 1.1–10.3)
PMV BLD AUTO: ABNORMAL FL (ref 8.1–13.5)
POTASSIUM SERPL-SCNC: 4.5 MMOL/L (ref 3.7–5.3)
PROTEIN UA: ABNORMAL
RBC # BLD: 4.3 M/UL (ref 3.95–5.11)
RBC # BLD: ABNORMAL 10*6/UL
RBC UA: ABNORMAL /HPF (ref 0–4)
RENAL EPITHELIAL, UA: ABNORMAL /HPF
SEG NEUTROPHILS: 67 % (ref 36–65)
SEGMENTED NEUTROPHILS ABSOLUTE COUNT: 4.71 K/UL (ref 1.5–8.1)
SODIUM BLD-SCNC: 140 MMOL/L (ref 135–144)
SPECIFIC GRAVITY UA: 1.01 (ref 1–1.03)
TRICHOMONAS: ABNORMAL
TURBIDITY: CLEAR
URINE HGB: NEGATIVE
UROBILINOGEN, URINE: NORMAL
VITAMIN D 25-HYDROXY: 26.7 NG/ML (ref 30–100)
WBC # BLD: 7.1 K/UL (ref 3.5–11.3)
WBC # BLD: ABNORMAL 10*3/UL
WBC UA: ABNORMAL /HPF (ref 0–5)
YEAST: ABNORMAL

## 2021-10-01 NOTE — TELEPHONE ENCOUNTER
Rec'd PCP Dr Leny Matute clearance 10/1. Pt cleared w/restrictions.  Per clearance cardiology needs to clear Eliquis

## 2021-10-07 ENCOUNTER — CARE COORDINATION (OUTPATIENT)
Dept: CASE MANAGEMENT | Age: 71
End: 2021-10-07

## 2021-10-19 ENCOUNTER — HOSPITAL ENCOUNTER (OUTPATIENT)
Age: 71
Setting detail: SPECIMEN
Discharge: HOME | End: 2021-10-19
Payer: MEDICARE

## 2021-10-19 DIAGNOSIS — E13.22 SECONDARY DIABETES MELLITUS WITH STAGE 3 CHRONIC KIDNEY DISEASE (HCC): ICD-10-CM

## 2021-10-19 DIAGNOSIS — I12.9 BENIGN HYPERTENSION WITH CKD (CHRONIC KIDNEY DISEASE) STAGE III (HCC): ICD-10-CM

## 2021-10-19 DIAGNOSIS — N18.30 SECONDARY DIABETES MELLITUS WITH STAGE 3 CHRONIC KIDNEY DISEASE (HCC): ICD-10-CM

## 2021-10-19 DIAGNOSIS — R80.9 MICROALBUMINURIA: ICD-10-CM

## 2021-10-19 DIAGNOSIS — E56.9 VITAMIN DEFICIENCY: ICD-10-CM

## 2021-10-19 DIAGNOSIS — N18.30 BENIGN HYPERTENSION WITH CKD (CHRONIC KIDNEY DISEASE) STAGE III (HCC): ICD-10-CM

## 2021-10-19 DIAGNOSIS — N18.32 STAGE 3B CHRONIC KIDNEY DISEASE (HCC): ICD-10-CM

## 2021-10-19 LAB
ANION GAP SERPL CALCULATED.3IONS-SCNC: 17 MMOL/L (ref 9–17)
BUN BLDV-MCNC: 31 MG/DL (ref 8–23)
BUN/CREAT BLD: ABNORMAL (ref 9–20)
CALCIUM SERPL-MCNC: 8.8 MG/DL (ref 8.6–10.4)
CHLORIDE BLD-SCNC: 105 MMOL/L (ref 98–107)
CO2: 18 MMOL/L (ref 20–31)
CREAT SERPL-MCNC: 1.36 MG/DL (ref 0.5–0.9)
GFR AFRICAN AMERICAN: 46 ML/MIN
GFR NON-AFRICAN AMERICAN: 38 ML/MIN
GFR SERPL CREATININE-BSD FRML MDRD: ABNORMAL ML/MIN/{1.73_M2}
GFR SERPL CREATININE-BSD FRML MDRD: ABNORMAL ML/MIN/{1.73_M2}
GLUCOSE BLD-MCNC: 239 MG/DL (ref 70–99)
POTASSIUM SERPL-SCNC: 4.5 MMOL/L (ref 3.7–5.3)
SODIUM BLD-SCNC: 140 MMOL/L (ref 135–144)

## 2021-10-20 LAB
ANTI DNA DOUBLE STRANDED: 1.7 IU/ML
ANTI-NUCLEAR ANTIBODY (ANA): NEGATIVE
ENA ANTIBODIES SCREEN: 0.2 U/ML

## 2021-11-04 NOTE — TELEPHONE ENCOUNTER
Writer spoke to pt over the phone in regards to receiving cardiac clearance; pt is at low cardiac risk, but acceptable for the planned surgical procedure. Writer instructed pt to stop Eliquis on 12/1/21 for two days. Pt voices her understanding.

## 2021-12-02 ENCOUNTER — ANESTHESIA EVENT (OUTPATIENT)
Dept: OPERATING ROOM | Age: 71
End: 2021-12-02
Payer: MEDICARE

## 2021-12-03 ENCOUNTER — HOSPITAL ENCOUNTER (OUTPATIENT)
Age: 71
Setting detail: OUTPATIENT SURGERY
Discharge: HOME | End: 2021-12-03
Attending: INTERNAL MEDICINE | Admitting: INTERNAL MEDICINE
Payer: MEDICARE

## 2021-12-03 ENCOUNTER — ANESTHESIA (OUTPATIENT)
Dept: OPERATING ROOM | Age: 71
End: 2021-12-03
Payer: MEDICARE

## 2021-12-03 VITALS
RESPIRATION RATE: 21 BRPM | SYSTOLIC BLOOD PRESSURE: 114 MMHG | OXYGEN SATURATION: 82 % | DIASTOLIC BLOOD PRESSURE: 60 MMHG

## 2021-12-03 VITALS
RESPIRATION RATE: 17 BRPM | BODY MASS INDEX: 40.97 KG/M2 | HEART RATE: 86 BPM | SYSTOLIC BLOOD PRESSURE: 141 MMHG | TEMPERATURE: 97.2 F | DIASTOLIC BLOOD PRESSURE: 70 MMHG | WEIGHT: 217 LBS | OXYGEN SATURATION: 99 % | HEIGHT: 61 IN

## 2021-12-03 LAB — GLUCOSE BLD-MCNC: 106 MG/DL (ref 65–105)

## 2021-12-03 PROCEDURE — 82947 ASSAY GLUCOSE BLOOD QUANT: CPT

## 2021-12-03 PROCEDURE — 2580000003 HC RX 258: Performed by: ANESTHESIOLOGY

## 2021-12-03 PROCEDURE — 2709999900 HC NON-CHARGEABLE SUPPLY: Performed by: INTERNAL MEDICINE

## 2021-12-03 PROCEDURE — 3609010400 HC COLONOSCOPY POLYPECTOMY HOT BIOPSY: Performed by: INTERNAL MEDICINE

## 2021-12-03 PROCEDURE — 3700000000 HC ANESTHESIA ATTENDED CARE: Performed by: INTERNAL MEDICINE

## 2021-12-03 PROCEDURE — 45385 COLONOSCOPY W/LESION REMOVAL: CPT | Performed by: INTERNAL MEDICINE

## 2021-12-03 PROCEDURE — 3700000001 HC ADD 15 MINUTES (ANESTHESIA): Performed by: INTERNAL MEDICINE

## 2021-12-03 PROCEDURE — 7100000011 HC PHASE II RECOVERY - ADDTL 15 MIN: Performed by: INTERNAL MEDICINE

## 2021-12-03 PROCEDURE — 7100000010 HC PHASE II RECOVERY - FIRST 15 MIN: Performed by: INTERNAL MEDICINE

## 2021-12-03 PROCEDURE — 88305 TISSUE EXAM BY PATHOLOGIST: CPT

## 2021-12-03 PROCEDURE — 2500000003 HC RX 250 WO HCPCS: Performed by: NURSE ANESTHETIST, CERTIFIED REGISTERED

## 2021-12-03 PROCEDURE — 6360000002 HC RX W HCPCS: Performed by: NURSE ANESTHETIST, CERTIFIED REGISTERED

## 2021-12-03 RX ORDER — SODIUM CHLORIDE 9 MG/ML
INJECTION, SOLUTION INTRAVENOUS CONTINUOUS
Status: DISCONTINUED | OUTPATIENT
Start: 2021-12-03 | End: 2021-12-03 | Stop reason: HOSPADM

## 2021-12-03 RX ORDER — SODIUM CHLORIDE 0.9 % (FLUSH) 0.9 %
10 SYRINGE (ML) INJECTION PRN
Status: DISCONTINUED | OUTPATIENT
Start: 2021-12-03 | End: 2021-12-03 | Stop reason: HOSPADM

## 2021-12-03 RX ORDER — SODIUM CHLORIDE, SODIUM LACTATE, POTASSIUM CHLORIDE, CALCIUM CHLORIDE 600; 310; 30; 20 MG/100ML; MG/100ML; MG/100ML; MG/100ML
INJECTION, SOLUTION INTRAVENOUS CONTINUOUS
Status: DISCONTINUED | OUTPATIENT
Start: 2021-12-03 | End: 2021-12-03 | Stop reason: HOSPADM

## 2021-12-03 RX ORDER — LIDOCAINE HYDROCHLORIDE 20 MG/ML
INJECTION, SOLUTION EPIDURAL; INFILTRATION; INTRACAUDAL; PERINEURAL PRN
Status: DISCONTINUED | OUTPATIENT
Start: 2021-12-03 | End: 2021-12-03 | Stop reason: SDUPTHER

## 2021-12-03 RX ORDER — SODIUM CHLORIDE 0.9 % (FLUSH) 0.9 %
10 SYRINGE (ML) INJECTION EVERY 12 HOURS SCHEDULED
Status: DISCONTINUED | OUTPATIENT
Start: 2021-12-03 | End: 2021-12-03 | Stop reason: HOSPADM

## 2021-12-03 RX ORDER — LIDOCAINE HYDROCHLORIDE 10 MG/ML
1 INJECTION, SOLUTION EPIDURAL; INFILTRATION; INTRACAUDAL; PERINEURAL
Status: DISCONTINUED | OUTPATIENT
Start: 2021-12-03 | End: 2021-12-03 | Stop reason: HOSPADM

## 2021-12-03 RX ORDER — SODIUM CHLORIDE 9 MG/ML
25 INJECTION, SOLUTION INTRAVENOUS PRN
Status: DISCONTINUED | OUTPATIENT
Start: 2021-12-03 | End: 2021-12-03 | Stop reason: HOSPADM

## 2021-12-03 RX ORDER — PROPOFOL 10 MG/ML
INJECTION, EMULSION INTRAVENOUS PRN
Status: DISCONTINUED | OUTPATIENT
Start: 2021-12-03 | End: 2021-12-03 | Stop reason: SDUPTHER

## 2021-12-03 RX ADMIN — PROPOFOL 20 MG: 10 INJECTION, EMULSION INTRAVENOUS at 08:17

## 2021-12-03 RX ADMIN — PROPOFOL 30 MG: 10 INJECTION, EMULSION INTRAVENOUS at 08:14

## 2021-12-03 RX ADMIN — PROPOFOL 30 MG: 10 INJECTION, EMULSION INTRAVENOUS at 08:25

## 2021-12-03 RX ADMIN — PROPOFOL 30 MG: 10 INJECTION, EMULSION INTRAVENOUS at 08:21

## 2021-12-03 RX ADMIN — PROPOFOL 50 MG: 10 INJECTION, EMULSION INTRAVENOUS at 08:09

## 2021-12-03 RX ADMIN — SODIUM CHLORIDE, POTASSIUM CHLORIDE, SODIUM LACTATE AND CALCIUM CHLORIDE: 600; 310; 30; 20 INJECTION, SOLUTION INTRAVENOUS at 07:27

## 2021-12-03 RX ADMIN — LIDOCAINE HYDROCHLORIDE 100 MG: 20 INJECTION, SOLUTION EPIDURAL; INFILTRATION; INTRACAUDAL; PERINEURAL at 08:10

## 2021-12-03 RX ADMIN — PROPOFOL 30 MG: 10 INJECTION, EMULSION INTRAVENOUS at 08:33

## 2021-12-03 RX ADMIN — PROPOFOL 40 MG: 10 INJECTION, EMULSION INTRAVENOUS at 08:28

## 2021-12-03 RX ADMIN — PROPOFOL 30 MG: 10 INJECTION, EMULSION INTRAVENOUS at 08:42

## 2021-12-03 RX ADMIN — PROPOFOL 30 MG: 10 INJECTION, EMULSION INTRAVENOUS at 08:36

## 2021-12-03 RX ADMIN — PROPOFOL 30 MG: 10 INJECTION, EMULSION INTRAVENOUS at 08:47

## 2021-12-03 RX ADMIN — PROPOFOL 30 MG: 10 INJECTION, EMULSION INTRAVENOUS at 08:39

## 2021-12-03 RX ADMIN — PROPOFOL 30 MG: 10 INJECTION, EMULSION INTRAVENOUS at 08:44

## 2021-12-03 RX ADMIN — PROPOFOL 30 MG: 10 INJECTION, EMULSION INTRAVENOUS at 08:30

## 2021-12-03 ASSESSMENT — PULMONARY FUNCTION TESTS
PIF_VALUE: 0
PIF_VALUE: 1
PIF_VALUE: 0
PIF_VALUE: 1
PIF_VALUE: 1
PIF_VALUE: 0
PIF_VALUE: 1
PIF_VALUE: 0
PIF_VALUE: 1
PIF_VALUE: 0
PIF_VALUE: 1
PIF_VALUE: 0

## 2021-12-03 ASSESSMENT — PAIN - FUNCTIONAL ASSESSMENT: PAIN_FUNCTIONAL_ASSESSMENT: 0-10

## 2021-12-03 NOTE — ANESTHESIA PRE PROCEDURE
Department of Anesthesiology  Preprocedure Note       Name:  Janey Daley   Age:  66 y.o.  :  1950                                          MRN:  [id number]         Date:  12/3/2021      Surgeon: Marlene Palomares):  Bartolo Kirkpatrick MD    Procedure: Procedure(s):  COLONOSCOPY DIAGNOSTIC    Medications prior to admission:   Prior to Admission medications    Medication Sig Start Date End Date Taking?  Authorizing Provider   sodium bicarbonate 650 MG tablet TAKE 2 TABLETS BY MOUTH TWICE A DAY 21  Yes Daniel Klein MD   amLODIPine (NORVASC) 2.5 MG tablet TAKE 1 TABLET BY MOUTH EVERY DAY 21  Yes Historical Provider, MD   losartan (COZAAR) 25 MG tablet Take 1 tablet by mouth daily 10/5/21  Yes Daniel Klein MD   tiZANidine (ZANAFLEX) 4 MG tablet  21  Yes Historical Provider, MD   glimepiride (AMARYL) 4 MG tablet Take 4 mg by mouth 2 times daily   Yes Historical Provider, MD   metoprolol tartrate 75 MG TABS Take 75 mg by mouth 2 times daily 7/10/21  Yes Mt Narayan DO   allopurinol (ZYLOPRIM) 100 MG tablet TAKE 1 TABLET BY MOUTH EVERY DAY 20  Yes Historical Provider, MD   Probiotic Product (ACIDOPHILUS PROBIOTIC) CAPS capsule Take 1 capsule by mouth daily   Yes Historical Provider, MD   pravastatin (PRAVACHOL) 10 MG tablet Take 1 tablet by mouth nightly 19  Yes Mt Narayan DO   ELIQUIS 2.5 MG TABS tablet TAKE 1 TABLET BY MOUTH TWICE A DAY 10/1/21   Historical Provider, MD   ONETOUCH ULTRA strip USE AS DIRECTED TWICE DAILY 21   Historical Provider, MD BURK ULTRAFINE III SHORT PEN 31G X 8 MM MISC AS DIRECTED 1 TIME PER DAY 90 DAYS 21   Historical Provider, MD   insulin glargine (BASAGLAR KWIKPEN) 100 UNIT/ML injection pen Inject 35 Units into the skin nightly 7/10/21   Mt Narayan DO   Blood Glucose Monitoring Suppl (ONE TOUCH ULTRA 2) W/DEVICE KIT USE BID UTD 16   Historical Provider, MD       Current medications:    Current Facility-Administered Medications   Medication Dose Route Frequency Provider Last Rate Last Admin    0.9 % sodium chloride infusion   IntraVENous Continuous Valentínteresa Sales, DO        lactated ringers infusion   IntraVENous Continuous Mal Christine,  mL/hr at 12/03/21 0727 Restarted at 12/03/21 0810    sodium chloride flush 0.9 % injection 10 mL  10 mL IntraVENous 2 times per day Valentín W Sales, DO        sodium chloride flush 0.9 % injection 10 mL  10 mL IntraVENous PRN Mal Christine, DO        0.9 % sodium chloride infusion  25 mL IntraVENous PRN Mal Christine, DO        lidocaine PF 1 % injection 1 mL  1 mL IntraDERmal Once PRN Mal Christine, DO         Facility-Administered Medications Ordered in Other Encounters   Medication Dose Route Frequency Provider Last Rate Last Admin    lidocaine PF 2 % injection   IntraVENous PRN Jacki Garza, APRN - CRNA   100 mg at 12/03/21 0810    propofol injection   IntraVENous PRN Alexx Paredes, APRN - CRNA   30 mg at 12/03/21 5662       Allergies:     Allergies   Allergen Reactions    Iodine     Shellfish-Derived Products     Victoza [Liraglutide]      Abdominal pain       Problem List:    Patient Active Problem List   Diagnosis Code    HTN (hypertension) I10    Hypomagnesemia E83.42    CKD (chronic kidney disease) stage 3, GFR 30-59 ml/min ([facility]) N18.30    Vitamin D deficiency E55.9    Reactive airway disease that is not asthma J98.9    Type 2 diabetes mellitus, without long-term current use of insulin ([facility]) E11.9    Mixed hyperlipidemia E78.2    Nontoxic single thyroid nodule E04.1    Pneumonia J18.9    Abnormal CT scan, chest R93.89    Anemia, blood loss D50.0    Diarrhea R19.7    Nausea R11.0       Past Medical History:        Diagnosis Date    A-fib ([facility])     Anemia     Arrhythmia     Arthritis     Asthma     CKD (chronic kidney disease) stage 1, GFR 90 ml/min or greater     DM type 2 (diabetes mellitus, type 2) ([facility])     HTN (hypertension)     Hypomagnesemia        Past Surgical History:        Procedure Laterality Date    BUNIONECTOMY      CARPAL TUNNEL RELEASE Left 03/2018    CARPAL TUNNEL RELEASE Right 10/2018    CHOLECYSTECTOMY      COLONOSCOPY      FINGER TRIGGER RELEASE Right     ring finger    HERNIA REPAIR      HYSTERECTOMY      TONSILLECTOMY AND ADENOIDECTOMY      UPPER GASTROINTESTINAL ENDOSCOPY N/A 7/9/2021    EGD BIOPSY performed by Buck Ellsworth MD at [place] [address] History:    Social History     Tobacco Use    Smoking status: Never Smoker    Smokeless tobacco: Never Used   Substance Use Topics    Alcohol use: No                                Counseling given: Not Answered      Vital Signs (Current):   Vitals:    12/03/21 0702 12/03/21 0712   BP: (!) 147/64    Pulse: 89    Resp: 18    Temp: 96.9 °F (36.1 °C)    TempSrc: Temporal    SpO2: 99%    Weight:  217 lb (98.4 kg)   Height:  5' 1\" (1.549 m)                                              BP Readings from Last 3 Encounters:   12/03/21 (!) 147/64   10/05/21 (!) 142/70   08/05/21 120/72       NPO Status: Time of last liquid consumption: 2330                        Time of last solid consumption: 2000                        Date of last liquid consumption: 12/02/21                        Date of last solid food consumption: 12/01/21    BMI:   Wt Readings from Last 3 Encounters:   12/03/21 217 lb (98.4 kg)   10/05/21 220 lb 9.6 oz (100.1 kg)   08/05/21 224 lb 3.2 oz (101.7 kg)     Body mass index is 41 kg/m².     CBC:   Lab Results   Component Value Date    WBC 7.1 09/29/2021    RBC 4.30 09/29/2021    HGB 12.3 09/29/2021    HCT 38.8 09/29/2021    MCV 90.2 09/29/2021    RDW 13.6 09/29/2021    PLT See Reflexed IPF Result 09/29/2021       CMP:   Lab Results   Component Value Date     10/19/2021    K 4.5 10/19/2021     10/19/2021    CO2 18 10/19/2021    BUN 31 10/19/2021    CREATININE 1.36 10/19/2021    GFRAA 46 10/19/2021    LABGLOM 38 10/19/2021 GLUCOSE 239 10/19/2021    PROT 5.9 07/04/2021    CALCIUM 8.8 10/19/2021    BILITOT 0.38 07/03/2021    ALKPHOS 78 07/03/2021    AST 37 07/03/2021    ALT 30 07/03/2021       POC Tests:   Recent Labs     12/03/21  0724   POCGLU 106*       Coags:   Lab Results   Component Value Date    PROTIME 14.4 07/04/2021    INR 1.1 07/04/2021    APTT 31.0 07/03/2021       HCG (If Applicable): No results found for: PREGTESTUR, PREGSERUM, HCG, HCGQUANT     ABGs: No results found for: PHART, PO2ART, NTH5ZGK, QRZ9CJB, BEART, [id number]     Type & Screen (If Applicable):  No results found for: LABABO, LABRH    Drug/Infectious Status (If Applicable):  No results found for: HIV, HEPCAB    COVID-19 Screening (If Applicable): No results found for: COVID19        Anesthesia Evaluation  Patient summary reviewed and Nursing notes reviewed no history of anesthetic complications:   Airway: Mallampati: II  TM distance: >3 FB   Neck ROM: full  Mouth opening: > = 3 FB Dental: normal exam         Pulmonary:normal exam    (+) asthma:                            Cardiovascular:  Exercise tolerance: no interval change,   (+) hypertension:, dysrhythmias: atrial fibrillation,           Rate: normal                    Neuro/Psych:               GI/Hepatic/Renal:   (+) renal disease: CRI,           Endo/Other:    (+) Diabetes, : arthritis:., .                 Abdominal:             Vascular: Other Findings:             Anesthesia Plan      MAC and general     ASA 3       Induction: intravenous. MIPS: prophylactic pharmacologic antiemetic agents not administered perioperatively for documented reasons. Anesthetic plan and risks discussed with patient. Plan discussed with CRNA.     Attending anesthesiologist reviewed and agrees with Preprocedure content              Courtney Gonzalez DO   12/3/2021

## 2021-12-03 NOTE — OP NOTE
fair.      Findings:  Terminal ileum: normal    Cecum/Ascending colon: normal    Transverse colon: abnormal: Diverticulosis    Descending/Sigmoid colon: abnormal: 3 polyps in the sigmoid removed with snare the largest one was 1 cm    Diverticulosis    Redundant colon    Rectum/Anus: examined in normal and retroflexed positions and was abnormal: Minor hemorrhoids    Withdrawal Time was (minutes): 12    The colon was decompressed and the scope was removed. The patient tolerated the procedure well. Recommendations/Plan:   1. Lifestyle and dietary modifications as discussed  2. F/U Biopsies  3. F/U In OfficeYes  4. Discussed with the family  5.  Repeat colonoscopy [id number]    Electronically signed by Buck Ellsworth MD  on 12/3/2021 at 8:51 AM

## 2021-12-03 NOTE — H&P
TONSILLECTOMY AND ADENOIDECTOMY      UPPER GASTROINTESTINAL ENDOSCOPY N/A 7/9/2021    EGD BIOPSY performed by Andrew Templeton MD at [facility]        Medications Prior to Admission:     Prior to Admission medications    Medication Sig Start Date End Date Taking? Authorizing Provider   sodium bicarbonate 650 MG tablet TAKE 2 TABLETS BY MOUTH TWICE A DAY 11/24/21  Yes Millicent Rojas MD   amLODIPine (NORVASC) 2.5 MG tablet TAKE 1 TABLET BY MOUTH EVERY DAY 9/16/21  Yes Historical Provider, MD   losartan (COZAAR) 25 MG tablet Take 1 tablet by mouth daily 10/5/21  Yes Millicent Rojas MD   tiZANidine (ZANAFLEX) 4 MG tablet  7/23/21  Yes Historical Provider, MD   glimepiride (AMARYL) 4 MG tablet Take 4 mg by mouth 2 times daily   Yes Historical Provider, MD   metoprolol tartrate 75 MG TABS Take 75 mg by mouth 2 times daily 7/10/21  Yes Mt Narayan DO   allopurinol (ZYLOPRIM) 100 MG tablet TAKE 1 TABLET BY MOUTH EVERY DAY 7/6/20  Yes Historical Provider, MD   Probiotic Product (ACIDOPHILUS PROBIOTIC) CAPS capsule Take 1 capsule by mouth daily   Yes Historical Provider, MD   pravastatin (PRAVACHOL) 10 MG tablet Take 1 tablet by mouth nightly 6/21/19  Yes Mt Narayan DO   ELIQUIS 2.5 MG TABS tablet TAKE 1 TABLET BY MOUTH TWICE A DAY 10/1/21   Historical Provider, MD   ONETOUCH ULTRA strip USE AS DIRECTED TWICE DAILY 5/5/21   Historical Provider, MD BURK ULTRAFINE III SHORT PEN 31G X 8 MM MISC AS DIRECTED 1 TIME PER DAY 90 DAYS 5/14/21   Historical Provider, MD   insulin glargine (BASAGLAR KWIKPEN) 100 UNIT/ML injection pen Inject 35 Units into the skin nightly 7/10/21   Mt Narayan DO   Blood Glucose Monitoring Suppl (ONE TOUCH ULTRA 2) W/DEVICE KIT USE BID UTD 7/21/16   Historical Provider, MD        Allergies:     Iodine, Shellfish-derived products, and Victoza [liraglutide]    Social History:     Tobacco:    reports that she has never smoked.  She has never used smokeless tobacco.  Alcohol:      reports no history of alcohol use. Drug Use:  reports no history of drug use. Family History:     Family History   Problem Relation Age of Onset    Heart Disease Mother     Cancer Father         bladder       Review of Systems:     Positive and Negative as described in HPI. CONSTITUTIONAL: negative for fevers, chills, sweats, fatigue, weight loss  HEENT: negative for vision, hearing changes, runny nose, throat pain  RESPIRATORY: Asthma.  negative for shortness of breath, cough, congestion, wheezing. CARDIOVASCULAR: HTN. Atrial fibrillation. Occasional palpitations with atrial fibrillation. negative for chest pain  GASTROINTESTINAL: See HPI   GENITOURINARY: CKD. negative for difficulty of urination, burning with urination, frequency   INTEGUMENT: Easy bruising. negative for rash, skin lesions  HEMATOLOGIC/LYMPHATIC: negative for swelling/edema   ALLERGIC/IMMUNOLOGIC: negative for urticaria , itching  ENDOCRINE: Diabetes. negative increase in drinking, increase in urination, hot or cold intolerance  MUSCULOSKELETAL: Generalized joint pains. negative muscle aches, swelling of joints  NEUROLOGICAL: negative for headaches, dizziness, lightheadedness, numbness, pain, tingling extremities  BEHAVIOR/PSYCH: Depression and anxiety    Physical Exam:   BP (!) 147/64   Pulse 89   Temp 96.9 °F (36.1 °C) (Temporal)   Resp 18   Ht 5' 1\" (1.549 m)   Wt 217 lb (98.4 kg)   SpO2 99%   BMI 41.00 kg/m²   No LMP recorded. Patient has had a hysterectomy. No obstetric history on file. No results for input(s): POCGLU in the last 72 hours. General Appearance:  alert, well appearing, and in no acute distress Morbidly obese  Mental status: oriented to person, place, and time with normal affect  Head:  normocephalic, atraumatic.   Eye: no icterus, redness, pupils equal and reactive, extraocular eye movements intact, conjunctiva clear  Ear: normal external ear, no discharge, hearing intact  Nose:  no drainage noted  Mouth: mucous membranes moist  Neck: supple, no carotid bruits, thyroid not palpable  Lungs: Bilateral equal air entry, clear to ausculation, no wheezing, rales or rhonchi, normal effort  Cardiovascular: HR 89 irregular rate and irregular rhythm, no murmur, gallop, rub. Abdomen: Soft, nontender, nondistended, normal bowel sounds  Neurologic: There are no new focal motor or sensory deficits, normal muscle tone and bulk, no abnormal sensation, normal speech, cranial nerves II through XII grossly intact  Skin: No gross lesions, rashes, bruising or bleeding on exposed skin area  Extremities:  peripheral pulses palpable, no pedal edema or calf pain with palpation  Psych: normal affect     Investigations:      Laboratory Testing:  No results found for this or any previous visit (from the past 24 hour(s)). No results for input(s): HGB, HCT, WBC, MCV, PLATELET, NA, K, CL, CO2, BUN, CREATININE, GLUCOSE, INR, PROTIME, APTT, AST, ALT, LABALBU, HCG in the last 720 hours. No results for input(s): COVID19 in the last 720 hours. Imaging/Diagnostics:    No results found. Diagnosis:      1. Anemia    Plans:     1.  Diagnostic colonoscopy      GERONIMO Morocho - CNP  12/3/2021  7:32 AM

## 2021-12-06 LAB — SURGICAL PATHOLOGY REPORT: NORMAL

## 2022-01-12 ENCOUNTER — HOSPITAL ENCOUNTER (OUTPATIENT)
Age: 72
Setting detail: SPECIMEN
Discharge: HOME | End: 2022-01-12

## 2022-01-12 DIAGNOSIS — N18.30 SECONDARY DIABETES MELLITUS WITH STAGE 3 CHRONIC KIDNEY DISEASE (HCC): ICD-10-CM

## 2022-01-12 DIAGNOSIS — N18.32 STAGE 3B CHRONIC KIDNEY DISEASE (HCC): ICD-10-CM

## 2022-01-12 DIAGNOSIS — R80.9 MICROALBUMINURIA: ICD-10-CM

## 2022-01-12 DIAGNOSIS — E56.9 VITAMIN DEFICIENCY: ICD-10-CM

## 2022-01-12 DIAGNOSIS — E13.22 SECONDARY DIABETES MELLITUS WITH STAGE 3 CHRONIC KIDNEY DISEASE (HCC): ICD-10-CM

## 2022-01-12 DIAGNOSIS — N18.30 BENIGN HYPERTENSION WITH CKD (CHRONIC KIDNEY DISEASE) STAGE III (HCC): ICD-10-CM

## 2022-01-12 DIAGNOSIS — I12.9 BENIGN HYPERTENSION WITH CKD (CHRONIC KIDNEY DISEASE) STAGE III (HCC): ICD-10-CM

## 2022-01-12 LAB
-: NORMAL
ABSOLUTE EOS #: 0.19 K/UL (ref 0–0.44)
ABSOLUTE IMMATURE GRANULOCYTE: 0.03 K/UL (ref 0–0.3)
ABSOLUTE LYMPH #: 1.7 K/UL (ref 1.1–3.7)
ABSOLUTE MONO #: 0.7 K/UL (ref 0.1–1.2)
AMORPHOUS: NORMAL
ANION GAP SERPL CALCULATED.3IONS-SCNC: 17 MMOL/L (ref 9–17)
BACTERIA: NORMAL
BASOPHILS # BLD: 1 % (ref 0–2)
BASOPHILS ABSOLUTE: 0.05 K/UL (ref 0–0.2)
BILIRUBIN URINE: NEGATIVE
BUN BLDV-MCNC: 41 MG/DL (ref 8–23)
CALCIUM SERPL-MCNC: 9.2 MG/DL (ref 8.6–10.4)
CASTS UA: NORMAL /LPF (ref 0–8)
CHLORIDE BLD-SCNC: 103 MMOL/L (ref 98–107)
CO2: 19 MMOL/L (ref 20–31)
COLOR: YELLOW
CREAT SERPL-MCNC: 1.31 MG/DL (ref 0.5–0.9)
CREATININE URINE: 36.2 MG/DL (ref 28–217)
CRYSTALS, UA: NORMAL /HPF
DIFFERENTIAL TYPE: ABNORMAL
EOSINOPHILS RELATIVE PERCENT: 2 % (ref 1–4)
EPITHELIAL CELLS UA: NORMAL /HPF (ref 0–5)
GFR AFRICAN AMERICAN: 48 ML/MIN
GFR NON-AFRICAN AMERICAN: 40 ML/MIN
GFR SERPL CREATININE-BSD FRML MDRD: ABNORMAL ML/MIN/{1.73_M2}
GFR SERPL CREATININE-BSD FRML MDRD: ABNORMAL ML/MIN/{1.73_M2}
GLUCOSE URINE: NEGATIVE
HCT VFR BLD CALC: 44 % (ref 36.3–47.1)
HEMOGLOBIN: 14 G/DL (ref 11.9–15.1)
IMMATURE GRANULOCYTES: 0 %
KETONES, URINE: NEGATIVE
LEUKOCYTE ESTERASE, URINE: NEGATIVE
LYMPHOCYTES # BLD: 20 % (ref 24–43)
MAGNESIUM: 2.1 MG/DL (ref 1.6–2.6)
MCH RBC QN AUTO: 29.4 PG (ref 25.2–33.5)
MCHC RBC AUTO-ENTMCNC: 31.8 G/DL (ref 28.4–34.8)
MCV RBC AUTO: 92.4 FL (ref 82.6–102.9)
MICROALBUMIN/CREAT 24H UR: 64 MG/L
MICROALBUMIN/CREAT UR-RTO: 177 MCG/MG CREAT
MONOCYTES # BLD: 8 % (ref 3–12)
MUCUS: NORMAL
NITRITE, URINE: NEGATIVE
NRBC AUTOMATED: 0 PER 100 WBC
OTHER OBSERVATIONS UA: NORMAL
PDW BLD-RTO: 14.5 % (ref 11.8–14.4)
PH UA: 5.5 (ref 5–8)
PHOSPHORUS: 4.7 MG/DL (ref 2.6–4.5)
PLATELET # BLD: 191 K/UL (ref 138–453)
PLATELET ESTIMATE: ABNORMAL
PMV BLD AUTO: 13.5 FL (ref 8.1–13.5)
POTASSIUM SERPL-SCNC: 4.8 MMOL/L (ref 3.7–5.3)
PROTEIN UA: NEGATIVE
RBC # BLD: 4.76 M/UL (ref 3.95–5.11)
RBC # BLD: ABNORMAL 10*6/UL
RBC UA: NORMAL /HPF (ref 0–4)
RENAL EPITHELIAL, UA: NORMAL /HPF
SEG NEUTROPHILS: 69 % (ref 36–65)
SEGMENTED NEUTROPHILS ABSOLUTE COUNT: 6.05 K/UL (ref 1.5–8.1)
SODIUM BLD-SCNC: 139 MMOL/L (ref 135–144)
SPECIFIC GRAVITY UA: 1.01 (ref 1–1.03)
TRICHOMONAS: NORMAL
TURBIDITY: CLEAR
URINE HGB: NEGATIVE
UROBILINOGEN, URINE: NORMAL
VITAMIN D 25-HYDROXY: 34.1 NG/ML (ref 30–100)
WBC # BLD: 8.7 K/UL (ref 3.5–11.3)
WBC # BLD: ABNORMAL 10*3/UL
WBC UA: NORMAL /HPF (ref 0–5)
YEAST: NORMAL

## 2022-04-12 ENCOUNTER — HOSPITAL ENCOUNTER (OUTPATIENT)
Age: 72
Setting detail: SPECIMEN
Discharge: HOME | End: 2022-04-12

## 2022-04-12 DIAGNOSIS — N18.30 BENIGN HYPERTENSION WITH CKD (CHRONIC KIDNEY DISEASE) STAGE III (HCC): ICD-10-CM

## 2022-04-12 DIAGNOSIS — N18.30 SECONDARY DIABETES MELLITUS WITH STAGE 3 CHRONIC KIDNEY DISEASE (HCC): ICD-10-CM

## 2022-04-12 DIAGNOSIS — I12.9 BENIGN HYPERTENSION WITH CKD (CHRONIC KIDNEY DISEASE) STAGE III (HCC): ICD-10-CM

## 2022-04-12 DIAGNOSIS — E83.39 HYPERPHOSPHATEMIA: ICD-10-CM

## 2022-04-12 DIAGNOSIS — R80.9 MICROALBUMINURIA: ICD-10-CM

## 2022-04-12 DIAGNOSIS — N18.32 STAGE 3B CHRONIC KIDNEY DISEASE (HCC): ICD-10-CM

## 2022-04-12 DIAGNOSIS — E13.22 SECONDARY DIABETES MELLITUS WITH STAGE 3 CHRONIC KIDNEY DISEASE (HCC): ICD-10-CM

## 2022-04-12 LAB
-: ABNORMAL
ABSOLUTE EOS #: 0.18 K/UL (ref 0–0.44)
ABSOLUTE IMMATURE GRANULOCYTE: <0.03 K/UL (ref 0–0.3)
ABSOLUTE LYMPH #: 1.82 K/UL (ref 1.1–3.7)
ABSOLUTE MONO #: 0.61 K/UL (ref 0.1–1.2)
ANION GAP SERPL CALCULATED.3IONS-SCNC: 13 MMOL/L (ref 9–17)
BASOPHILS # BLD: 1 % (ref 0–2)
BASOPHILS ABSOLUTE: 0.06 K/UL (ref 0–0.2)
BILIRUBIN URINE: NEGATIVE
BUN BLDV-MCNC: 37 MG/DL (ref 8–23)
CALCIUM SERPL-MCNC: 9.3 MG/DL (ref 8.6–10.4)
CHLORIDE BLD-SCNC: 106 MMOL/L (ref 98–107)
CO2: 22 MMOL/L (ref 20–31)
COLOR: YELLOW
CREAT SERPL-MCNC: 1.44 MG/DL (ref 0.5–0.9)
CREATININE URINE: 54.8 MG/DL (ref 28–217)
EOSINOPHILS RELATIVE PERCENT: 3 % (ref 1–4)
EPITHELIAL CELLS UA: ABNORMAL /HPF (ref 0–5)
GFR AFRICAN AMERICAN: 43 ML/MIN
GFR NON-AFRICAN AMERICAN: 36 ML/MIN
GFR SERPL CREATININE-BSD FRML MDRD: ABNORMAL ML/MIN/{1.73_M2}
GLUCOSE URINE: NEGATIVE
HCT VFR BLD CALC: 42.8 % (ref 36.3–47.1)
HEMOGLOBIN: 14.1 G/DL (ref 11.9–15.1)
IMMATURE GRANULOCYTES: 0 %
KETONES, URINE: NEGATIVE
LEUKOCYTE ESTERASE, URINE: NEGATIVE
LYMPHOCYTES # BLD: 26 % (ref 24–43)
MAGNESIUM: 2 MG/DL (ref 1.6–2.6)
MCH RBC QN AUTO: 30.3 PG (ref 25.2–33.5)
MCHC RBC AUTO-ENTMCNC: 32.9 G/DL (ref 28.4–34.8)
MCV RBC AUTO: 92 FL (ref 82.6–102.9)
MICROALBUMIN/CREAT 24H UR: 77 MG/L
MICROALBUMIN/CREAT UR-RTO: 141 MCG/MG CREAT
MONOCYTES # BLD: 9 % (ref 3–12)
NITRITE, URINE: NEGATIVE
NRBC AUTOMATED: 0 PER 100 WBC
PDW BLD-RTO: 12.9 % (ref 11.8–14.4)
PH UA: 6 (ref 5–8)
PHOSPHORUS: 4.2 MG/DL (ref 2.6–4.5)
PLATELET # BLD: NORMAL K/UL (ref 138–453)
PLATELET, FLUORESCENCE: 174 K/UL (ref 138–453)
PLATELET, IMMATURE FRACTION: 7 % (ref 1.1–10.3)
POTASSIUM SERPL-SCNC: 4.3 MMOL/L (ref 3.7–5.3)
PROTEIN UA: ABNORMAL
RBC # BLD: 4.65 M/UL (ref 3.95–5.11)
RBC UA: ABNORMAL /HPF (ref 0–4)
SEG NEUTROPHILS: 62 % (ref 36–65)
SEGMENTED NEUTROPHILS ABSOLUTE COUNT: 4.42 K/UL (ref 1.5–8.1)
SODIUM BLD-SCNC: 141 MMOL/L (ref 135–144)
SPECIFIC GRAVITY UA: 1.01 (ref 1–1.03)
TURBIDITY: CLEAR
URINE HGB: NEGATIVE
UROBILINOGEN, URINE: NORMAL
VITAMIN D 25-HYDROXY: 31.4 NG/ML
WBC # BLD: 7.1 K/UL (ref 3.5–11.3)
WBC UA: ABNORMAL /HPF (ref 0–5)

## 2022-08-16 ENCOUNTER — HOSPITAL ENCOUNTER (OUTPATIENT)
Age: 72
Setting detail: SPECIMEN
Discharge: HOME | End: 2022-08-16

## 2022-08-16 DIAGNOSIS — R80.9 MICROALBUMINURIA: ICD-10-CM

## 2022-08-16 DIAGNOSIS — N18.30 BENIGN HYPERTENSION WITH CKD (CHRONIC KIDNEY DISEASE) STAGE III (HCC): ICD-10-CM

## 2022-08-16 DIAGNOSIS — N18.30 SECONDARY DIABETES MELLITUS WITH STAGE 3 CHRONIC KIDNEY DISEASE (HCC): ICD-10-CM

## 2022-08-16 DIAGNOSIS — I12.9 BENIGN HYPERTENSION WITH CKD (CHRONIC KIDNEY DISEASE) STAGE III (HCC): ICD-10-CM

## 2022-08-16 DIAGNOSIS — E13.22 SECONDARY DIABETES MELLITUS WITH STAGE 3 CHRONIC KIDNEY DISEASE (HCC): ICD-10-CM

## 2022-08-16 DIAGNOSIS — N18.32 STAGE 3B CHRONIC KIDNEY DISEASE (HCC): ICD-10-CM

## 2022-08-16 LAB
ABSOLUTE EOS #: 0.21 K/UL (ref 0–0.44)
ABSOLUTE IMMATURE GRANULOCYTE: <0.03 K/UL (ref 0–0.3)
ABSOLUTE LYMPH #: 1.49 K/UL (ref 1.1–3.7)
ABSOLUTE MONO #: 0.65 K/UL (ref 0.1–1.2)
ANION GAP SERPL CALCULATED.3IONS-SCNC: 13 MMOL/L (ref 9–17)
BASOPHILS # BLD: 1 % (ref 0–2)
BASOPHILS ABSOLUTE: 0.04 K/UL (ref 0–0.2)
BILIRUBIN URINE: NEGATIVE
BUN BLDV-MCNC: 36 MG/DL (ref 8–23)
CALCIUM SERPL-MCNC: 8.9 MG/DL (ref 8.6–10.4)
CHLORIDE BLD-SCNC: 104 MMOL/L (ref 98–107)
CO2: 23 MMOL/L (ref 20–31)
COLOR: YELLOW
CREAT SERPL-MCNC: 1.46 MG/DL (ref 0.5–0.9)
CREATININE URINE: 25.2 MG/DL (ref 28–217)
EOSINOPHILS RELATIVE PERCENT: 3 % (ref 1–4)
EPITHELIAL CELLS UA: NORMAL /HPF (ref 0–5)
GFR AFRICAN AMERICAN: 43 ML/MIN
GFR NON-AFRICAN AMERICAN: 35 ML/MIN
GFR SERPL CREATININE-BSD FRML MDRD: ABNORMAL ML/MIN/{1.73_M2}
GLUCOSE URINE: NEGATIVE
HCT VFR BLD CALC: 40.4 % (ref 36.3–47.1)
HEMOGLOBIN: 13.9 G/DL (ref 11.9–15.1)
IMMATURE GRANULOCYTES: 0 %
KETONES, URINE: NEGATIVE
LEUKOCYTE ESTERASE, URINE: NEGATIVE
LYMPHOCYTES # BLD: 19 % (ref 24–43)
MAGNESIUM: 1.9 MG/DL (ref 1.6–2.6)
MCH RBC QN AUTO: 32.4 PG (ref 25.2–33.5)
MCHC RBC AUTO-ENTMCNC: 34.4 G/DL (ref 28.4–34.8)
MCV RBC AUTO: 94.2 FL (ref 82.6–102.9)
MICROALBUMIN/CREAT 24H UR: <12 MG/L
MICROALBUMIN/CREAT UR-RTO: ABNORMAL MCG/MG CREAT
MONOCYTES # BLD: 8 % (ref 3–12)
NITRITE, URINE: NEGATIVE
NRBC AUTOMATED: 0 PER 100 WBC
PDW BLD-RTO: 12.8 % (ref 11.8–14.4)
PH UA: 6.5 (ref 5–8)
PHOSPHORUS: 4.4 MG/DL (ref 2.6–4.5)
PLATELET # BLD: ABNORMAL K/UL (ref 138–453)
PLATELET, FLUORESCENCE: 197 K/UL (ref 138–453)
PLATELET, IMMATURE FRACTION: 8 % (ref 1.1–10.3)
POTASSIUM SERPL-SCNC: 4.6 MMOL/L (ref 3.7–5.3)
PROTEIN UA: NEGATIVE
RBC # BLD: 4.29 M/UL (ref 3.95–5.11)
RBC UA: NORMAL /HPF (ref 0–4)
SEG NEUTROPHILS: 69 % (ref 36–65)
SEGMENTED NEUTROPHILS ABSOLUTE COUNT: 5.32 K/UL (ref 1.5–8.1)
SODIUM BLD-SCNC: 140 MMOL/L (ref 135–144)
SPECIFIC GRAVITY UA: 1.01 (ref 1–1.03)
TURBIDITY: CLEAR
URINE HGB: NEGATIVE
UROBILINOGEN, URINE: NORMAL
VITAMIN D 25-HYDROXY: 24.4 NG/ML
WBC # BLD: 7.7 K/UL (ref 3.5–11.3)
WBC UA: NORMAL /HPF (ref 0–5)

## 2023-02-14 ENCOUNTER — HOSPITAL ENCOUNTER (OUTPATIENT)
Age: 73
Setting detail: SPECIMEN
Discharge: HOME | End: 2023-02-14

## 2023-02-14 DIAGNOSIS — N18.30 BENIGN HYPERTENSION WITH CKD (CHRONIC KIDNEY DISEASE) STAGE III (HCC): ICD-10-CM

## 2023-02-14 DIAGNOSIS — N18.30 SECONDARY DIABETES MELLITUS WITH STAGE 3 CHRONIC KIDNEY DISEASE (HCC): ICD-10-CM

## 2023-02-14 DIAGNOSIS — N18.32 STAGE 3B CHRONIC KIDNEY DISEASE (HCC): ICD-10-CM

## 2023-02-14 DIAGNOSIS — I12.9 BENIGN HYPERTENSION WITH CKD (CHRONIC KIDNEY DISEASE) STAGE III (HCC): ICD-10-CM

## 2023-02-14 DIAGNOSIS — E13.22 SECONDARY DIABETES MELLITUS WITH STAGE 3 CHRONIC KIDNEY DISEASE (HCC): ICD-10-CM

## 2023-02-14 LAB
25(OH)D3 SERPL-MCNC: 44.9 NG/ML
ABSOLUTE EOS #: 0.22 K/UL (ref 0–0.44)
ABSOLUTE IMMATURE GRANULOCYTE: 0.03 K/UL (ref 0–0.3)
ABSOLUTE LYMPH #: 1.53 K/UL (ref 1.1–3.7)
ABSOLUTE MONO #: 0.67 K/UL (ref 0.1–1.2)
ANION GAP SERPL CALCULATED.3IONS-SCNC: 21 MMOL/L (ref 9–17)
BASOPHILS # BLD: 1 % (ref 0–2)
BASOPHILS ABSOLUTE: 0.07 K/UL (ref 0–0.2)
BILIRUBIN URINE: NEGATIVE
BUN SERPL-MCNC: 29 MG/DL (ref 8–23)
CALCIUM SERPL-MCNC: 9.6 MG/DL (ref 8.6–10.4)
CASTS UA: ABNORMAL /LPF (ref 0–8)
CHLORIDE SERPL-SCNC: 107 MMOL/L (ref 98–107)
CHOLEST SERPL-MCNC: 178 MG/DL
CHOLESTEROL/HDL RATIO: 2.7
CO2 SERPL-SCNC: 19 MMOL/L (ref 20–31)
COLOR: YELLOW
CREAT SERPL-MCNC: 1.25 MG/DL (ref 0.5–0.9)
EOSINOPHILS RELATIVE PERCENT: 3 % (ref 1–4)
EPITHELIAL CELLS UA: ABNORMAL /HPF (ref 0–5)
GFR SERPL CREATININE-BSD FRML MDRD: 46 ML/MIN/1.73M2
GLUCOSE UR STRIP.AUTO-MCNC: NEGATIVE MG/DL
HCT VFR BLD AUTO: 44.2 % (ref 36.3–47.1)
HDLC SERPL-MCNC: 65 MG/DL
HGB BLD-MCNC: 14.2 G/DL (ref 11.9–15.1)
IMMATURE GRANULOCYTES: 0 %
KETONES UR STRIP.AUTO-MCNC: NEGATIVE MG/DL
LDLC SERPL CALC-MCNC: 102 MG/DL (ref 0–130)
LEUKOCYTE ESTERASE UR QL STRIP.AUTO: NEGATIVE
LYMPHOCYTES # BLD: 18 % (ref 24–43)
MAGNESIUM SERPL-MCNC: 1.9 MG/DL (ref 1.6–2.6)
MCH RBC QN AUTO: 31.1 PG (ref 25.2–33.5)
MCHC RBC AUTO-ENTMCNC: 32.1 G/DL (ref 28.4–34.8)
MCV RBC AUTO: 96.9 FL (ref 82.6–102.9)
MONOCYTES # BLD: 8 % (ref 3–12)
NITRITE UR QL STRIP.AUTO: NEGATIVE
NRBC AUTOMATED: 0 PER 100 WBC
PDW BLD-RTO: 13.3 % (ref 11.8–14.4)
PHOSPHATE SERPL-MCNC: 4 MG/DL (ref 2.6–4.5)
PLATELET # BLD AUTO: 191 K/UL (ref 138–453)
PMV BLD AUTO: 12.8 FL (ref 8.1–13.5)
POTASSIUM SERPL-SCNC: 4.1 MMOL/L (ref 3.7–5.3)
PROT UR STRIP.AUTO-MCNC: 5.5 MG/DL (ref 5–8)
PROT UR STRIP.AUTO-MCNC: ABNORMAL MG/DL
RBC # BLD: 4.56 M/UL (ref 3.95–5.11)
RBC CLUMPS #/AREA URNS AUTO: ABNORMAL /HPF (ref 0–4)
SEG NEUTROPHILS: 70 % (ref 36–65)
SEGMENTED NEUTROPHILS ABSOLUTE COUNT: 6.04 K/UL (ref 1.5–8.1)
SODIUM SERPL-SCNC: 147 MMOL/L (ref 135–144)
SPECIFIC GRAVITY UA: 1.01 (ref 1–1.03)
TRIGL SERPL-MCNC: 56 MG/DL
TURBIDITY: CLEAR
URINE HGB: NEGATIVE
UROBILINOGEN, URINE: NORMAL
WBC # BLD AUTO: 8.6 K/UL (ref 3.5–11.3)
WBC UA: ABNORMAL /HPF (ref 0–5)

## 2023-02-20 ENCOUNTER — HOSPITAL ENCOUNTER (OUTPATIENT)
Age: 73
Setting detail: SPECIMEN
Discharge: HOME | End: 2023-02-20
Payer: MEDICARE

## 2023-02-20 DIAGNOSIS — E56.9 VITAMIN DEFICIENCY: ICD-10-CM

## 2023-02-20 DIAGNOSIS — R39.9 UTI SYMPTOMS: ICD-10-CM

## 2023-02-20 DIAGNOSIS — N18.32 STAGE 3B CHRONIC KIDNEY DISEASE (HCC): ICD-10-CM

## 2023-02-20 DIAGNOSIS — I12.9 BENIGN HYPERTENSION WITH CKD (CHRONIC KIDNEY DISEASE) STAGE III (HCC): ICD-10-CM

## 2023-02-20 DIAGNOSIS — N18.30 SECONDARY DIABETES MELLITUS WITH STAGE 3 CHRONIC KIDNEY DISEASE (HCC): ICD-10-CM

## 2023-02-20 DIAGNOSIS — E13.22 SECONDARY DIABETES MELLITUS WITH STAGE 3 CHRONIC KIDNEY DISEASE (HCC): ICD-10-CM

## 2023-02-20 DIAGNOSIS — N18.30 BENIGN HYPERTENSION WITH CKD (CHRONIC KIDNEY DISEASE) STAGE III (HCC): ICD-10-CM

## 2023-02-20 LAB
CREATININE URINE: 67.6 MG/DL (ref 28–217)
MICROALBUMIN/CREAT 24H UR: 64 MG/L
MICROALBUMIN/CREAT UR-RTO: 95 MCG/MG CREAT

## 2023-02-20 PROCEDURE — 87086 URINE CULTURE/COLONY COUNT: CPT

## 2023-02-20 PROCEDURE — 82043 UR ALBUMIN QUANTITATIVE: CPT

## 2023-02-20 PROCEDURE — 36415 COLL VENOUS BLD VENIPUNCTURE: CPT

## 2023-02-20 PROCEDURE — 82570 ASSAY OF URINE CREATININE: CPT

## 2023-02-21 LAB
MICROORGANISM SPEC CULT: NORMAL
SPECIMEN DESCRIPTION: NORMAL

## 2023-03-20 ENCOUNTER — HOSPITAL ENCOUNTER (OUTPATIENT)
Age: 73
Setting detail: SPECIMEN
Discharge: HOME | End: 2023-03-20

## 2023-03-20 DIAGNOSIS — E13.22 SECONDARY DIABETES MELLITUS WITH STAGE 3 CHRONIC KIDNEY DISEASE (HCC): ICD-10-CM

## 2023-03-20 DIAGNOSIS — R39.9 UTI SYMPTOMS: ICD-10-CM

## 2023-03-20 DIAGNOSIS — N18.30 BENIGN HYPERTENSION WITH CKD (CHRONIC KIDNEY DISEASE) STAGE III (HCC): ICD-10-CM

## 2023-03-20 DIAGNOSIS — I12.9 BENIGN HYPERTENSION WITH CKD (CHRONIC KIDNEY DISEASE) STAGE III (HCC): ICD-10-CM

## 2023-03-20 DIAGNOSIS — N18.30 SECONDARY DIABETES MELLITUS WITH STAGE 3 CHRONIC KIDNEY DISEASE (HCC): ICD-10-CM

## 2023-03-20 DIAGNOSIS — E56.9 VITAMIN DEFICIENCY: ICD-10-CM

## 2023-03-20 DIAGNOSIS — N18.32 STAGE 3B CHRONIC KIDNEY DISEASE (HCC): ICD-10-CM

## 2023-03-20 LAB
ANION GAP SERPL CALCULATED.3IONS-SCNC: 14 MMOL/L (ref 9–17)
BUN SERPL-MCNC: 35 MG/DL (ref 8–23)
CALCIUM SERPL-MCNC: 9.2 MG/DL (ref 8.6–10.4)
CHLORIDE SERPL-SCNC: 100 MMOL/L (ref 98–107)
CO2 SERPL-SCNC: 22 MMOL/L (ref 20–31)
CREAT SERPL-MCNC: 1.42 MG/DL (ref 0.5–0.9)
GFR SERPL CREATININE-BSD FRML MDRD: 39 ML/MIN/1.73M2
GLUCOSE SERPL-MCNC: 360 MG/DL (ref 70–99)
POTASSIUM SERPL-SCNC: 4.4 MMOL/L (ref 3.7–5.3)
SODIUM SERPL-SCNC: 136 MMOL/L (ref 135–144)

## 2023-05-22 ENCOUNTER — HOSPITAL ENCOUNTER (OUTPATIENT)
Age: 73
Setting detail: SPECIMEN
Discharge: HOME | End: 2023-05-22

## 2023-05-22 DIAGNOSIS — E13.22 SECONDARY DIABETES MELLITUS WITH STAGE 3 CHRONIC KIDNEY DISEASE (HCC): ICD-10-CM

## 2023-05-22 DIAGNOSIS — N18.30 SECONDARY DIABETES MELLITUS WITH STAGE 3 CHRONIC KIDNEY DISEASE (HCC): ICD-10-CM

## 2023-05-22 DIAGNOSIS — I12.9 BENIGN HYPERTENSION WITH CKD (CHRONIC KIDNEY DISEASE) STAGE III (HCC): ICD-10-CM

## 2023-05-22 DIAGNOSIS — N18.30 BENIGN HYPERTENSION WITH CKD (CHRONIC KIDNEY DISEASE) STAGE III (HCC): ICD-10-CM

## 2023-05-22 DIAGNOSIS — E56.9 VITAMIN DEFICIENCY: ICD-10-CM

## 2023-05-22 DIAGNOSIS — N18.32 STAGE 3B CHRONIC KIDNEY DISEASE (HCC): ICD-10-CM

## 2023-05-22 LAB
25(OH)D3 SERPL-MCNC: 46.9 NG/ML
ANION GAP SERPL CALCULATED.3IONS-SCNC: 17 MMOL/L (ref 9–17)
BILIRUB UR QL STRIP: NEGATIVE
BUN SERPL-MCNC: 35 MG/DL (ref 8–23)
CALCIUM SERPL-MCNC: 9.4 MG/DL (ref 8.6–10.4)
CHLORIDE SERPL-SCNC: 104 MMOL/L (ref 98–107)
CLARITY UR: CLEAR
CO2 SERPL-SCNC: 20 MMOL/L (ref 20–31)
COLOR UR: YELLOW
CREAT SERPL-MCNC: 1.34 MG/DL (ref 0.5–0.9)
CREAT UR-MCNC: 77.3 MG/DL (ref 28–217)
EPI CELLS #/AREA URNS HPF: ABNORMAL /HPF (ref 0–5)
GFR SERPL CREATININE-BSD FRML MDRD: 42 ML/MIN/1.73M2
GLUCOSE UR STRIP-MCNC: NEGATIVE MG/DL
HCT VFR BLD AUTO: 42.2 % (ref 36.3–47.1)
HGB BLD-MCNC: 13.9 G/DL (ref 11.9–15.1)
HGB UR QL STRIP.AUTO: NEGATIVE
KETONES UR STRIP-MCNC: NEGATIVE MG/DL
LEUKOCYTE ESTERASE UR QL STRIP: ABNORMAL
MAGNESIUM SERPL-MCNC: 2.2 MG/DL (ref 1.6–2.6)
MICROALBUMIN UR-MCNC: 36 MG/L
MICROALBUMIN/CREAT UR-RTO: 47 MCG/MG CREAT
NITRITE UR QL STRIP: NEGATIVE
PH UR STRIP: 6 [PH] (ref 5–8)
PHOSPHATE SERPL-MCNC: 4.4 MG/DL (ref 2.6–4.5)
POTASSIUM SERPL-SCNC: 4.8 MMOL/L (ref 3.7–5.3)
PROT UR STRIP-MCNC: NEGATIVE MG/DL
RBC #/AREA URNS HPF: ABNORMAL /HPF (ref 0–4)
SODIUM SERPL-SCNC: 141 MMOL/L (ref 135–144)
SP GR UR STRIP: 1.01 (ref 1–1.03)
UROBILINOGEN UR STRIP-ACNC: NORMAL
WBC #/AREA URNS HPF: ABNORMAL /HPF (ref 0–5)

## 2023-09-14 ENCOUNTER — HOSPITAL ENCOUNTER (OUTPATIENT)
Age: 73
Setting detail: SPECIMEN
Discharge: HOME | End: 2023-09-14

## 2023-09-14 DIAGNOSIS — I12.9 BENIGN HYPERTENSION WITH CKD (CHRONIC KIDNEY DISEASE) STAGE III (HCC): ICD-10-CM

## 2023-09-14 DIAGNOSIS — E56.9 VITAMIN DEFICIENCY: ICD-10-CM

## 2023-09-14 DIAGNOSIS — R80.9 MICROALBUMINURIA: ICD-10-CM

## 2023-09-14 DIAGNOSIS — N18.32 STAGE 3B CHRONIC KIDNEY DISEASE (HCC): ICD-10-CM

## 2023-09-14 DIAGNOSIS — N18.30 BENIGN HYPERTENSION WITH CKD (CHRONIC KIDNEY DISEASE) STAGE III (HCC): ICD-10-CM

## 2023-09-14 DIAGNOSIS — E13.22 SECONDARY DIABETES MELLITUS WITH STAGE 3 CHRONIC KIDNEY DISEASE (HCC): ICD-10-CM

## 2023-09-14 DIAGNOSIS — N18.30 SECONDARY DIABETES MELLITUS WITH STAGE 3 CHRONIC KIDNEY DISEASE (HCC): ICD-10-CM

## 2023-09-14 LAB
BILIRUB UR QL STRIP: NEGATIVE
CASTS #/AREA URNS LPF: NORMAL /LPF (ref 0–8)
CLARITY UR: CLEAR
COLOR UR: YELLOW
EPI CELLS #/AREA URNS HPF: NORMAL /HPF (ref 0–5)
GLUCOSE UR STRIP-MCNC: NEGATIVE MG/DL
HCT VFR BLD AUTO: 43.6 % (ref 36.3–47.1)
HGB BLD-MCNC: 14.4 G/DL (ref 11.9–15.1)
HGB UR QL STRIP.AUTO: NEGATIVE
KETONES UR STRIP-MCNC: NEGATIVE MG/DL
LEUKOCYTE ESTERASE UR QL STRIP: NEGATIVE
NITRITE UR QL STRIP: NEGATIVE
PH UR STRIP: 6.5 [PH] (ref 5–8)
PROT UR STRIP-MCNC: NEGATIVE MG/DL
RBC #/AREA URNS HPF: NORMAL /HPF (ref 0–4)
SP GR UR STRIP: 1.01 (ref 1–1.03)
UROBILINOGEN UR STRIP-ACNC: NORMAL EU/DL (ref 0–1)
WBC #/AREA URNS HPF: NORMAL /HPF (ref 0–5)

## 2023-09-15 LAB
25(OH)D3 SERPL-MCNC: 43.4 NG/ML
ANION GAP SERPL CALCULATED.3IONS-SCNC: 15 MMOL/L (ref 9–17)
BUN SERPL-MCNC: 35 MG/DL (ref 8–23)
CALCIUM SERPL-MCNC: 9.5 MG/DL (ref 8.6–10.4)
CHLORIDE SERPL-SCNC: 102 MMOL/L (ref 98–107)
CO2 SERPL-SCNC: 20 MMOL/L (ref 20–31)
CREAT SERPL-MCNC: 1.3 MG/DL (ref 0.5–0.9)
CREAT UR-MCNC: 38.3 MG/DL (ref 28–217)
GFR SERPL CREATININE-BSD FRML MDRD: 43 ML/MIN/1.73M2
MAGNESIUM SERPL-MCNC: 2 MG/DL (ref 1.6–2.6)
MICROALBUMIN UR-MCNC: 19 MG/L (ref 0–20)
MICROALBUMIN/CREAT UR-RTO: 50 MCG/MG CREAT (ref 0–25)
PHOSPHATE SERPL-MCNC: 4.1 MG/DL (ref 2.6–4.5)
POTASSIUM SERPL-SCNC: 4.6 MMOL/L (ref 3.7–5.3)
SODIUM SERPL-SCNC: 137 MMOL/L (ref 135–144)

## 2023-09-20 PROBLEM — H26.492 OTHER SECONDARY CATARACT, LEFT EYE: Status: ACTIVE | Noted: 2023-09-20

## 2024-04-19 ENCOUNTER — HOSPITAL ENCOUNTER (OUTPATIENT)
Age: 74
Setting detail: SPECIMEN
Discharge: HOME | End: 2024-04-19

## 2024-04-19 DIAGNOSIS — Z13.21 ENCOUNTER FOR VITAMIN DEFICIENCY SCREENING: ICD-10-CM

## 2024-04-19 DIAGNOSIS — E13.22 SECONDARY DIABETES MELLITUS WITH STAGE 3 CHRONIC KIDNEY DISEASE (HCC): ICD-10-CM

## 2024-04-19 DIAGNOSIS — I12.9 BENIGN HYPERTENSION WITH CKD (CHRONIC KIDNEY DISEASE) STAGE III (HCC): ICD-10-CM

## 2024-04-19 DIAGNOSIS — N18.30 BENIGN HYPERTENSION WITH CKD (CHRONIC KIDNEY DISEASE) STAGE III (HCC): ICD-10-CM

## 2024-04-19 DIAGNOSIS — N18.30 SECONDARY DIABETES MELLITUS WITH STAGE 3 CHRONIC KIDNEY DISEASE (HCC): ICD-10-CM

## 2024-04-19 DIAGNOSIS — N18.32 STAGE 3B CHRONIC KIDNEY DISEASE (HCC): ICD-10-CM

## 2024-04-19 LAB
25(OH)D3 SERPL-MCNC: 53.7 NG/ML (ref 30–100)
ANION GAP SERPL CALCULATED.3IONS-SCNC: 15 MMOL/L (ref 9–16)
BACTERIA URNS QL MICRO: NORMAL
BILIRUB UR QL STRIP: NEGATIVE
BUN SERPL-MCNC: 42 MG/DL (ref 8–23)
CALCIUM SERPL-MCNC: 9.6 MG/DL (ref 8.6–10.4)
CASTS #/AREA URNS LPF: NORMAL /LPF (ref 0–8)
CHLORIDE SERPL-SCNC: 105 MMOL/L (ref 98–107)
CHOLEST SERPL-MCNC: 164 MG/DL (ref 0–199)
CHOLESTEROL/HDL RATIO: 3
CLARITY UR: CLEAR
CO2 SERPL-SCNC: 24 MMOL/L (ref 20–31)
COLOR UR: YELLOW
CREAT SERPL-MCNC: 1.4 MG/DL (ref 0.5–0.9)
EPI CELLS #/AREA URNS HPF: NORMAL /HPF (ref 0–5)
GFR SERPL CREATININE-BSD FRML MDRD: 41 ML/MIN/1.73M2
GLUCOSE UR STRIP-MCNC: NEGATIVE MG/DL
HCT VFR BLD AUTO: 43.4 % (ref 36.3–47.1)
HDLC SERPL-MCNC: 63 MG/DL
HGB BLD-MCNC: 14.5 G/DL (ref 11.9–15.1)
HGB UR QL STRIP.AUTO: NEGATIVE
KETONES UR STRIP-MCNC: NEGATIVE MG/DL
LDLC SERPL CALC-MCNC: 89 MG/DL (ref 0–100)
LEUKOCYTE ESTERASE UR QL STRIP: NEGATIVE
MAGNESIUM SERPL-MCNC: 2.1 MG/DL (ref 1.6–2.4)
NITRITE UR QL STRIP: NEGATIVE
PH UR STRIP: 6 [PH] (ref 5–8)
PHOSPHATE SERPL-MCNC: 4.6 MG/DL (ref 2.5–4.5)
POTASSIUM SERPL-SCNC: 4.1 MMOL/L (ref 3.7–5.3)
PROT UR STRIP-MCNC: NEGATIVE MG/DL
RBC #/AREA URNS HPF: NORMAL /HPF (ref 0–4)
SODIUM SERPL-SCNC: 144 MMOL/L (ref 136–145)
SP GR UR STRIP: 1.01 (ref 1–1.03)
TRIGL SERPL-MCNC: 60 MG/DL (ref 0–149)
UROBILINOGEN UR STRIP-ACNC: NORMAL EU/DL (ref 0–1)
VLDLC SERPL CALC-MCNC: 12 MG/DL
WBC #/AREA URNS HPF: NORMAL /HPF (ref 0–5)

## 2024-08-14 ENCOUNTER — HOSPITAL ENCOUNTER (OUTPATIENT)
Age: 74
Setting detail: SPECIMEN
Discharge: HOME | End: 2024-08-14

## 2024-08-14 DIAGNOSIS — N18.30 BENIGN HYPERTENSION WITH CKD (CHRONIC KIDNEY DISEASE) STAGE III (HCC): ICD-10-CM

## 2024-08-14 DIAGNOSIS — I12.9 BENIGN HYPERTENSION WITH CKD (CHRONIC KIDNEY DISEASE) STAGE III (HCC): ICD-10-CM

## 2024-08-14 DIAGNOSIS — Z13.21 ENCOUNTER FOR VITAMIN DEFICIENCY SCREENING: ICD-10-CM

## 2024-08-14 DIAGNOSIS — E13.22 SECONDARY DIABETES MELLITUS WITH STAGE 3 CHRONIC KIDNEY DISEASE (HCC): ICD-10-CM

## 2024-08-14 DIAGNOSIS — N18.32 STAGE 3B CHRONIC KIDNEY DISEASE (HCC): ICD-10-CM

## 2024-08-14 DIAGNOSIS — E83.39 HYPERPHOSPHATEMIA: ICD-10-CM

## 2024-08-14 DIAGNOSIS — N18.30 SECONDARY DIABETES MELLITUS WITH STAGE 3 CHRONIC KIDNEY DISEASE (HCC): ICD-10-CM

## 2024-08-14 LAB
25(OH)D3 SERPL-MCNC: 62.9 NG/ML (ref 30–100)
ANION GAP SERPL CALCULATED.3IONS-SCNC: 12 MMOL/L (ref 9–16)
BACTERIA URNS QL MICRO: NORMAL
BILIRUB UR QL STRIP: NEGATIVE
BUN SERPL-MCNC: 46 MG/DL (ref 8–23)
CALCIUM SERPL-MCNC: 8.9 MG/DL (ref 8.6–10.4)
CASTS #/AREA URNS LPF: NORMAL /LPF (ref 0–8)
CHLORIDE SERPL-SCNC: 108 MMOL/L (ref 98–107)
CLARITY UR: CLEAR
CO2 SERPL-SCNC: 23 MMOL/L (ref 20–31)
COLOR UR: YELLOW
CREAT SERPL-MCNC: 1.4 MG/DL (ref 0.5–0.9)
EPI CELLS #/AREA URNS HPF: NORMAL /HPF (ref 0–5)
GFR, ESTIMATED: 39 ML/MIN/1.73M2
GLUCOSE UR STRIP-MCNC: NEGATIVE MG/DL
HGB UR QL STRIP.AUTO: NEGATIVE
KETONES UR STRIP-MCNC: NEGATIVE MG/DL
LEUKOCYTE ESTERASE UR QL STRIP: NEGATIVE
MAGNESIUM SERPL-MCNC: 2 MG/DL (ref 1.6–2.4)
NITRITE UR QL STRIP: NEGATIVE
PH UR STRIP: 6 [PH] (ref 5–8)
PHOSPHATE SERPL-MCNC: 3.3 MG/DL (ref 2.5–4.5)
POTASSIUM SERPL-SCNC: 4.6 MMOL/L (ref 3.7–5.3)
PROT UR STRIP-MCNC: NEGATIVE MG/DL
RBC #/AREA URNS HPF: NORMAL /HPF (ref 0–4)
SODIUM SERPL-SCNC: 143 MMOL/L (ref 136–145)
SP GR UR STRIP: 1.01 (ref 1–1.03)
UROBILINOGEN UR STRIP-ACNC: NORMAL EU/DL (ref 0–1)
WBC #/AREA URNS HPF: NORMAL /HPF (ref 0–5)

## 2024-08-15 LAB
CREAT UR-MCNC: 45.6 MG/DL (ref 28–217)
MICROALBUMIN UR-MCNC: 25 MG/L (ref 0–20)
MICROALBUMIN/CREAT UR-RTO: 55 MCG/MG CREAT (ref 0–25)

## 2025-02-21 ENCOUNTER — HOSPITAL ENCOUNTER (OUTPATIENT)
Age: 75
Setting detail: SPECIMEN
Discharge: HOME | End: 2025-02-21

## 2025-02-21 DIAGNOSIS — E13.22 SECONDARY DIABETES MELLITUS WITH STAGE 3 CHRONIC KIDNEY DISEASE (HCC): ICD-10-CM

## 2025-02-21 DIAGNOSIS — N18.32 STAGE 3B CHRONIC KIDNEY DISEASE (HCC): ICD-10-CM

## 2025-02-21 DIAGNOSIS — Z13.21 ENCOUNTER FOR VITAMIN DEFICIENCY SCREENING: ICD-10-CM

## 2025-02-21 DIAGNOSIS — I12.9 BENIGN HYPERTENSION WITH CKD (CHRONIC KIDNEY DISEASE) STAGE III (HCC): ICD-10-CM

## 2025-02-21 DIAGNOSIS — N18.30 SECONDARY DIABETES MELLITUS WITH STAGE 3 CHRONIC KIDNEY DISEASE (HCC): ICD-10-CM

## 2025-02-21 DIAGNOSIS — R80.9 MICROALBUMINURIA: ICD-10-CM

## 2025-02-21 DIAGNOSIS — N18.30 BENIGN HYPERTENSION WITH CKD (CHRONIC KIDNEY DISEASE) STAGE III (HCC): ICD-10-CM

## 2025-02-21 DIAGNOSIS — E56.9 VITAMIN DEFICIENCY: ICD-10-CM

## 2025-02-21 LAB
25(OH)D3 SERPL-MCNC: 81.2 NG/ML (ref 30–100)
ANION GAP SERPL CALCULATED.3IONS-SCNC: 13 MMOL/L (ref 9–16)
BACTERIA URNS QL MICRO: NORMAL
BILIRUB UR QL STRIP: NEGATIVE
BUN SERPL-MCNC: 45 MG/DL (ref 8–23)
CALCIUM SERPL-MCNC: 9.6 MG/DL (ref 8.6–10.4)
CASTS #/AREA URNS LPF: NORMAL /LPF (ref 0–8)
CHLORIDE SERPL-SCNC: 106 MMOL/L (ref 98–107)
CLARITY UR: CLEAR
CO2 SERPL-SCNC: 23 MMOL/L (ref 20–31)
COLOR UR: YELLOW
CREAT SERPL-MCNC: 1.4 MG/DL (ref 0.6–0.9)
CREAT UR-MCNC: 40.5 MG/DL (ref 28–217)
EPI CELLS #/AREA URNS HPF: NORMAL /HPF (ref 0–5)
GFR, ESTIMATED: 39 ML/MIN/1.73M2
GLUCOSE UR STRIP-MCNC: NEGATIVE MG/DL
HCT VFR BLD AUTO: 41.9 % (ref 36.3–47.1)
HGB BLD-MCNC: 13.8 G/DL (ref 11.9–15.1)
HGB UR QL STRIP.AUTO: NEGATIVE
KETONES UR STRIP-MCNC: NEGATIVE MG/DL
LEUKOCYTE ESTERASE UR QL STRIP: NEGATIVE
MAGNESIUM SERPL-MCNC: 1.8 MG/DL (ref 1.6–2.4)
MICROALBUMIN UR-MCNC: 40 MG/L (ref 0–20)
MICROALBUMIN/CREAT UR-RTO: 99 MCG/MG CREAT (ref 0–25)
NITRITE UR QL STRIP: NEGATIVE
PH UR STRIP: 6 [PH] (ref 5–8)
PHOSPHATE SERPL-MCNC: 4.2 MG/DL (ref 2.5–4.5)
POTASSIUM SERPL-SCNC: 4.5 MMOL/L (ref 3.7–5.3)
PROT UR STRIP-MCNC: NEGATIVE MG/DL
RBC #/AREA URNS HPF: NORMAL /HPF (ref 0–4)
SODIUM SERPL-SCNC: 142 MMOL/L (ref 136–145)
SP GR UR STRIP: 1.01 (ref 1–1.03)
UROBILINOGEN UR STRIP-ACNC: NORMAL EU/DL (ref 0–1)
WBC #/AREA URNS HPF: NORMAL /HPF (ref 0–5)

## 2025-06-03 ENCOUNTER — HOSPITAL ENCOUNTER (OUTPATIENT)
Age: 75
Setting detail: SPECIMEN
Discharge: HOME | End: 2025-06-03

## 2025-06-03 LAB
C PEPTIDE SERPL-MCNC: 1.5 NG/ML (ref 1.1–4.4)
CHOLEST SERPL-MCNC: 177 MG/DL (ref 0–199)
CHOLESTEROL/HDL RATIO: 2.5
HDLC SERPL-MCNC: 70 MG/DL
LDLC SERPL CALC-MCNC: 96 MG/DL (ref 0–100)
TRIGL SERPL-MCNC: 55 MG/DL (ref 0–149)
VLDLC SERPL CALC-MCNC: 11 MG/DL (ref 1–30)

## 2025-07-22 ENCOUNTER — HOSPITAL ENCOUNTER (OUTPATIENT)
Age: 75
Setting detail: SPECIMEN
Discharge: HOME | End: 2025-07-22

## 2025-07-22 DIAGNOSIS — E13.22 SECONDARY DIABETES MELLITUS WITH STAGE 3 CHRONIC KIDNEY DISEASE (HCC): ICD-10-CM

## 2025-07-22 DIAGNOSIS — N18.32 STAGE 3B CHRONIC KIDNEY DISEASE (HCC): ICD-10-CM

## 2025-07-22 DIAGNOSIS — N18.30 SECONDARY DIABETES MELLITUS WITH STAGE 3 CHRONIC KIDNEY DISEASE (HCC): ICD-10-CM

## 2025-07-22 DIAGNOSIS — E56.9 VITAMIN DEFICIENCY: ICD-10-CM

## 2025-07-22 DIAGNOSIS — I12.9 BENIGN HYPERTENSION WITH CKD (CHRONIC KIDNEY DISEASE) STAGE III (HCC): ICD-10-CM

## 2025-07-22 DIAGNOSIS — N18.30 BENIGN HYPERTENSION WITH CKD (CHRONIC KIDNEY DISEASE) STAGE III (HCC): ICD-10-CM

## 2025-07-22 DIAGNOSIS — Z13.21 ENCOUNTER FOR VITAMIN DEFICIENCY SCREENING: ICD-10-CM

## 2025-07-22 LAB
25(OH)D3 SERPL-MCNC: 61.2 NG/ML (ref 30–100)
ANION GAP SERPL CALCULATED.3IONS-SCNC: 14 MMOL/L (ref 9–16)
BACTERIA URNS QL MICRO: NORMAL
BILIRUB UR QL STRIP: NEGATIVE
BUN SERPL-MCNC: 42 MG/DL (ref 8–23)
CALCIUM SERPL-MCNC: 9.3 MG/DL (ref 8.6–10.4)
CASTS #/AREA URNS LPF: NORMAL /LPF (ref 0–8)
CHLORIDE SERPL-SCNC: 106 MMOL/L (ref 98–107)
CLARITY UR: CLEAR
CO2 SERPL-SCNC: 20 MMOL/L (ref 20–31)
COLOR UR: YELLOW
CREAT SERPL-MCNC: 1.4 MG/DL (ref 0.6–0.9)
CREAT UR-MCNC: 27.8 MG/DL (ref 28–217)
EPI CELLS #/AREA URNS HPF: NORMAL /HPF (ref 0–5)
GFR, ESTIMATED: 39 ML/MIN/1.73M2
GLUCOSE UR STRIP-MCNC: NEGATIVE MG/DL
HCT VFR BLD AUTO: 38.9 % (ref 36.3–47.1)
HGB BLD-MCNC: 12.7 G/DL (ref 11.9–15.1)
HGB UR QL STRIP.AUTO: NEGATIVE
KETONES UR STRIP-MCNC: NEGATIVE MG/DL
LEUKOCYTE ESTERASE UR QL STRIP: NEGATIVE
MAGNESIUM SERPL-MCNC: 2 MG/DL (ref 1.6–2.4)
MICROALBUMIN UR-MCNC: 13 MG/L (ref 0–20)
MICROALBUMIN/CREAT UR-RTO: 47 MCG/MG CREAT (ref 0–25)
NITRITE UR QL STRIP: NEGATIVE
PH UR STRIP: 5.5 [PH] (ref 5–8)
PHOSPHATE SERPL-MCNC: 4.1 MG/DL (ref 2.5–4.5)
POTASSIUM SERPL-SCNC: 4.4 MMOL/L (ref 3.7–5.3)
PROT UR STRIP-MCNC: NEGATIVE MG/DL
RBC #/AREA URNS HPF: NORMAL /HPF (ref 0–4)
SODIUM SERPL-SCNC: 140 MMOL/L (ref 136–145)
SP GR UR STRIP: 1.01 (ref 1–1.03)
UROBILINOGEN UR STRIP-ACNC: NORMAL EU/DL (ref 0–1)
WBC #/AREA URNS HPF: NORMAL /HPF (ref 0–5)

## (undated) DEVICE — FORCEPS BX L240CM WRK CHN 2.8MM STD CAP W/ NDL MIC MESH

## (undated) DEVICE — GOWN,AURORA,NONREINFORCED,LARGE: Brand: MEDLINE

## (undated) DEVICE — MEDICINE CUP, GRADUATED, STER: Brand: MEDLINE

## (undated) DEVICE — Device: Brand: DEFENDO VALVE AND CONNECTOR KIT

## (undated) DEVICE — CO2 CANNULA,SUPERSOFT, ADLT,7'O2,7'CO2: Brand: MEDLINE

## (undated) DEVICE — GLOVE SURG 8 11.7IN BEAD CUF LIGHT BRN SENSICARE LTX FREE

## (undated) DEVICE — JELLY,LUBE,STERILE,FLIP TOP,TUBE,2-OZ: Brand: MEDLINE

## (undated) DEVICE — GAUZE,SPONGE,4"X4",16PLY,STRL,LF,10/TRAY: Brand: MEDLINE

## (undated) DEVICE — YANKAUER,FLEXIBLE HANDLE,REGLR CAPACITY: Brand: MEDLINE INDUSTRIES, INC.

## (undated) DEVICE — BASIN EMSIS 700ML GRAPHITE PLAS KID SHP GRAD

## (undated) DEVICE — SYRINGE MED 50ML LUERLOCK TIP

## (undated) DEVICE — BLOCK BITE 60FR RUBBER ADLT DENTAL

## (undated) DEVICE — TUBING, SUCTION, 1/4" X 12', STRAIGHT: Brand: MEDLINE

## (undated) DEVICE — CONMED DISPOSABLE GASTROINTESTINAL CYTOLOGY BRUSH, STRAIGHT HANDLE, 2.5 MM X 160 CM: Brand: CONMED

## (undated) DEVICE — SINGLE-USE POLYPECTOMY SNARE: Brand: CAPTIFLEX

## (undated) DEVICE — CUP MED 1OZ CLR POLYPR FEED GRAD W/O LID

## (undated) DEVICE — ADAPTER TBNG LUER STUB 15 GA INTMED